# Patient Record
Sex: FEMALE | Race: WHITE | NOT HISPANIC OR LATINO | Employment: OTHER | RURAL
[De-identification: names, ages, dates, MRNs, and addresses within clinical notes are randomized per-mention and may not be internally consistent; named-entity substitution may affect disease eponyms.]

---

## 2017-01-06 ENCOUNTER — OFFICE VISIT (OUTPATIENT)
Dept: RETAIL CLINIC | Facility: CLINIC | Age: 52
End: 2017-01-06

## 2017-01-06 VITALS
OXYGEN SATURATION: 96 % | TEMPERATURE: 98.3 F | BODY MASS INDEX: 48.47 KG/M2 | WEIGHT: 265 LBS | HEART RATE: 86 BPM | RESPIRATION RATE: 20 BRPM

## 2017-01-06 DIAGNOSIS — R05.9 COUGH: Primary | ICD-10-CM

## 2017-01-06 PROCEDURE — 99213 OFFICE O/P EST LOW 20 MIN: CPT | Performed by: NURSE PRACTITIONER

## 2017-01-06 RX ORDER — PRAMIPEXOLE DIHYDROCHLORIDE 1.5 MG/1
1.5 TABLET ORAL 3 TIMES DAILY
Status: ON HOLD | COMMUNITY
End: 2017-07-17

## 2017-01-06 RX ORDER — OMEPRAZOLE 20 MG/1
20 CAPSULE, DELAYED RELEASE ORAL DAILY PRN
COMMUNITY
End: 2019-12-18 | Stop reason: ALTCHOICE

## 2017-01-06 RX ORDER — BUDESONIDE 0.5 MG/2ML
0.5 INHALANT ORAL
Qty: 30 ML | Refills: 0 | Status: ON HOLD | OUTPATIENT
Start: 2017-01-06 | End: 2017-07-17

## 2017-01-06 RX ORDER — LOSARTAN POTASSIUM 25 MG/1
25 TABLET ORAL DAILY
Status: ON HOLD | COMMUNITY
End: 2017-07-17

## 2017-01-06 RX ORDER — HYDROCHLOROTHIAZIDE 25 MG/1
25 TABLET ORAL DAILY
COMMUNITY
End: 2018-01-05 | Stop reason: SDUPTHER

## 2017-01-06 RX ORDER — PRAVASTATIN SODIUM 20 MG
20 TABLET ORAL DAILY
Status: ON HOLD | COMMUNITY
End: 2017-07-17

## 2017-01-06 RX ORDER — PREDNISONE 10 MG/1
TABLET ORAL
Qty: 21 TABLET | Refills: 0 | Status: ON HOLD | OUTPATIENT
Start: 2017-01-06 | End: 2017-07-17

## 2017-01-06 RX ORDER — PROPRANOLOL HYDROCHLORIDE 20 MG/1
20 TABLET ORAL 2 TIMES DAILY
COMMUNITY
End: 2017-07-20 | Stop reason: HOSPADM

## 2017-01-06 RX ORDER — FUROSEMIDE 20 MG/1
20 TABLET ORAL DAILY PRN
Status: ON HOLD | COMMUNITY
End: 2017-07-17

## 2017-01-06 RX ORDER — ASPIRIN 81 MG/1
81 TABLET ORAL DAILY
COMMUNITY
End: 2017-07-20 | Stop reason: HOSPADM

## 2017-01-06 RX ORDER — DEXTROMETHORPHAN HYDROBROMIDE AND PROMETHAZINE HYDROCHLORIDE 15; 6.25 MG/5ML; MG/5ML
5 SYRUP ORAL NIGHTLY PRN
Qty: 50 ML | Refills: 0 | Status: SHIPPED | OUTPATIENT
Start: 2017-01-06 | End: 2017-01-16

## 2017-01-06 NOTE — MR AVS SNAPSHOT
Ondina Hayes   1/6/2017 10:00 AM   Office Visit    Dept Phone:  [phone]   Encounter #:  [id number]    Provider:  KRISTOPHER DUBON   Department:  [facility]                Your Full Care Plan              Today's Medication Changes          These changes are accurate as of: 1/6/17 10:43 AM.  If you have any questions, ask your nurse or doctor.               New Medication(s)Ordered:     budesonide 0.5 MG/2ML nebulizer solution   Commonly known as:  PULMICORT   Take 2 mL by nebulization Daily.       predniSONE 10 MG tablet   Commonly known as:  DELTASONE   Take as directed per 6 day pred jolene       promethazine-dextromethorphan 6.25-15 MG/5ML syrup   Commonly known as:  PROMETHAZINE-DM   Take 5 mL by mouth At Night As Needed for cough for up to 10 days.            Where to Get Your Medications      These medications were sent to [facility] [place], [place] - [address] [phone]  - [phone] FX  [address] [facility] [place] [address]     Phone:  [phone]     budesonide 0.5 MG/2ML nebulizer solution    predniSONE 10 MG tablet    promethazine-dextromethorphan 6.25-15 MG/5ML syrup                  Your Updated Medication List          This list is accurate as of: 1/6/17 10:43 AM.  Always use your most recent med list.                aspirin 81 MG EC tablet       budesonide 0.5 MG/2ML nebulizer solution   Commonly known as:  PULMICORT   Take 2 mL by nebulization Daily.       COZAAR 25 MG tablet   Generic drug:  losartan       furosemide 20 MG tablet   Commonly known as:  LASIX       hydrochlorothiazide 25 MG tablet   Commonly known as:  HYDRODIURIL       MULTIVITAMIN ADULT PO       omeprazole 20 MG capsule   Commonly known as:  priLOSEC       pramipexole 1.5 MG tablet   Commonly known as:  MIRAPEX       pravastatin 20 MG tablet   Commonly known as:  PRAVACHOL       predniSONE 10 MG tablet   Commonly known as:   DELTASONE   Take as directed per 6 day pred jolene       promethazine-dextromethorphan 6.25-15 MG/5ML syrup   Commonly known as:  PROMETHAZINE-DM   Take 5 mL by mouth At Night As Needed for cough for up to 10 days.       propranolol 20 MG tablet   Commonly known as:  INDERAL       VITAMIN D (CHOLECALCIFEROL) PO               You Were Diagnosed With        Codes Comments    Cough    -  Primary ICD-10-CM: R05  ICD-9-CM: 786.2       Instructions    Cough, Adult  Coughing is a reflex that clears your throat and your airways. Coughing helps to heal and protect your lungs. It is normal to cough occasionally, but a cough that happens with other symptoms or lasts a long time may be a sign of a condition that needs treatment. A cough may last only 2-3 weeks (acute), or it may last longer than 8 weeks (chronic).  CAUSES  Coughing is commonly caused by:  · Breathing in substances that irritate your lungs.  · A viral or bacterial respiratory infection.  · Allergies.  · Asthma.  · Postnasal drip.  · Smoking.  · Acid backing up from the stomach into the esophagus (gastroesophageal reflux).  · Certain medicines.  · Chronic lung problems, including COPD (or rarely, lung cancer).  · Other medical conditions such as heart failure.  HOME CARE INSTRUCTIONS   Pay attention to any changes in your symptoms. Take these actions to help with your discomfort:  · Take medicines only as told by your health care provider.    If you were prescribed an antibiotic medicine, take it as told by your health care provider. Do not stop taking the antibiotic even if you start to feel better.    Talk with your health care provider before you take a cough suppressant medicine.  · Drink enough fluid to keep your urine clear or pale yellow.  · If the air is dry, use a cold steam vaporizer or humidifier in your bedroom or your home to help loosen secretions.  · Avoid anything that causes you to cough at work or at home.  · If your cough is worse at night, try  sleeping in a semi-upright position.  · Avoid cigarette smoke. If you smoke, quit smoking. If you need help quitting, ask your health care provider.  · Avoid caffeine.  · Avoid alcohol.  · Rest as needed.  SEEK MEDICAL CARE IF:   · You have new symptoms.  · You cough up pus.  · Your cough does not get better after 2-3 weeks, or your cough gets worse.  · You cannot control your cough with suppressant medicines and you are losing sleep.  · You develop pain that is getting worse or pain that is not controlled with pain medicines.  · You have a fever.  · You have unexplained weight loss.  · You have night sweats.  SEEK IMMEDIATE MEDICAL CARE IF:  · You cough up blood.  · You have difficulty breathing.  · Your heartbeat is very fast.     This information is not intended to replace advice given to you by your health care provider. Make sure you discuss any questions you have with your health care provider.     Document Released: 06/15/2012 Document Revised: 2016 Document Reviewed: 2016  [employer] Interactive Patient Education © [employer]       Patient Instructions History      Upcoming Appointments     Visit Type Date Time Department    OFFICE VISIT 2017 10:00 AM [facility]      Agent Partner Signup     [employer] allows you to send messages to your doctor, view your test results, renew your prescriptions, schedule appointments, and more. To sign up, go to [employer] and click on the Sign Up Now link in the New User? box. Enter your Agent Partner Activation Code exactly as it appears below along with the last four digits of your Social Security Number and your Date of Birth () to complete the sign-up process. If you do not sign up before the expiration date, you must request a new code.    Agent Partner Activation Code: [email or web address]  Expires: 2017 10:43 AM    If you have questions, you can email [email or web address] or call [phone] to talk to our Agent Partner  staff. Remember, MyChart is NOT to be used for urgent needs. For medical emergencies, dial 911.               Other Info from Your Visit           Allergies     Lorcet [Hydrocodone-acetaminophen]  Other (See Comments)    Rash that bleeds      Reason for Visit     URI           Vital Signs     Pulse Temperature Respirations Weight Oxygen Saturation       86 98.3 °F (36.8 °C) 20 265 lb (120 kg) 96%     Body Mass Index Smoking Status                48.47 kg/m2 Never Smoker          Problems and Diagnoses Noted     Cough    -  Primary

## 2017-01-06 NOTE — LETTER
January 6, 2017     Jazzmine Zapata PA-C  [address]  [address]  [place] [place] [address]    Patient: Ondina Hayes   YOB: 1965   Date of Visit: 1/6/2017       Dear Dr. Benny PA-C:    Thank you for referring Ondina Hayes to me for evaluation. Below are the relevant portions of my assessment and plan of care.    If you have questions, please do not hesitate to call me. I look forward to following Ondina along with you.         Sincerely,        PROVIDER [facility]        CC: No Recipients  Rayshawn Snell, APRN  1/6/2017 10:54 AM  Sign at close encounter  Subjective   Ondina Hayes is a 51 y.o. female.   Chief Complaint   Patient presents with   • Cough   • Wheezing      Cough   This is a recurrent problem. The current episode started more than 1 month ago. The problem has been waxing and waning. The problem occurs constantly. The cough is non-productive. Associated symptoms include wheezing. Pertinent negatives include no fever. She has tried a beta-agonist inhaler and prescription cough suppressant (zpak in october, clindamycin and doxycycline in november) for the symptoms. The treatment provided mild relief.   Wheezing    Associated symptoms include coughing. Pertinent negatives include no fever.        The following portions of the patient's history were reviewed and updated as appropriate: allergies, current medications, past family history, past medical history, past social history, past surgical history and problem list.    Review of Systems   Constitutional: Negative.  Negative for fever.   HENT: Negative.    Eyes: Negative.    Respiratory: Positive for cough and wheezing.    Gastrointestinal: Negative.    Genitourinary: Negative.    Skin: Negative.    Allergic/Immunologic: Negative.    Psychiatric/Behavioral: Negative.    All other systems reviewed and are negative.      Objective   Allergies   Allergen Reactions   • Lorcet [Hydrocodone-Acetaminophen] Other (See Comments)    Rash that bleeds         Physical Exam   Constitutional: She is oriented to person, place, and time. She appears well-developed and well-nourished.   HENT:   Right Ear: Tympanic membrane normal.   Left Ear: Tympanic membrane normal.   Nose: Nose normal.   Mouth/Throat: Oropharynx is clear and moist.   Eyes: Pupils are equal, round, and reactive to light.   Neck: Neck supple.   Cardiovascular: Normal rate and regular rhythm.    Pulmonary/Chest: Effort normal. She has decreased breath sounds in the right lower field and the left lower field. She has wheezes.   Persistent cough   Abdominal: Soft. Bowel sounds are normal.   Musculoskeletal: Normal range of motion.   Neurological: She is alert and oriented to person, place, and time.   Skin: Skin is warm and dry.   Psychiatric: She has a normal mood and affect.   Vitals reviewed.      Assessment/Plan   Ondina was seen today for cough and wheezing.    Diagnoses and all orders for this visit:    Cough  -     XR Chest PA & Lateral; Future    Other orders  -     predniSONE (DELTASONE) 10 MG tablet; Take as directed per 6 day pred jolene  -     promethazine-dextromethorphan (PROMETHAZINE-DM) 6.25-15 MG/5ML syrup; Take 5 mL by mouth At Night As Needed for cough for up to 10 days.  -     budesonide (PULMICORT) 0.5 MG/2ML nebulizer solution; Take 2 mL by nebulization Daily.  -     Respiratory Therapy Supplies (NEBULIZER/ADULT MASK) kit; 1 kit 3 (Three) Times a Day As Needed (for cough and wheeze). Indications: please forward to home supply store                 This document has been electronically signed by NHAN Downey January 6, 2017 10:51 AM

## 2017-01-06 NOTE — PROGRESS NOTES
Subjective   Ondina Hayes is a 51 y.o. female.   Chief Complaint   Patient presents with   • Cough   • Wheezing      Cough   This is a recurrent problem. The current episode started more than 1 month ago. The problem has been waxing and waning. The problem occurs constantly. The cough is non-productive. Associated symptoms include wheezing. Pertinent negatives include no fever. She has tried a beta-agonist inhaler and prescription cough suppressant (zpak in october, clindamycin and doxycycline in november) for the symptoms. The treatment provided mild relief.   Wheezing    Associated symptoms include coughing. Pertinent negatives include no fever.        The following portions of the patient's history were reviewed and updated as appropriate: allergies, current medications, past family history, past medical history, past social history, past surgical history and problem list.    Review of Systems   Constitutional: Negative.  Negative for fever.   HENT: Negative.    Eyes: Negative.    Respiratory: Positive for cough and wheezing.    Gastrointestinal: Negative.    Genitourinary: Negative.    Skin: Negative.    Allergic/Immunologic: Negative.    Psychiatric/Behavioral: Negative.    All other systems reviewed and are negative.      Objective   Allergies   Allergen Reactions   • Lorcet [Hydrocodone-Acetaminophen] Other (See Comments)     Rash that bleeds         Physical Exam   Constitutional: She is oriented to person, place, and time. She appears well-developed and well-nourished.   HENT:   Right Ear: Tympanic membrane normal.   Left Ear: Tympanic membrane normal.   Nose: Nose normal.   Mouth/Throat: Oropharynx is clear and moist.   Eyes: Pupils are equal, round, and reactive to light.   Neck: Neck supple.   Cardiovascular: Normal rate and regular rhythm.    Pulmonary/Chest: Effort normal. She has decreased breath sounds in the right lower field and the left lower field. She has wheezes.   Persistent cough    Abdominal: Soft. Bowel sounds are normal.   Musculoskeletal: Normal range of motion.   Neurological: She is alert and oriented to person, place, and time.   Skin: Skin is warm and dry.   Psychiatric: She has a normal mood and affect.   Vitals reviewed.      Assessment/Plan   Ondina was seen today for cough and wheezing.    Diagnoses and all orders for this visit:    Cough  -     XR Chest PA & Lateral; Future    Other orders  -     predniSONE (DELTASONE) 10 MG tablet; Take as directed per 6 day pred jolene  -     promethazine-dextromethorphan (PROMETHAZINE-DM) 6.25-15 MG/5ML syrup; Take 5 mL by mouth At Night As Needed for cough for up to 10 days.  -     budesonide (PULMICORT) 0.5 MG/2ML nebulizer solution; Take 2 mL by nebulization Daily.  -     Respiratory Therapy Supplies (NEBULIZER/ADULT MASK) kit; 1 kit 3 (Three) Times a Day As Needed (for cough and wheeze). Indications: please forward to home supply store                 This document has been electronically signed by NHAN Downey January 6, 2017 10:51 AM

## 2017-01-06 NOTE — PATIENT INSTRUCTIONS
Cough, Adult  Coughing is a reflex that clears your throat and your airways. Coughing helps to heal and protect your lungs. It is normal to cough occasionally, but a cough that happens with other symptoms or lasts a long time may be a sign of a condition that needs treatment. A cough may last only 2-3 weeks (acute), or it may last longer than 8 weeks (chronic).  CAUSES  Coughing is commonly caused by:  · Breathing in substances that irritate your lungs.  · A viral or bacterial respiratory infection.  · Allergies.  · Asthma.  · Postnasal drip.  · Smoking.  · Acid backing up from the stomach into the esophagus (gastroesophageal reflux).  · Certain medicines.  · Chronic lung problems, including COPD (or rarely, lung cancer).  · Other medical conditions such as heart failure.  HOME CARE INSTRUCTIONS   Pay attention to any changes in your symptoms. Take these actions to help with your discomfort:  · Take medicines only as told by your health care provider.    If you were prescribed an antibiotic medicine, take it as told by your health care provider. Do not stop taking the antibiotic even if you start to feel better.    Talk with your health care provider before you take a cough suppressant medicine.  · Drink enough fluid to keep your urine clear or pale yellow.  · If the air is dry, use a cold steam vaporizer or humidifier in your bedroom or your home to help loosen secretions.  · Avoid anything that causes you to cough at work or at home.  · If your cough is worse at night, try sleeping in a semi-upright position.  · Avoid cigarette smoke. If you smoke, quit smoking. If you need help quitting, ask your health care provider.  · Avoid caffeine.  · Avoid alcohol.  · Rest as needed.  SEEK MEDICAL CARE IF:   · You have new symptoms.  · You cough up pus.  · Your cough does not get better after 2-3 weeks, or your cough gets worse.  · You cannot control your cough with suppressant medicines and you are losing sleep.  · You  develop pain that is getting worse or pain that is not controlled with pain medicines.  · You have a fever.  · You have unexplained weight loss.  · You have night sweats.  SEEK IMMEDIATE MEDICAL CARE IF:  · You cough up blood.  · You have difficulty breathing.  · Your heartbeat is very fast.     This information is not intended to replace advice given to you by your health care provider. Make sure you discuss any questions you have with your health care provider.     Document Released: 06/15/2012 Document Revised: 09/07/2016 Document Reviewed: 02/24/2016  [employer] Interactive Patient Education ©2016 [employer]

## 2017-02-03 ENCOUNTER — APPOINTMENT (OUTPATIENT)
Dept: CT IMAGING | Facility: HOSPITAL | Age: 52
End: 2017-02-03

## 2017-02-03 ENCOUNTER — HOSPITAL ENCOUNTER (EMERGENCY)
Facility: HOSPITAL | Age: 52
Discharge: HOME | End: 2017-02-03
Attending: EMERGENCY MEDICINE | Admitting: EMERGENCY MEDICINE

## 2017-02-03 ENCOUNTER — APPOINTMENT (OUTPATIENT)
Dept: GENERAL RADIOLOGY | Facility: HOSPITAL | Age: 52
End: 2017-02-03

## 2017-02-03 VITALS
OXYGEN SATURATION: 99 % | TEMPERATURE: 98.2 F | BODY MASS INDEX: 47.84 KG/M2 | SYSTOLIC BLOOD PRESSURE: 129 MMHG | WEIGHT: 260 LBS | RESPIRATION RATE: 18 BRPM | HEIGHT: 62 IN | HEART RATE: 76 BPM | DIASTOLIC BLOOD PRESSURE: 74 MMHG

## 2017-02-03 DIAGNOSIS — I10 ESSENTIAL HYPERTENSION: ICD-10-CM

## 2017-02-03 DIAGNOSIS — G44.209 ACUTE NON INTRACTABLE TENSION-TYPE HEADACHE: Primary | ICD-10-CM

## 2017-02-03 LAB
ALBUMIN SERPL-MCNC: 4.5 G/DL (ref 3.5–5)
ALBUMIN/GLOB SERPL: 1.5 G/DL (ref 1.5–2.5)
ALP SERPL-CCNC: 71 U/L (ref 46–116)
ALT SERPL W P-5'-P-CCNC: 26 U/L (ref 10–36)
ANION GAP SERPL CALCULATED.3IONS-SCNC: 5.8 MMOL/L (ref 3.6–11.2)
AST SERPL-CCNC: 32 U/L (ref 10–30)
BACTERIA UR QL AUTO: ABNORMAL /HPF
BASOPHILS # BLD AUTO: 0.05 10*3/MM3 (ref 0–0.3)
BASOPHILS NFR BLD AUTO: 1.1 % (ref 0–2)
BILIRUB SERPL-MCNC: 0.3 MG/DL (ref 0.2–1.8)
BILIRUB UR QL STRIP: NEGATIVE
BUN BLD-MCNC: 14 MG/DL (ref 7–21)
BUN/CREAT SERPL: 18.9 (ref 7–25)
CALCIUM SPEC-SCNC: 10.1 MG/DL (ref 7.7–10)
CHLORIDE SERPL-SCNC: 105 MMOL/L (ref 99–112)
CLARITY UR: CLEAR
CO2 SERPL-SCNC: 31.2 MMOL/L (ref 24.3–31.9)
COLOR UR: YELLOW
CREAT BLD-MCNC: 0.74 MG/DL (ref 0.43–1.29)
DEPRECATED RDW RBC AUTO: 41.1 FL (ref 37–54)
EOSINOPHIL # BLD AUTO: 0.19 10*3/MM3 (ref 0–0.7)
EOSINOPHIL NFR BLD AUTO: 4.1 % (ref 0–5)
ERYTHROCYTE [DISTWIDTH] IN BLOOD BY AUTOMATED COUNT: 12.9 % (ref 11.5–14.5)
GFR SERPL CREATININE-BSD FRML MDRD: 83 ML/MIN/1.73
GLOBULIN UR ELPH-MCNC: 3.1 GM/DL
GLUCOSE BLD-MCNC: 91 MG/DL (ref 70–110)
GLUCOSE UR STRIP-MCNC: NEGATIVE MG/DL
HCT VFR BLD AUTO: 38.7 % (ref 37–47)
HGB BLD-MCNC: 13 G/DL (ref 12–16)
HGB UR QL STRIP.AUTO: NEGATIVE
HYALINE CASTS UR QL AUTO: ABNORMAL /LPF
IMM GRANULOCYTES # BLD: 0.02 10*3/MM3 (ref 0–0.03)
IMM GRANULOCYTES NFR BLD: 0.4 % (ref 0–0.5)
KETONES UR QL STRIP: NEGATIVE
LEUKOCYTE ESTERASE UR QL STRIP.AUTO: ABNORMAL
LYMPHOCYTES # BLD AUTO: 1.43 10*3/MM3 (ref 1–3)
LYMPHOCYTES NFR BLD AUTO: 30.8 % (ref 21–51)
MCH RBC QN AUTO: 29.2 PG (ref 27–33)
MCHC RBC AUTO-ENTMCNC: 33.6 G/DL (ref 33–37)
MCV RBC AUTO: 87 FL (ref 80–94)
MONOCYTES # BLD AUTO: 0.41 10*3/MM3 (ref 0.1–0.9)
MONOCYTES NFR BLD AUTO: 8.8 % (ref 0–10)
NEUTROPHILS # BLD AUTO: 2.55 10*3/MM3 (ref 1.4–6.5)
NEUTROPHILS NFR BLD AUTO: 54.8 % (ref 30–70)
NITRITE UR QL STRIP: NEGATIVE
OSMOLALITY SERPL CALC.SUM OF ELEC: 283.2 MOSM/KG (ref 273–305)
PH UR STRIP.AUTO: 5.5 [PH] (ref 5–8)
PLATELET # BLD AUTO: 317 10*3/MM3 (ref 130–400)
PMV BLD AUTO: 10 FL (ref 6–10)
POTASSIUM BLD-SCNC: 3.9 MMOL/L (ref 3.5–5.3)
PROT SERPL-MCNC: 7.6 G/DL (ref 6–8)
PROT UR QL STRIP: NEGATIVE
RBC # BLD AUTO: 4.45 10*6/MM3 (ref 4.2–5.4)
RBC # UR: ABNORMAL /HPF
REF LAB TEST METHOD: ABNORMAL
SODIUM BLD-SCNC: 142 MMOL/L (ref 135–153)
SP GR UR STRIP: 1.02 (ref 1–1.03)
SQUAMOUS #/AREA URNS HPF: ABNORMAL /HPF
TROPONIN I SERPL-MCNC: <0.006 NG/ML
UROBILINOGEN UR QL STRIP: ABNORMAL
WBC NRBC COR # BLD: 4.65 10*3/MM3 (ref 4.5–12.5)
WBC UR QL AUTO: ABNORMAL /HPF

## 2017-02-03 PROCEDURE — 85025 COMPLETE CBC W/AUTO DIFF WBC: CPT | Performed by: EMERGENCY MEDICINE

## 2017-02-03 PROCEDURE — 93005 ELECTROCARDIOGRAM TRACING: CPT | Performed by: EMERGENCY MEDICINE

## 2017-02-03 PROCEDURE — 93010 ELECTROCARDIOGRAM REPORT: CPT | Performed by: INTERNAL MEDICINE

## 2017-02-03 PROCEDURE — 70450 CT HEAD/BRAIN W/O DYE: CPT | Performed by: RADIOLOGY

## 2017-02-03 PROCEDURE — 81001 URINALYSIS AUTO W/SCOPE: CPT | Performed by: EMERGENCY MEDICINE

## 2017-02-03 PROCEDURE — 87086 URINE CULTURE/COLONY COUNT: CPT | Performed by: EMERGENCY MEDICINE

## 2017-02-03 PROCEDURE — 25010000002 KETOROLAC TROMETHAMINE PER 15 MG: Performed by: EMERGENCY MEDICINE

## 2017-02-03 PROCEDURE — 70450 CT HEAD/BRAIN W/O DYE: CPT

## 2017-02-03 PROCEDURE — 80053 COMPREHEN METABOLIC PANEL: CPT | Performed by: EMERGENCY MEDICINE

## 2017-02-03 PROCEDURE — 36415 COLL VENOUS BLD VENIPUNCTURE: CPT

## 2017-02-03 PROCEDURE — 94640 AIRWAY INHALATION TREATMENT: CPT

## 2017-02-03 PROCEDURE — 99284 EMERGENCY DEPT VISIT MOD MDM: CPT

## 2017-02-03 PROCEDURE — 84484 ASSAY OF TROPONIN QUANT: CPT | Performed by: EMERGENCY MEDICINE

## 2017-02-03 PROCEDURE — 96361 HYDRATE IV INFUSION ADD-ON: CPT

## 2017-02-03 PROCEDURE — 71010 HC CHEST PA OR AP: CPT

## 2017-02-03 PROCEDURE — 96374 THER/PROPH/DIAG INJ IV PUSH: CPT

## 2017-02-03 PROCEDURE — 71010 XR CHEST 1 VW: CPT | Performed by: RADIOLOGY

## 2017-02-03 RX ORDER — KETOROLAC TROMETHAMINE 30 MG/ML
30 INJECTION, SOLUTION INTRAMUSCULAR; INTRAVENOUS ONCE
Status: COMPLETED | OUTPATIENT
Start: 2017-02-03 | End: 2017-02-03

## 2017-02-03 RX ORDER — AMITRIPTYLINE HYDROCHLORIDE 50 MG/1
50 TABLET, FILM COATED ORAL NIGHTLY
Status: ON HOLD | COMMUNITY
End: 2017-07-17

## 2017-02-03 RX ORDER — MONTELUKAST SODIUM 10 MG/1
10 TABLET ORAL EVERY MORNING
COMMUNITY

## 2017-02-03 RX ORDER — SODIUM CHLORIDE 0.9 % (FLUSH) 0.9 %
10 SYRINGE (ML) INJECTION AS NEEDED
Status: DISCONTINUED | OUTPATIENT
Start: 2017-02-03 | End: 2017-02-03 | Stop reason: HOSPADM

## 2017-02-03 RX ORDER — BUTALBITAL, ACETAMINOPHEN AND CAFFEINE 50; 325; 40 MG/1; MG/1; MG/1
1-2 TABLET ORAL EVERY 6 HOURS PRN
Qty: 20 TABLET | Refills: 0 | Status: ON HOLD | OUTPATIENT
Start: 2017-02-03 | End: 2017-07-17

## 2017-02-03 RX ORDER — IPRATROPIUM BROMIDE AND ALBUTEROL SULFATE 2.5; .5 MG/3ML; MG/3ML
3 SOLUTION RESPIRATORY (INHALATION) ONCE
Status: COMPLETED | OUTPATIENT
Start: 2017-02-03 | End: 2017-02-03

## 2017-02-03 RX ADMIN — KETOROLAC TROMETHAMINE 30 MG: 30 INJECTION, SOLUTION INTRAMUSCULAR; INTRAVENOUS at 09:27

## 2017-02-03 RX ADMIN — SODIUM CHLORIDE 1000 ML: 9 INJECTION, SOLUTION INTRAVENOUS at 09:29

## 2017-02-03 RX ADMIN — IPRATROPIUM BROMIDE AND ALBUTEROL SULFATE 3 ML: .5; 3 SOLUTION RESPIRATORY (INHALATION) at 10:33

## 2017-02-03 NOTE — ED PROVIDER NOTES
Subjective   History of Present Illness  51-year-old white female complains of headache.  She noticed onset yesterday of headache which was diffuse and felt like pressure.  This seemed to start more in the neck and occipital region.  She notes that her blood pressure was elevated at this time with a systolic in the 150s.  She took an extra Cozaar last night.  This morning her blood pressure was more normal, but she continued to have headache.  She has associated photophobia and mild nausea.  She denies any visual changes, aura, speech changes, focal weakness, or other symptoms.  Review of Systems   All other systems reviewed and are negative.      Past Medical History   Diagnosis Date   • Acid reflux    • Allergic    • Asthma    • Hypertension        Allergies   Allergen Reactions   • Lorcet [Hydrocodone-Acetaminophen] Other (See Comments)     Rash that bleeds         Past Surgical History   Procedure Laterality Date   • Gastric sleeve laparoscopic       2 years ago   • Hysterectomy       Complete   •  section       x2   • Nose surgery     • Knee surgery       2 (1 was partial knee replacement)   • Foot surgery         Family History   Problem Relation Age of Onset   • No Known Problems Mother    • No Known Problems Father        Social History     Social History   • Marital status:      Spouse name: N/A   • Number of children: N/A   • Years of education: N/A     Social History Main Topics   • Smoking status: Never Smoker   • Smokeless tobacco: None   • Alcohol use No      Comment: !x yearly   • Drug use: No   • Sexual activity: Not Asked      Comment: Hysterectomy     Other Topics Concern   • None     Social History Narrative           Objective   Physical Exam   Constitutional: She is oriented to person, place, and time. She appears well-developed and well-nourished.   HENT:   Head: Normocephalic and atraumatic.   Cardiovascular: Normal rate, regular rhythm and normal heart sounds.  Exam reveals no  gallop and no friction rub.    No murmur heard.  Pulmonary/Chest: Effort normal and breath sounds normal. No respiratory distress. She has no wheezes. She has no rales.   Abdominal: Soft. Bowel sounds are normal. She exhibits no distension. There is no tenderness.   Musculoskeletal: Normal range of motion.   Neurological: She is alert and oriented to person, place, and time. No cranial nerve deficit.   Skin: Skin is warm and dry.   Psychiatric: She has a normal mood and affect.   Nursing note and vitals reviewed.      Procedures         ED Course  ED Course   Value Comment By Time   ECG 12 Lead Normal sinus rhythm.  Rate of 70.  Normal intervals.  No acute ischemic changes. Fredi Franklin MD 02/03 1027    Since initial history and exam, patient had episode of wheezing which resolved after respiratory treatment.  Workup was negative for here today.  Continues to have headache, but refuses any other pain medicine for her headache.  She used to have headaches and number of years ago but has not had them recently.  We'll discharge with prescription for Fioricet which she had used successfully in the past. Fredi Franklin MD 02/03 1049                  MDM  Number of Diagnoses or Management Options  Acute non intractable tension-type headache:   Essential hypertension:      Amount and/or Complexity of Data Reviewed  Clinical lab tests: ordered and reviewed  Tests in the radiology section of CPT®: reviewed and ordered  Independent visualization of images, tracings, or specimens: yes    Risk of Complications, Morbidity, and/or Mortality  Presenting problems: high  Diagnostic procedures: high  Management options: high        Final diagnoses:   Acute non intractable tension-type headache   Essential hypertension            Fredi Franklin MD  02/03/17 3505

## 2017-02-05 LAB — BACTERIA SPEC AEROBE CULT: NORMAL

## 2017-02-28 ENCOUNTER — TRANSCRIBE ORDERS (OUTPATIENT)
Dept: ADMINISTRATIVE | Facility: HOSPITAL | Age: 52
End: 2017-02-28

## 2017-02-28 DIAGNOSIS — H53.123: Primary | ICD-10-CM

## 2017-03-02 ENCOUNTER — APPOINTMENT (OUTPATIENT)
Dept: ULTRASOUND IMAGING | Facility: HOSPITAL | Age: 52
End: 2017-03-02

## 2017-03-14 ENCOUNTER — HOSPITAL ENCOUNTER (OUTPATIENT)
Dept: ULTRASOUND IMAGING | Facility: HOSPITAL | Age: 52
Discharge: HOME | End: 2017-03-14
Admitting: FAMILY MEDICINE

## 2017-03-14 DIAGNOSIS — H53.123: ICD-10-CM

## 2017-03-14 PROCEDURE — 93880 EXTRACRANIAL BILAT STUDY: CPT | Performed by: RADIOLOGY

## 2017-03-14 PROCEDURE — 93880 EXTRACRANIAL BILAT STUDY: CPT

## 2017-07-17 ENCOUNTER — HOSPITAL ENCOUNTER (INPATIENT)
Facility: HOSPITAL | Age: 52
LOS: 3 days | Discharge: HOME | End: 2017-07-20
Attending: EMERGENCY MEDICINE

## 2017-07-17 ENCOUNTER — APPOINTMENT (OUTPATIENT)
Dept: GENERAL RADIOLOGY | Facility: HOSPITAL | Age: 52
End: 2017-07-17

## 2017-07-17 ENCOUNTER — APPOINTMENT (OUTPATIENT)
Dept: ULTRASOUND IMAGING | Facility: HOSPITAL | Age: 52
End: 2017-07-17

## 2017-07-17 ENCOUNTER — APPOINTMENT (OUTPATIENT)
Dept: CARDIOLOGY | Facility: HOSPITAL | Age: 52
End: 2017-07-17

## 2017-07-17 ENCOUNTER — APPOINTMENT (OUTPATIENT)
Dept: CT IMAGING | Facility: HOSPITAL | Age: 52
End: 2017-07-17

## 2017-07-17 DIAGNOSIS — E05.91 THYROTOXICOSIS WITH THYROTOXIC CRISIS, UNSPECIFIED THYROTOXICOSIS TYPE: ICD-10-CM

## 2017-07-17 DIAGNOSIS — I48.91 RAPID ATRIAL FIBRILLATION (HCC): Primary | ICD-10-CM

## 2017-07-17 LAB
ALBUMIN SERPL-MCNC: 4.2 G/DL (ref 3.5–5)
ALBUMIN/GLOB SERPL: 1.4 G/DL (ref 1.5–2.5)
ALP SERPL-CCNC: 70 U/L (ref 35–104)
ALT SERPL W P-5'-P-CCNC: 30 U/L (ref 10–36)
ANION GAP SERPL CALCULATED.3IONS-SCNC: 4.9 MMOL/L (ref 3.6–11.2)
APTT PPP: 37.2 SECONDS (ref 23.8–36.1)
AST SERPL-CCNC: 27 U/L (ref 10–30)
BACTERIA UR QL AUTO: ABNORMAL /HPF
BASOPHILS # BLD AUTO: 0.03 10*3/MM3 (ref 0–0.3)
BASOPHILS # BLD AUTO: 0.03 10*3/MM3 (ref 0–0.3)
BASOPHILS NFR BLD AUTO: 0.7 % (ref 0–2)
BASOPHILS NFR BLD AUTO: 0.7 % (ref 0–2)
BILIRUB SERPL-MCNC: 0.5 MG/DL (ref 0.2–1.8)
BILIRUB UR QL STRIP: NEGATIVE
BUN BLD-MCNC: 15 MG/DL (ref 7–21)
BUN/CREAT SERPL: 27.8 (ref 7–25)
CALCIUM SPEC-SCNC: 10.1 MG/DL (ref 7.7–10)
CHLORIDE SERPL-SCNC: 108 MMOL/L (ref 99–112)
CK MB SERPL-CCNC: <0.18 NG/ML (ref 0–5)
CK MB SERPL-CCNC: <0.18 NG/ML (ref 0–5)
CK MB SERPL-RTO: NORMAL % (ref 0–3)
CK MB SERPL-RTO: NORMAL % (ref 0–3)
CK SERPL-CCNC: 29 U/L (ref 24–173)
CK SERPL-CCNC: 32 U/L (ref 24–173)
CLARITY UR: CLEAR
CO2 SERPL-SCNC: 28.1 MMOL/L (ref 24.3–31.9)
COLOR UR: YELLOW
CREAT BLD-MCNC: 0.54 MG/DL (ref 0.43–1.29)
CRP SERPL-MCNC: 0.66 MG/DL (ref 0–0.99)
D DIMER PPP FEU-MCNC: 0.35 MCGFEU/ML (ref 0–0.5)
DEPRECATED RDW RBC AUTO: 36.4 FL (ref 37–54)
DEPRECATED RDW RBC AUTO: 36.9 FL (ref 37–54)
EOSINOPHIL # BLD AUTO: 0.1 10*3/MM3 (ref 0–0.7)
EOSINOPHIL # BLD AUTO: 0.11 10*3/MM3 (ref 0–0.7)
EOSINOPHIL NFR BLD AUTO: 2.3 % (ref 0–5)
EOSINOPHIL NFR BLD AUTO: 2.5 % (ref 0–5)
ERYTHROCYTE [DISTWIDTH] IN BLOOD BY AUTOMATED COUNT: 12.1 % (ref 11.5–14.5)
ERYTHROCYTE [DISTWIDTH] IN BLOOD BY AUTOMATED COUNT: 12.1 % (ref 11.5–14.5)
GFR SERPL CREATININE-BSD FRML MDRD: 120 ML/MIN/1.73
GLOBULIN UR ELPH-MCNC: 3.1 GM/DL
GLUCOSE BLD-MCNC: 127 MG/DL (ref 70–110)
GLUCOSE UR STRIP-MCNC: NEGATIVE MG/DL
HBA1C MFR BLD: 5.7 % (ref 4.5–5.7)
HCT VFR BLD AUTO: 36 % (ref 37–47)
HCT VFR BLD AUTO: 37.5 % (ref 37–47)
HGB BLD-MCNC: 11.8 G/DL (ref 12–16)
HGB BLD-MCNC: 12.5 G/DL (ref 12–16)
HGB UR QL STRIP.AUTO: NEGATIVE
HYALINE CASTS UR QL AUTO: ABNORMAL /LPF
IMM GRANULOCYTES # BLD: 0.01 10*3/MM3 (ref 0–0.03)
IMM GRANULOCYTES # BLD: 0.01 10*3/MM3 (ref 0–0.03)
IMM GRANULOCYTES NFR BLD: 0.2 % (ref 0–0.5)
IMM GRANULOCYTES NFR BLD: 0.2 % (ref 0–0.5)
INR PPP: 0.99 (ref 0.9–1.1)
INR PPP: 1.03 (ref 0.9–1.1)
KETONES UR QL STRIP: NEGATIVE
LEUKOCYTE ESTERASE UR QL STRIP.AUTO: ABNORMAL
LYMPHOCYTES # BLD AUTO: 1.61 10*3/MM3 (ref 1–3)
LYMPHOCYTES # BLD AUTO: 1.86 10*3/MM3 (ref 1–3)
LYMPHOCYTES NFR BLD AUTO: 37.3 % (ref 21–51)
LYMPHOCYTES NFR BLD AUTO: 42.8 % (ref 21–51)
MAGNESIUM SERPL-MCNC: 1.7 MG/DL (ref 1.7–2.6)
MCH RBC QN AUTO: 28.4 PG (ref 27–33)
MCH RBC QN AUTO: 28.5 PG (ref 27–33)
MCHC RBC AUTO-ENTMCNC: 32.8 G/DL (ref 33–37)
MCHC RBC AUTO-ENTMCNC: 33.3 G/DL (ref 33–37)
MCV RBC AUTO: 85.4 FL (ref 80–94)
MCV RBC AUTO: 86.5 FL (ref 80–94)
MONOCYTES # BLD AUTO: 0.59 10*3/MM3 (ref 0.1–0.9)
MONOCYTES # BLD AUTO: 0.63 10*3/MM3 (ref 0.1–0.9)
MONOCYTES NFR BLD AUTO: 13.6 % (ref 0–10)
MONOCYTES NFR BLD AUTO: 14.6 % (ref 0–10)
MYOGLOBIN SERPL-MCNC: 28 NG/ML (ref 0–109)
MYOGLOBIN SERPL-MCNC: 31 NG/ML (ref 0–109)
NEUTROPHILS # BLD AUTO: 1.75 10*3/MM3 (ref 1.4–6.5)
NEUTROPHILS # BLD AUTO: 1.94 10*3/MM3 (ref 1.4–6.5)
NEUTROPHILS NFR BLD AUTO: 40.2 % (ref 30–70)
NEUTROPHILS NFR BLD AUTO: 44.9 % (ref 30–70)
NITRITE UR QL STRIP: NEGATIVE
OSMOLALITY SERPL CALC.SUM OF ELEC: 283.7 MOSM/KG (ref 273–305)
PH UR STRIP.AUTO: 7.5 [PH] (ref 5–8)
PLATELET # BLD AUTO: 299 10*3/MM3 (ref 130–400)
PLATELET # BLD AUTO: 326 10*3/MM3 (ref 130–400)
PMV BLD AUTO: 10 FL (ref 6–10)
PMV BLD AUTO: 9.7 FL (ref 6–10)
POTASSIUM BLD-SCNC: 3.6 MMOL/L (ref 3.5–5.3)
PROT SERPL-MCNC: 7.3 G/DL (ref 6–8)
PROT UR QL STRIP: NEGATIVE
PROTHROMBIN TIME: 13.2 SECONDS (ref 11–15.4)
PROTHROMBIN TIME: 13.6 SECONDS (ref 11–15.4)
RBC # BLD AUTO: 4.16 10*6/MM3 (ref 4.2–5.4)
RBC # BLD AUTO: 4.39 10*6/MM3 (ref 4.2–5.4)
RBC # UR: ABNORMAL /HPF
REF LAB TEST METHOD: ABNORMAL
SODIUM BLD-SCNC: 141 MMOL/L (ref 135–153)
SP GR UR STRIP: 1.01 (ref 1–1.03)
SQUAMOUS #/AREA URNS HPF: ABNORMAL /HPF
T4 FREE SERPL-MCNC: 3.39 NG/DL (ref 0.89–1.76)
TROPONIN I SERPL-MCNC: <0.006 NG/ML
TROPONIN I SERPL-MCNC: <0.006 NG/ML
TSH SERPL DL<=0.05 MIU/L-ACNC: <0.01 MIU/ML (ref 0.55–4.78)
UROBILINOGEN UR QL STRIP: ABNORMAL
WBC NRBC COR # BLD: 4.32 10*3/MM3 (ref 4.5–12.5)
WBC NRBC COR # BLD: 4.35 10*3/MM3 (ref 4.5–12.5)
WBC UR QL AUTO: ABNORMAL /HPF

## 2017-07-17 PROCEDURE — 94799 UNLISTED PULMONARY SVC/PX: CPT

## 2017-07-17 PROCEDURE — 25010000002 ENOXAPARIN PER 10 MG: Performed by: INTERNAL MEDICINE

## 2017-07-17 PROCEDURE — 71010 XR CHEST 1 VW: CPT | Performed by: RADIOLOGY

## 2017-07-17 PROCEDURE — 80053 COMPREHEN METABOLIC PANEL: CPT | Performed by: NURSE PRACTITIONER

## 2017-07-17 PROCEDURE — 84443 ASSAY THYROID STIM HORMONE: CPT | Performed by: NURSE PRACTITIONER

## 2017-07-17 PROCEDURE — 87086 URINE CULTURE/COLONY COUNT: CPT | Performed by: NURSE PRACTITIONER

## 2017-07-17 PROCEDURE — 76536 US EXAM OF HEAD AND NECK: CPT | Performed by: RADIOLOGY

## 2017-07-17 PROCEDURE — 81001 URINALYSIS AUTO W/SCOPE: CPT | Performed by: NURSE PRACTITIONER

## 2017-07-17 PROCEDURE — 93306 TTE W/DOPPLER COMPLETE: CPT

## 2017-07-17 PROCEDURE — 86140 C-REACTIVE PROTEIN: CPT | Performed by: INTERNAL MEDICINE

## 2017-07-17 PROCEDURE — 84439 ASSAY OF FREE THYROXINE: CPT | Performed by: NURSE PRACTITIONER

## 2017-07-17 PROCEDURE — 93005 ELECTROCARDIOGRAM TRACING: CPT | Performed by: NURSE PRACTITIONER

## 2017-07-17 PROCEDURE — 82550 ASSAY OF CK (CPK): CPT | Performed by: PHYSICIAN ASSISTANT

## 2017-07-17 PROCEDURE — 25010000002 ONDANSETRON PER 1 MG: Performed by: INTERNAL MEDICINE

## 2017-07-17 PROCEDURE — 84484 ASSAY OF TROPONIN QUANT: CPT | Performed by: PHYSICIAN ASSISTANT

## 2017-07-17 PROCEDURE — 99285 EMERGENCY DEPT VISIT HI MDM: CPT

## 2017-07-17 PROCEDURE — 85610 PROTHROMBIN TIME: CPT | Performed by: NURSE PRACTITIONER

## 2017-07-17 PROCEDURE — 76536 US EXAM OF HEAD AND NECK: CPT

## 2017-07-17 PROCEDURE — 83036 HEMOGLOBIN GLYCOSYLATED A1C: CPT | Performed by: PHYSICIAN ASSISTANT

## 2017-07-17 PROCEDURE — 85730 THROMBOPLASTIN TIME PARTIAL: CPT | Performed by: PHYSICIAN ASSISTANT

## 2017-07-17 PROCEDURE — 85025 COMPLETE CBC W/AUTO DIFF WBC: CPT | Performed by: NURSE PRACTITIONER

## 2017-07-17 PROCEDURE — 83735 ASSAY OF MAGNESIUM: CPT | Performed by: INTERNAL MEDICINE

## 2017-07-17 PROCEDURE — 83874 ASSAY OF MYOGLOBIN: CPT | Performed by: PHYSICIAN ASSISTANT

## 2017-07-17 PROCEDURE — 70450 CT HEAD/BRAIN W/O DYE: CPT | Performed by: RADIOLOGY

## 2017-07-17 PROCEDURE — 71010 HC CHEST PA OR AP: CPT

## 2017-07-17 PROCEDURE — 99223 1ST HOSP IP/OBS HIGH 75: CPT | Performed by: INTERNAL MEDICINE

## 2017-07-17 PROCEDURE — 85610 PROTHROMBIN TIME: CPT | Performed by: PHYSICIAN ASSISTANT

## 2017-07-17 PROCEDURE — 85025 COMPLETE CBC W/AUTO DIFF WBC: CPT | Performed by: PHYSICIAN ASSISTANT

## 2017-07-17 PROCEDURE — 93306 TTE W/DOPPLER COMPLETE: CPT | Performed by: INTERNAL MEDICINE

## 2017-07-17 PROCEDURE — 85379 FIBRIN DEGRADATION QUANT: CPT | Performed by: NURSE PRACTITIONER

## 2017-07-17 PROCEDURE — 93005 ELECTROCARDIOGRAM TRACING: CPT | Performed by: INTERNAL MEDICINE

## 2017-07-17 PROCEDURE — 82553 CREATINE MB FRACTION: CPT | Performed by: PHYSICIAN ASSISTANT

## 2017-07-17 PROCEDURE — 70450 CT HEAD/BRAIN W/O DYE: CPT

## 2017-07-17 RX ORDER — ASPIRIN 81 MG/1
81 TABLET ORAL DAILY
Status: DISCONTINUED | OUTPATIENT
Start: 2017-07-17 | End: 2017-07-20

## 2017-07-17 RX ORDER — ALBUTEROL SULFATE 2.5 MG/3ML
2.5 SOLUTION RESPIRATORY (INHALATION) AS NEEDED
Status: CANCELLED | OUTPATIENT
Start: 2017-07-17

## 2017-07-17 RX ORDER — ACETAMINOPHEN 325 MG/1
650 TABLET ORAL EVERY 6 HOURS PRN
Status: DISCONTINUED | OUTPATIENT
Start: 2017-07-17 | End: 2017-07-20 | Stop reason: HOSPADM

## 2017-07-17 RX ORDER — RANITIDINE 150 MG/1
150 CAPSULE ORAL
COMMUNITY
End: 2019-12-18 | Stop reason: ALTCHOICE

## 2017-07-17 RX ORDER — HEPARIN SODIUM 5000 [USP'U]/ML
44.2 INJECTION, SOLUTION INTRAVENOUS; SUBCUTANEOUS ONCE
Status: DISCONTINUED | OUTPATIENT
Start: 2017-07-17 | End: 2017-07-17

## 2017-07-17 RX ORDER — ACETAMINOPHEN AND CODEINE PHOSPHATE 300; 30 MG/1; MG/1
1 TABLET ORAL ONCE
Status: COMPLETED | OUTPATIENT
Start: 2017-07-17 | End: 2017-07-17

## 2017-07-17 RX ORDER — PRAMIPEXOLE DIHYDROCHLORIDE 0.12 MG/1
0.5 TABLET ORAL EVERY MORNING
Status: DISCONTINUED | OUTPATIENT
Start: 2017-07-18 | End: 2017-07-20 | Stop reason: HOSPADM

## 2017-07-17 RX ORDER — AMITRIPTYLINE HYDROCHLORIDE 50 MG/1
100 TABLET, FILM COATED ORAL EVERY EVENING
Status: DISCONTINUED | OUTPATIENT
Start: 2017-07-17 | End: 2017-07-20 | Stop reason: HOSPADM

## 2017-07-17 RX ORDER — AMITRIPTYLINE HYDROCHLORIDE 100 MG/1
100 TABLET, FILM COATED ORAL EVERY EVENING
COMMUNITY

## 2017-07-17 RX ORDER — PROPRANOLOL HYDROCHLORIDE 20 MG/1
20 TABLET ORAL EVERY 12 HOURS SCHEDULED
Status: DISCONTINUED | OUTPATIENT
Start: 2017-07-17 | End: 2017-07-18

## 2017-07-17 RX ORDER — METHIMAZOLE 10 MG/1
10 TABLET ORAL DAILY
Status: DISCONTINUED | OUTPATIENT
Start: 2017-07-18 | End: 2017-07-17

## 2017-07-17 RX ORDER — POTASSIUM CHLORIDE 20 MEQ/1
40 TABLET, EXTENDED RELEASE ORAL ONCE
Status: COMPLETED | OUTPATIENT
Start: 2017-07-17 | End: 2017-07-17

## 2017-07-17 RX ORDER — CYCLOBENZAPRINE HCL 10 MG
5 TABLET ORAL 3 TIMES DAILY PRN
Status: DISCONTINUED | OUTPATIENT
Start: 2017-07-17 | End: 2017-07-20 | Stop reason: HOSPADM

## 2017-07-17 RX ORDER — ALBUTEROL SULFATE 90 UG/1
2 AEROSOL, METERED RESPIRATORY (INHALATION) AS NEEDED
COMMUNITY

## 2017-07-17 RX ORDER — PRAMIPEXOLE DIHYDROCHLORIDE 0.5 MG/1
1 TABLET ORAL NIGHTLY
COMMUNITY
End: 2017-11-13 | Stop reason: DRUGHIGH

## 2017-07-17 RX ORDER — ATORVASTATIN CALCIUM 10 MG/1
10 TABLET, FILM COATED ORAL NIGHTLY
Status: DISCONTINUED | OUTPATIENT
Start: 2017-07-17 | End: 2017-07-20 | Stop reason: HOSPADM

## 2017-07-17 RX ORDER — PHENOBARBITAL, HYOSCYAMINE SULFATE, ATROPINE SULFATE AND SCOPOLAMINE HYDROBROMIDE .0194; .1037; 16.2; .0065 MG/1; MG/1; MG/1; MG/1
2 TABLET ORAL ONCE
Status: COMPLETED | OUTPATIENT
Start: 2017-07-17 | End: 2017-07-17

## 2017-07-17 RX ORDER — LOSARTAN POTASSIUM 50 MG/1
50 TABLET ORAL DAILY
COMMUNITY
End: 2017-07-20 | Stop reason: HOSPADM

## 2017-07-17 RX ORDER — PANTOPRAZOLE SODIUM 40 MG/1
40 TABLET, DELAYED RELEASE ORAL
Status: DISCONTINUED | OUTPATIENT
Start: 2017-07-18 | End: 2017-07-20 | Stop reason: HOSPADM

## 2017-07-17 RX ORDER — PRAVASTATIN SODIUM 10 MG
10 TABLET ORAL NIGHTLY
COMMUNITY

## 2017-07-17 RX ORDER — PROPRANOLOL HYDROCHLORIDE 10 MG/1
20 TABLET ORAL ONCE
Status: COMPLETED | OUTPATIENT
Start: 2017-07-17 | End: 2017-07-17

## 2017-07-17 RX ORDER — METHIMAZOLE 10 MG/1
10 TABLET ORAL ONCE
Status: COMPLETED | OUTPATIENT
Start: 2017-07-17 | End: 2017-07-17

## 2017-07-17 RX ORDER — NITROGLYCERIN 0.4 MG/1
0.4 TABLET SUBLINGUAL
Status: DISCONTINUED | OUTPATIENT
Start: 2017-07-17 | End: 2017-07-20 | Stop reason: HOSPADM

## 2017-07-17 RX ORDER — CYCLOBENZAPRINE HCL 5 MG
5 TABLET ORAL 3 TIMES DAILY PRN
COMMUNITY
End: 2018-09-24 | Stop reason: ALTCHOICE

## 2017-07-17 RX ORDER — MULTIVITAMIN
1 TABLET ORAL DAILY
Status: DISCONTINUED | OUTPATIENT
Start: 2017-07-17 | End: 2017-07-20 | Stop reason: HOSPADM

## 2017-07-17 RX ORDER — HEPARIN SODIUM 5000 [USP'U]/ML
22.1 INJECTION, SOLUTION INTRAVENOUS; SUBCUTANEOUS AS NEEDED
Status: DISCONTINUED | OUTPATIENT
Start: 2017-07-17 | End: 2017-07-17

## 2017-07-17 RX ORDER — SODIUM CHLORIDE 0.9 % (FLUSH) 0.9 %
10 SYRINGE (ML) INJECTION AS NEEDED
Status: DISCONTINUED | OUTPATIENT
Start: 2017-07-17 | End: 2017-07-20 | Stop reason: HOSPADM

## 2017-07-17 RX ORDER — PRAMIPEXOLE DIHYDROCHLORIDE 1 MG/1
1 TABLET ORAL NIGHTLY
Status: DISCONTINUED | OUTPATIENT
Start: 2017-07-17 | End: 2017-07-20 | Stop reason: HOSPADM

## 2017-07-17 RX ORDER — HEPARIN SODIUM 5000 [USP'U]/ML
44.2 INJECTION, SOLUTION INTRAVENOUS; SUBCUTANEOUS AS NEEDED
Status: DISCONTINUED | OUTPATIENT
Start: 2017-07-17 | End: 2017-07-17

## 2017-07-17 RX ORDER — ALUMINA, MAGNESIA, AND SIMETHICONE 2400; 2400; 240 MG/30ML; MG/30ML; MG/30ML
15 SUSPENSION ORAL ONCE
Status: COMPLETED | OUTPATIENT
Start: 2017-07-17 | End: 2017-07-17

## 2017-07-17 RX ORDER — LOSARTAN POTASSIUM 50 MG/1
50 TABLET ORAL DAILY
Status: CANCELLED | OUTPATIENT
Start: 2017-07-17

## 2017-07-17 RX ORDER — ONDANSETRON 2 MG/ML
4 INJECTION INTRAMUSCULAR; INTRAVENOUS EVERY 6 HOURS PRN
Status: DISCONTINUED | OUTPATIENT
Start: 2017-07-17 | End: 2017-07-18

## 2017-07-17 RX ORDER — METHIMAZOLE 10 MG/1
10 TABLET ORAL EVERY 8 HOURS SCHEDULED
Status: DISCONTINUED | OUTPATIENT
Start: 2017-07-17 | End: 2017-07-20 | Stop reason: HOSPADM

## 2017-07-17 RX ORDER — POTASSIUM CHLORIDE 750 MG/1
10 CAPSULE, EXTENDED RELEASE ORAL DAILY PRN
COMMUNITY
End: 2018-12-28 | Stop reason: SDUPTHER

## 2017-07-17 RX ORDER — SODIUM CHLORIDE 9 MG/ML
50 INJECTION, SOLUTION INTRAVENOUS CONTINUOUS
Status: DISCONTINUED | OUTPATIENT
Start: 2017-07-17 | End: 2017-07-19

## 2017-07-17 RX ORDER — PRAMIPEXOLE DIHYDROCHLORIDE 1 MG/1
1.5 TABLET ORAL NIGHTLY
COMMUNITY
End: 2022-05-17 | Stop reason: ALTCHOICE

## 2017-07-17 RX ORDER — FAMOTIDINE 20 MG/1
40 TABLET, FILM COATED ORAL NIGHTLY
Status: CANCELLED | OUTPATIENT
Start: 2017-07-17

## 2017-07-17 RX ORDER — MULTIVITAMIN
1 TABLET ORAL DAILY
COMMUNITY
End: 2020-06-10 | Stop reason: ALTCHOICE

## 2017-07-17 RX ORDER — SODIUM CHLORIDE 0.9 % (FLUSH) 0.9 %
1-10 SYRINGE (ML) INJECTION AS NEEDED
Status: DISCONTINUED | OUTPATIENT
Start: 2017-07-17 | End: 2017-07-20 | Stop reason: HOSPADM

## 2017-07-17 RX ORDER — POTASSIUM CHLORIDE 750 MG/1
10 CAPSULE, EXTENDED RELEASE ORAL DAILY PRN
Status: DISCONTINUED | OUTPATIENT
Start: 2017-07-17 | End: 2017-07-20 | Stop reason: HOSPADM

## 2017-07-17 RX ORDER — MONTELUKAST SODIUM 10 MG/1
10 TABLET ORAL EVERY MORNING
Status: DISCONTINUED | OUTPATIENT
Start: 2017-07-18 | End: 2017-07-20 | Stop reason: HOSPADM

## 2017-07-17 RX ADMIN — PRAMIPEXOLE DIHYDROCHLORIDE 1 MG: 1 TABLET ORAL at 20:20

## 2017-07-17 RX ADMIN — AMITRIPTYLINE HYDROCHLORIDE 100 MG: 50 TABLET, FILM COATED ORAL at 20:20

## 2017-07-17 RX ADMIN — SODIUM CHLORIDE 500 ML: 9 INJECTION, SOLUTION INTRAVENOUS at 07:37

## 2017-07-17 RX ADMIN — METHIMAZOLE 10 MG: 10 TABLET ORAL at 10:08

## 2017-07-17 RX ADMIN — ATORVASTATIN CALCIUM 10 MG: 10 TABLET, FILM COATED ORAL at 20:20

## 2017-07-17 RX ADMIN — LIDOCAINE HYDROCHLORIDE 10 ML: 20 SOLUTION ORAL; TOPICAL at 20:19

## 2017-07-17 RX ADMIN — DILTIAZEM HYDROCHLORIDE 5 MG/HR: 100 INJECTION, POWDER, LYOPHILIZED, FOR SOLUTION INTRAVENOUS at 07:35

## 2017-07-17 RX ADMIN — ACETAMINOPHEN 650 MG: 325 TABLET ORAL at 23:56

## 2017-07-17 RX ADMIN — ONDANSETRON 4 MG: 2 INJECTION, SOLUTION INTRAMUSCULAR; INTRAVENOUS at 19:04

## 2017-07-17 RX ADMIN — POTASSIUM CHLORIDE 40 MEQ: 1500 TABLET, EXTENDED RELEASE ORAL at 20:20

## 2017-07-17 RX ADMIN — PHENOBARBITAL, HYOSCYAMINE SULFATE, ATROPINE SULFATE, SCOPOLAMINE HYDROBROMIDE 32.4 MG: 16.2; .1037; .0194; .0065 TABLET ORAL at 20:20

## 2017-07-17 RX ADMIN — METHIMAZOLE 10 MG: 10 TABLET ORAL at 20:23

## 2017-07-17 RX ADMIN — ACETAMINOPHEN AND CODEINE PHOSPHATE 1 TABLET: 300; 30 TABLET ORAL at 10:26

## 2017-07-17 RX ADMIN — PROPRANOLOL HYDROCHLORIDE 20 MG: 20 TABLET ORAL at 20:19

## 2017-07-17 RX ADMIN — ENOXAPARIN SODIUM 110 MG: 60 INJECTION SUBCUTANEOUS at 11:10

## 2017-07-17 RX ADMIN — ASPIRIN 81 MG: 81 TABLET ORAL at 20:20

## 2017-07-17 RX ADMIN — Medication 1 TABLET: at 20:20

## 2017-07-17 RX ADMIN — SODIUM CHLORIDE 50 ML/HR: 9 INJECTION, SOLUTION INTRAVENOUS at 19:05

## 2017-07-17 RX ADMIN — ALUMINUM HYDROXIDE, MAGNESIUM HYDROXIDE, AND DIMETHICONE 15 ML: 400; 400; 40 SUSPENSION ORAL at 20:18

## 2017-07-17 RX ADMIN — PROPRANOLOL HYDROCHLORIDE 20 MG: 10 TABLET ORAL at 10:26

## 2017-07-17 NOTE — ED PROVIDER NOTES
Subjective   Patient is a 51 y.o. female presenting with palpitations.   History provided by:  Patient   used: No    Palpitations   Palpitations quality:  Irregular  Onset quality:  Gradual  Duration:  1 day  Timing:  Intermittent  Progression:  Waxing and waning  Chronicity:  Recurrent  Context: not anxiety, not appetite suppressants, not caffeine, not dehydration, not illicit drugs, not nicotine and not stimulant use    Relieved by:  Nothing  Worsened by:  Nothing  Ineffective treatments:  Beta blockers and bed rest  Associated symptoms: leg pain, malaise/fatigue, nausea and shortness of breath    Associated symptoms: no back pain, no chest pain, no chest pressure, no dizziness, no hemoptysis, no lower extremity edema and no vomiting    Risk factors: hx of atrial fibrillation and hx of thyroid disease    Risk factors: no hx of DVT and no hx of PE        Review of Systems   Constitutional: Positive for activity change, fatigue and malaise/fatigue.   HENT: Negative.    Eyes: Negative.    Respiratory: Positive for shortness of breath. Negative for hemoptysis.    Cardiovascular: Positive for palpitations. Negative for chest pain.   Gastrointestinal: Positive for nausea. Negative for vomiting.   Endocrine: Negative.    Genitourinary: Negative.    Musculoskeletal: Negative.  Negative for back pain.   Skin: Negative.    Allergic/Immunologic: Negative.    Neurological: Negative.  Negative for dizziness.   Hematological: Negative.    Psychiatric/Behavioral: Negative.    All other systems reviewed and are negative.      Past Medical History:   Diagnosis Date   • Acid reflux    • Allergic    • Asthma    • Hypertension        Allergies   Allergen Reactions   • Lorcet [Hydrocodone-Acetaminophen] Other (See Comments)     Rash that bleeds         Past Surgical History:   Procedure Laterality Date   •  SECTION      x2   • FOOT SURGERY     • GASTRIC SLEEVE LAPAROSCOPIC      2 years ago   • HYSTERECTOMY       Complete   • KNEE SURGERY      2 (1 was partial knee replacement)   • NOSE SURGERY         Family History   Problem Relation Age of Onset   • No Known Problems Mother    • No Known Problems Father        Social History     Social History   • Marital status:      Spouse name: N/A   • Number of children: N/A   • Years of education: N/A     Social History Main Topics   • Smoking status: Never Smoker   • Smokeless tobacco: None   • Alcohol use No      Comment: !x yearly   • Drug use: No   • Sexual activity: Not Asked      Comment: Hysterectomy     Other Topics Concern   • None     Social History Narrative           Objective   Physical Exam   Constitutional: She is oriented to person, place, and time. She appears well-developed and well-nourished.   HENT:   Head: Normocephalic and atraumatic.   Nose: Nose normal.   Mouth/Throat: Oropharynx is clear and moist.   Eyes: EOM are normal. Pupils are equal, round, and reactive to light.   Neck: Normal range of motion.   Cardiovascular: Intact distal pulses.    Irregularly irregular tachycardic rhythm   Pulmonary/Chest: Effort normal and breath sounds normal.   Abdominal: Soft. Bowel sounds are normal.   Musculoskeletal: Normal range of motion.   Neurological: She is alert and oriented to person, place, and time.   Skin: Skin is warm and dry.   Psychiatric: She has a normal mood and affect. Her behavior is normal. Judgment and thought content normal.   Nursing note and vitals reviewed.      Procedures         ED Course  ED Course   Value Comment By Time   ECG 12 Lead Atrial fibrillation with rapid ventricular response. Rate 149. Nonspecific ST-T wave change. No evidence for acute ischemia. Vera Perry, NHAN 07/17 0723    Care transferred BEVERLY Perry, APRN 07/17 0756                  [facility]    Final diagnoses:   None            Vera Perry, NHAN  07/17/17 0757

## 2017-07-17 NOTE — H&P
[facility] HOSPITALIST HISTORY AND PHYSICAL    Patient Identification:  Name:  Ondina Hayes  Age:  51 y.o.  Sex:  female  :  1965  MRN:  [id number]   Visit Number:  [id number]  Primary Care Physician:  Jazzmine Zapata PA-C     Chief complaint:   Chief Complaint   Patient presents with   • Rapid Heart Rate     History of presenting illness:   Patient is a 51 y.o. female with past medical history significant for asthma, essential hypertension, hyperlipidemia, GERD, and morbid obesity that presented to the [facility] emergency department for evaluation of palpitations. Patient states that for the past week she has been experiencing intermittent palpitations. She reports that her baseline heart rate has also remained high during this time. She states that the highest she noted her heart rate to be was 160 bpm. Patient states that her palpitations has increased over the past two days. She also reports increased blood pressure over the past week. Patient states that she also has had non-radiating chest pain and pressure located on the left anterior chest. She describes the pain as sharp, waxing and waning. She denies history of myocardial infarction. Patient also reports low grade fever, diaphoresis, and heat intolerance since time of symptom onset. She reports 12 pound unintentional weight loss in the past week. She denies any shortness of breath. She denies any abdominal pain, nausea, vomiting, or diarrhea. She denies any urinary symptoms.     Patient states that she does have a remote history of atrial fibrillation. She states that during pre-op work up for gastric sleeve two years ago she was noted to have afib. She was seen by cardiologist Dr. Jesus in [place], [place]. She states that she is unsure of the details, but knows that it was short lived and she was not put on any anticoagulants. She is prescribed Propanolol by her cardiologist for tachycardia and palpitations.      Patient also reports close follow up with endocrinologist in [place], [place]. She states that she has a multinodular goiter. She states she has underwent multiple thyroid nodule biopsies, with no atypical findings. She states that typically she runs borderline hypothyroid, but never to the point that the endocrinologist wants to start her on medication. She states that she was recently evaluated by endocrinologist in April 2017 with labs, and states that her thyroid labs at that time were WNL. She reports family history of hypo/hyperthyroidism, her sister has had thyroidectomy.     My evaluation, the patient is complaining of severe nausea and pain in her chest.  She complains of belching.  She also complains of intermittent fever.  She denies cough.  She denies dysuria.  She does complain of some urinary frequency.    Upon arrival to the ED, vitals were temperature 98, , RR 20, /83, and oxygen saturation of 100% on room air. CMP with glucose 127, BUN/Cr ratio of 27.8, and calcium 10.1. CBC with WBC 4.32. TSH <0.010 with Free T4 elevated at 3.39. D Dimer negative. Urinalysis with small leukocyte esterase and trace bacteria noted. CXR with no evidence of consolidations or effusions. EKG significant for atrial fibrillation with RVR. While in the ED, patient was started on Cardizem drip after a bolus was given. Patient was also given a 10 mg dose of methimazole and 20 mg propanolol.     Patient has been admitted to the telemetry floor for further evaluation and treatment.     Present during visit: BRITTNEY Slater  ---------------------------------------------------------------------------------------------------------------------   Review of Systems   Constitutional: Positive for diaphoresis, fever and unexpected weight change. Negative for chills and fatigue.        12 pound weight loss in one week unintentionally    HENT: Negative for congestion, nosebleeds, postnasal drip, rhinorrhea, sinus pressure,  sneezing and sore throat.    Eyes: Negative for discharge and visual disturbance.   Respiratory: Positive for chest tightness. Negative for shortness of breath and wheezing.    Cardiovascular: Positive for chest pain and palpitations. Negative for leg swelling.   Gastrointestinal: Negative for abdominal pain, constipation, diarrhea, nausea and vomiting.   Endocrine: Positive for heat intolerance. Negative for polydipsia, polyphagia and polyuria.   Genitourinary: Negative for dysuria, flank pain, frequency, hematuria and urgency.   Musculoskeletal: Negative for arthralgias, back pain, gait problem and myalgias.   Skin: Negative for rash and wound.   Allergic/Immunologic: Negative for environmental allergies, food allergies and immunocompromised state.   Neurological: Negative for dizziness, seizures, syncope, weakness, light-headedness and headaches.   Hematological: Negative for adenopathy. Does not bruise/bleed easily.   Psychiatric/Behavioral: Negative for confusion and decreased concentration. The patient is not nervous/anxious.       ---------------------------------------------------------------------------------------------------------------------   Past Medical History:   Diagnosis Date   • Acid reflux    • Allergic    • Asthma    • Atrial fibrillation    • Hyperlipidemia    • Hypertension    • Hyperthyroidism    • Morbid obesity    • Vitamin D deficiency      Past Surgical History:   Procedure Laterality Date   •  SECTION      x2   • FOOT SURGERY     • GASTRIC SLEEVE LAPAROSCOPIC      2 years ago   • HYSTERECTOMY      Complete   • KNEE SURGERY      2 (1 was partial knee replacement)   • NOSE SURGERY       Family History   Problem Relation Age of Onset   • No Known Problems Mother    • No Known Problems Father      Social History     Social History   • Marital status:      Spouse name: N/A   • Number of children: N/A   • Years of education: N/A     Social History Main Topics   • Smoking status:  Never Smoker   • Smokeless tobacco: None   • Alcohol use No      Comment: !x yearly   • Drug use: No   • Sexual activity: Not Asked      Comment: Hysterectomy     Other Topics Concern   • None     Social History Narrative     ---------------------------------------------------------------------------------------------------------------------   Allergies:  Lorcet [hydrocodone-acetaminophen]  ---------------------------------------------------------------------------------------------------------------------   Prior to Admission Medications     Prescriptions Last Dose Informant Patient Reported? Taking?    amitriptyline (ELAVIL) 50 MG tablet   Yes No    Take 50 mg by mouth Every Night.    aspirin 81 MG EC tablet   Yes No    Take 81 mg by mouth Daily.    budesonide (PULMICORT) 0.5 MG/2ML nebulizer solution   No No    Take 2 mL by nebulization Daily.    butalbital-acetaminophen-caffeine (FIORICET) -40 MG per tablet   No No    Take 1-2 tablets by mouth Every 6 (Six) Hours As Needed for headaches.    furosemide (LASIX) 20 MG tablet   Yes No    Take 20 mg by mouth 2 (Two) Times a Day.    hydrochlorothiazide (HYDRODIURIL) 25 MG tablet   Yes No    Take 25 mg by mouth Daily.    losartan (COZAAR) 25 MG tablet   Yes No    Take 25 mg by mouth Daily.    montelukast (SINGULAIR) 10 MG tablet   Yes No    Take 10 mg by mouth Every Night.    Multiple Vitamins-Minerals (MULTIVITAMIN ADULT PO)   Yes No    Take  by mouth.    omeprazole (priLOSEC) 20 MG capsule   Yes No    Take 20 mg by mouth Daily.    pramipexole (MIRAPEX) 1.5 MG tablet   Yes No    Take 1.5 mg by mouth 3 (Three) Times a Day.    pravastatin (PRAVACHOL) 20 MG tablet   Yes No    Take 20 mg by mouth Daily.    predniSONE (DELTASONE) 10 MG tablet   No No    Take as directed per 6 day pred jolene    propranolol (INDERAL) 20 MG tablet   Yes No    Take 20 mg by mouth 2 (Two) Times a Day.    Respiratory Therapy Supplies (NEBULIZER/ADULT MASK) kit   No No    1 kit 3 (Three)  Times a Day As Needed (for cough and wheeze). Indications: please forward to home supply store    VITAMIN D, CHOLECALCIFEROL, PO   Yes No    Take  by mouth.        Hospital Scheduled Meds:    enoxaparin 1 mg/kg Subcutaneous Q12H   [START ON 7/18/2017] methIMAzole 10 mg Oral Daily       diltiaZEM 5-15 mg/hr Last Rate: 5 mg/hr (07/17/17 0735)   sodium chloride 125 mL/hr Last Rate: 125 mL/hr (07/17/17 0845)     ---------------------------------------------------------------------------------------------------------------------   Vital Signs:  Temp:  [98 °F (36.7 °C)-98.9 °F (37.2 °C)] 98.9 °F (37.2 °C)  Heart Rate:  [] 86  Resp:  [16-20] 18  BP: ()/(41-87) 135/87  Last 3 weights    07/17/17  0713 07/17/17  1137   Weight: 250 lb (113 kg) 254 lb 4.8 oz (115 kg)     Body mass index is 46.51 kg/(m^2).  SpO2 Readings from Last 3 Encounters:   07/17/17 100%   02/03/17 99%   01/06/17 96%     ---------------------------------------------------------------------------------------------------------------------   Physical Exam:  Constitutional:  Well-developed and well-nourished. Morbidly obese. Mild acute distress.  HENT:  Head: Normocephalic and atraumatic.  Mouth:  Moist mucous membranes.    Eyes:  Conjunctivae and EOM are normal.  Pupils are equal, round, and reactive to light.  No scleral icterus.  Neck:  Neck supple.  No JVD present.  (+) goiter.   Cardiovascular:  Normal rate, irregularly irregular rhythm and normal heart sounds with no murmur.  Pulmonary/Chest:  No respiratory distress, no wheezes, no crackles, with normal breath sounds and good air movement.  Abdominal:  Soft. Obese. Bowel sounds are normal.  No distension and no tenderness.   Musculoskeletal:  No edema, no tenderness, and no deformity.  No red or swollen joints anywhere.    Neurological:  Alert and oriented to person, place, and time.  No cranial nerve deficit.  No tongue deviation.  No facial droop.  No slurred speech.   Skin:  Skin is warm  and dry.  No rash noted.  No pallor.   Psychiatric:  Normal mood and affect.  Behavior is normal.  Judgment and thought content normal.   Peripheral vascular:  No edema and strong pulses on all 4 extremities.  Genitourinary: No garcia catheter in place, making urine.   ---------------------------------------------------------------------------------------------------------------------  EKG:        Telemetry:    Atrial fibrillation 80s-90s    I have personally reviewed the EKG/Telemetry strip  ---------------------------------------------------------------------------------------------------------------------     Results from last 7 days  Lab Units 07/17/17  0724   WBC 10*3/mm3 4.32*   HEMOGLOBIN g/dL 12.5   HEMATOCRIT % 37.5   MCV fL 85.4   MCHC g/dL 33.3   PLATELETS 10*3/mm3 326   INR  0.99           Results from last 7 days  Lab Units 07/17/17  0724   SODIUM mmol/L 141   POTASSIUM mmol/L 3.6   CHLORIDE mmol/L 108   CO2 mmol/L 28.1   BUN mg/dL 15   CREATININE mg/dL 0.54   EGFR IF NONAFRICN AM mL/min/1.73 120   CALCIUM mg/dL 10.1*   GLUCOSE mg/dL 127*   ALBUMIN g/dL 4.20   BILIRUBIN mg/dL 0.5   ALK PHOS U/L 70   AST (SGOT) U/L 27   ALT (SGPT) U/L 30   Estimated Creatinine Clearance: 151.4 mL/min (by C-G formula based on Cr of 0.54).  No results found for: AMMONIA          Lab Results   Component Value Date    HGBA1C 5.70 07/17/2017     Lab Results   Component Value Date    TSH <0.010 (L) 07/17/2017    FREET4 3.39 (H) 07/17/2017     No results found for: PREGTESTUR, PREGSERUM, HCG, HCGQUANT  Pain Management Panel     There is no flowsheet data to display.          I have personally reviewed the above laboratory results.   ---------------------------------------------------------------------------------------------------------------------  Imaging Results (last 7 days)     Procedure Component Value Units Date/Time    XR Chest 1 View [[id number]] Collected:  07/17/17 0816     Updated:  07/17/17 0822    Narrative:        XR CHEST 1 VW-  CLINICAL INDICATION: soa  COMPARISON: 02/03/2017   TECHNIQUE: Single frontal view of the chest.  FINDINGS:  There is no focal alveolar infiltrate or effusion.  The cardiac silhouette is normal. The pulmonary vasculature is  unremarkable.  There is no evidence of an acute osseous abnormality.   There are no suspicious-appearing parenchymal soft tissue nodules.    Impression:       No evidence of active or acute cardiopulmonary disease on today's chest  radiograph.         This report was finalized on 7/17/2017 8:17 AM by Dr. Colt Powell MD.        CT Head Without Contrast [[id number]]   Updated:  07/17/17 1047     Thyroid ultrasound 3/12/2015     I have personally reviewed the above radiology results.   ---------------------------------------------------------------------------------------------------------------------  Assessment and Plan:  -New onset atrial fibrillation with RVR; CHADS-VASc score is at least 2 which puts her at high risk for stroke  -Newly diagnosed hyperthyroidism with history of multinodular goiter   -Grade I diastolic dysfunction   -Pre-renal azotemia  -Mild leukopenia   -Mild hyperglycemia without known history of diabetes mellitus   -Essential hypertension, controlled   -Hyperlipidemia   -GERD  -Asthma   -History of vitamin D deficiency    -Rheumatoid arthritis   -Morbid obesity   -Sleep apnea   -Anxiety    Patient has been admitted to the telemetry floor for further evaluation and treatment. Continue Cardizem drip. Continue with Lovenox 1 mg/kg BID for anticoagulation. Cardiology has been consulted and input is much appreciated. Will order ECHO. Will trend cardiac enzymes X 3 to rule out ischemia/infarction. Will order a magnesium level; potassium is borderline; will supplement and try to keep closer to 4.0. Continue gentle IV fluid hydration. Will begin Methimazole for hyperthyroidism. Thyroid ultrasound ordered and pending. Patient will need follow up with endocrinologist  in outpatient setting per recommendation of Dr. Aldana in one week. Continue other supportive care.      Hemoglobin A1C ordered to further evaluate hyperglycemia. Will continue home medications once reconciled per pharmacy with holding parameters and as tolerated. Currently holding BP meds as patient is borderline hypotensive.     Will order Zofran and a GI cocktail x1.    Will obtain medical records from patient's cardiologist. Will repeat labs in the morning and continue to monitor the patient closely on telemetry.     DVT Prophylaxis: Lovenox 1 mg/kg Q12H    The patient is considered to be a high risk patient due to: Atrial fibrillation with RVR, hyperthyroidism, multinodular goiter, morbid obesity    I have discussed the patient's assessment and plan with the patient and BRITTNEY Tenorio.     LEVI Huynh  07/17/17  12:09 PM  ---------------------------------------------------------------------------------------------------------------------   *I have discussed with Sabra Pascal and agree with her assessment. I have evaluated the patient independently and have edited/amended this note to reflect my findings and treatment plan/recommendations.

## 2017-07-17 NOTE — ED PROVIDER NOTES
Subjective   History of Present Illness    Review of Systems    Past Medical History:   Diagnosis Date   • Acid reflux    • Allergic    • Asthma    • Atrial fibrillation    • Hyperlipidemia    • Hypertension    • Hyperthyroidism    • Morbid obesity    • Vitamin D deficiency        Allergies   Allergen Reactions   • Lorcet [Hydrocodone-Acetaminophen] Other (See Comments)     Rash that bleeds         Past Surgical History:   Procedure Laterality Date   •  SECTION      x2   • FOOT SURGERY     • GASTRIC SLEEVE LAPAROSCOPIC      2 years ago   • HYSTERECTOMY      Complete   • KNEE SURGERY      2 (1 was partial knee replacement)   • NOSE SURGERY         Family History   Problem Relation Age of Onset   • No Known Problems Mother    • No Known Problems Father        Social History     Social History   • Marital status:      Spouse name: N/A   • Number of children: N/A   • Years of education: N/A     Social History Main Topics   • Smoking status: Never Smoker   • Smokeless tobacco: None   • Alcohol use No      Comment: !x yearly   • Drug use: No   • Sexual activity: Not Asked      Comment: Hysterectomy     Other Topics Concern   • None     Social History Narrative           Objective   Physical Exam    Procedures         ED Course  ED Course   Value Comment By Time   ECG 12 Lead Atrial fibrillation with rapid ventricular response. Rate 149. Nonspecific ST-T wave change. No evidence for acute ischemia. Vera Perry, APRN  [id number]    Care transferred BEVERLY Narayananbaljit Yolanda Perry, APRN  [id number]    Took over for Vera.  Patient reports that she had a history of A. fib a couple years ago.  She is followed by Dr. Bass in [place].  She also sees an endocrinologist and [facility].  She states that she's never been hyperthyroid before though today's TSH is less than 0.01.  She denies any other complaints.  She states that she has had intermittent episodes over the past week for she's felt her  heart racing. LEVI Choi 07/17 0830    D/w Dr Aldana= Tapazole 10mg qam f/u in office in 1 week. D/w Dr Alberto who feels patient should potentially be admitted. Left message for Dr Burgess to call back LEVI Choi 07/17 0958    D/w Dr Valadez and Dr Aldana. Orders placed LEVI Choi 07/17 1016                  MDM  Number of Diagnoses or Management Options  Rapid atrial fibrillation: established and worsening  Thyrotoxicosis with thyrotoxic crisis, unspecified thyrotoxicosis type: new and requires workup     Amount and/or Complexity of Data Reviewed  Clinical lab tests: ordered and reviewed  Tests in the radiology section of CPT®: ordered and reviewed  Tests in the medicine section of CPT®: ordered and reviewed  Decide to obtain previous medical records or to obtain history from someone other than the patient: yes  Discuss the patient with other providers: yes    Risk of Complications, Morbidity, and/or Mortality  Presenting problems: moderate        Final diagnoses:   Rapid atrial fibrillation   Thyrotoxicosis with thyrotoxic crisis, unspecified thyrotoxicosis type            LEVI Choi  07/17/17 1200

## 2017-07-18 LAB
ALBUMIN SERPL-MCNC: 3.6 G/DL (ref 3.5–5)
ALBUMIN/GLOB SERPL: 1.3 G/DL (ref 1.5–2.5)
ALP SERPL-CCNC: 58 U/L (ref 35–104)
ALT SERPL W P-5'-P-CCNC: 26 U/L (ref 10–36)
ANION GAP SERPL CALCULATED.3IONS-SCNC: 4.6 MMOL/L (ref 3.6–11.2)
AST SERPL-CCNC: 24 U/L (ref 10–30)
BASOPHILS # BLD AUTO: 0.02 10*3/MM3 (ref 0–0.3)
BASOPHILS NFR BLD AUTO: 0.4 % (ref 0–2)
BH CV ECHO MEAS - % IVS THICK: 28 %
BH CV ECHO MEAS - % LVPW THICK: 110 %
BH CV ECHO MEAS - ACS: 2 CM
BH CV ECHO MEAS - AO ROOT AREA (BSA CORRECTED): 1.4
BH CV ECHO MEAS - AO ROOT AREA: 6.6 CM^2
BH CV ECHO MEAS - AO ROOT DIAM: 2.9 CM
BH CV ECHO MEAS - BSA(HAYCOCK): 2.3 M^2
BH CV ECHO MEAS - BSA: 2.1 M^2
BH CV ECHO MEAS - BZI_BMI: 46.5 KILOGRAMS/M^2
BH CV ECHO MEAS - BZI_METRIC_HEIGHT: 157.5 CM
BH CV ECHO MEAS - BZI_METRIC_WEIGHT: 115.2 KG
BH CV ECHO MEAS - CONTRAST EF 4CH: 58.8 ML/M^2
BH CV ECHO MEAS - EDV(CUBED): 103.1 ML
BH CV ECHO MEAS - EDV(MOD-SP4): 51 ML
BH CV ECHO MEAS - EDV(TEICH): 101.8 ML
BH CV ECHO MEAS - EF(CUBED): 73.9 %
BH CV ECHO MEAS - EF(MOD-SP4): 58.8 %
BH CV ECHO MEAS - EF(TEICH): 65.7 %
BH CV ECHO MEAS - ESV(CUBED): 26.9 ML
BH CV ECHO MEAS - ESV(MOD-SP4): 21 ML
BH CV ECHO MEAS - ESV(TEICH): 34.9 ML
BH CV ECHO MEAS - FS: 36.1 %
BH CV ECHO MEAS - IVS/LVPW: 1.3
BH CV ECHO MEAS - IVSD: 1.1 CM
BH CV ECHO MEAS - IVSS: 1.4 CM
BH CV ECHO MEAS - LA DIMENSION: 3.2 CM
BH CV ECHO MEAS - LA/AO: 1.1
BH CV ECHO MEAS - LV DIASTOLIC VOL/BSA (35-75): 24.1 ML/M^2
BH CV ECHO MEAS - LV MASS(C)D: 158.7 GRAMS
BH CV ECHO MEAS - LV MASS(C)DI: 75 GRAMS/M^2
BH CV ECHO MEAS - LV MASS(C)S: 177.3 GRAMS
BH CV ECHO MEAS - LV MASS(C)SI: 83.8 GRAMS/M^2
BH CV ECHO MEAS - LV SYSTOLIC VOL/BSA (12-30): 9.9 ML/M^2
BH CV ECHO MEAS - LVIDD: 4.7 CM
BH CV ECHO MEAS - LVIDS: 3 CM
BH CV ECHO MEAS - LVLD AP4: 6.2 CM
BH CV ECHO MEAS - LVLS AP4: 5.7 CM
BH CV ECHO MEAS - LVOT AREA (M): 2.5 CM^2
BH CV ECHO MEAS - LVOT AREA: 2.5 CM^2
BH CV ECHO MEAS - LVOT DIAM: 1.8 CM
BH CV ECHO MEAS - LVPWD: 0.87 CM
BH CV ECHO MEAS - LVPWS: 1.8 CM
BH CV ECHO MEAS - PA ACC SLOPE: 2285 CM/SEC^2
BH CV ECHO MEAS - PA ACC TIME: 0.05 SEC
BH CV ECHO MEAS - PA PR(ACCEL): 56.8 MMHG
BH CV ECHO MEAS - RAP SYSTOLE: 10 MMHG
BH CV ECHO MEAS - RVDD: 3.1 CM
BH CV ECHO MEAS - RVSP: 31.6 MMHG
BH CV ECHO MEAS - SI(CUBED): 36 ML/M^2
BH CV ECHO MEAS - SI(MOD-SP4): 14.2 ML/M^2
BH CV ECHO MEAS - SI(TEICH): 31.6 ML/M^2
BH CV ECHO MEAS - SV(CUBED): 76.2 ML
BH CV ECHO MEAS - SV(MOD-SP4): 30 ML
BH CV ECHO MEAS - SV(TEICH): 66.9 ML
BH CV ECHO MEAS - TR MAX VEL: 232.4 CM/SEC
BILIRUB SERPL-MCNC: 0.4 MG/DL (ref 0.2–1.8)
BUN BLD-MCNC: 13 MG/DL (ref 7–21)
BUN/CREAT SERPL: 22.4 (ref 7–25)
CALCIUM SPEC-SCNC: 9 MG/DL (ref 7.7–10)
CHLORIDE SERPL-SCNC: 110 MMOL/L (ref 99–112)
CK MB SERPL-CCNC: <0.18 NG/ML (ref 0–5)
CK MB SERPL-RTO: NORMAL % (ref 0–3)
CK SERPL-CCNC: 27 U/L (ref 24–173)
CO2 SERPL-SCNC: 25.4 MMOL/L (ref 24.3–31.9)
CREAT BLD-MCNC: 0.58 MG/DL (ref 0.43–1.29)
CRP SERPL-MCNC: <0.5 MG/DL (ref 0–0.99)
DEPRECATED RDW RBC AUTO: 37.4 FL (ref 37–54)
EOSINOPHIL # BLD AUTO: 0.1 10*3/MM3 (ref 0–0.7)
EOSINOPHIL NFR BLD AUTO: 1.9 % (ref 0–5)
ERYTHROCYTE [DISTWIDTH] IN BLOOD BY AUTOMATED COUNT: 12 % (ref 11.5–14.5)
GFR SERPL CREATININE-BSD FRML MDRD: 110 ML/MIN/1.73
GLOBULIN UR ELPH-MCNC: 2.7 GM/DL
GLUCOSE BLD-MCNC: 123 MG/DL (ref 70–110)
HCT VFR BLD AUTO: 34.2 % (ref 37–47)
HGB BLD-MCNC: 11.2 G/DL (ref 12–16)
IMM GRANULOCYTES # BLD: 0 10*3/MM3 (ref 0–0.03)
IMM GRANULOCYTES NFR BLD: 0 % (ref 0–0.5)
LYMPHOCYTES # BLD AUTO: 1.75 10*3/MM3 (ref 1–3)
LYMPHOCYTES NFR BLD AUTO: 34 % (ref 21–51)
MCH RBC QN AUTO: 28.7 PG (ref 27–33)
MCHC RBC AUTO-ENTMCNC: 32.7 G/DL (ref 33–37)
MCV RBC AUTO: 87.7 FL (ref 80–94)
MONOCYTES # BLD AUTO: 0.62 10*3/MM3 (ref 0.1–0.9)
MONOCYTES NFR BLD AUTO: 12 % (ref 0–10)
MYOGLOBIN SERPL-MCNC: 35 NG/ML (ref 0–109)
NEUTROPHILS # BLD AUTO: 2.66 10*3/MM3 (ref 1.4–6.5)
NEUTROPHILS NFR BLD AUTO: 51.7 % (ref 30–70)
OSMOLALITY SERPL CALC.SUM OF ELEC: 280.9 MOSM/KG (ref 273–305)
PHOSPHATE SERPL-MCNC: 3.9 MG/DL (ref 2.7–4.5)
PLATELET # BLD AUTO: 296 10*3/MM3 (ref 130–400)
PMV BLD AUTO: 10.4 FL (ref 6–10)
POTASSIUM BLD-SCNC: 4.2 MMOL/L (ref 3.5–5.3)
PROT SERPL-MCNC: 6.3 G/DL (ref 6–8)
RBC # BLD AUTO: 3.9 10*6/MM3 (ref 4.2–5.4)
SODIUM BLD-SCNC: 140 MMOL/L (ref 135–153)
TROPONIN I SERPL-MCNC: <0.006 NG/ML
WBC NRBC COR # BLD: 5.15 10*3/MM3 (ref 4.5–12.5)

## 2017-07-18 PROCEDURE — 82553 CREATINE MB FRACTION: CPT | Performed by: PHYSICIAN ASSISTANT

## 2017-07-18 PROCEDURE — 93005 ELECTROCARDIOGRAM TRACING: CPT | Performed by: INTERNAL MEDICINE

## 2017-07-18 PROCEDURE — 94799 UNLISTED PULMONARY SVC/PX: CPT

## 2017-07-18 PROCEDURE — 99233 SBSQ HOSP IP/OBS HIGH 50: CPT | Performed by: INTERNAL MEDICINE

## 2017-07-18 PROCEDURE — 85025 COMPLETE CBC W/AUTO DIFF WBC: CPT | Performed by: INTERNAL MEDICINE

## 2017-07-18 PROCEDURE — 84100 ASSAY OF PHOSPHORUS: CPT | Performed by: PHYSICIAN ASSISTANT

## 2017-07-18 PROCEDURE — 86140 C-REACTIVE PROTEIN: CPT | Performed by: INTERNAL MEDICINE

## 2017-07-18 PROCEDURE — 84484 ASSAY OF TROPONIN QUANT: CPT | Performed by: PHYSICIAN ASSISTANT

## 2017-07-18 PROCEDURE — 83874 ASSAY OF MYOGLOBIN: CPT | Performed by: PHYSICIAN ASSISTANT

## 2017-07-18 PROCEDURE — 25010000002 PROMETHAZINE PER 50 MG: Performed by: PHYSICIAN ASSISTANT

## 2017-07-18 PROCEDURE — 99254 IP/OBS CNSLTJ NEW/EST MOD 60: CPT | Performed by: INTERNAL MEDICINE

## 2017-07-18 PROCEDURE — 82550 ASSAY OF CK (CPK): CPT | Performed by: PHYSICIAN ASSISTANT

## 2017-07-18 PROCEDURE — 80053 COMPREHEN METABOLIC PANEL: CPT | Performed by: INTERNAL MEDICINE

## 2017-07-18 PROCEDURE — 25010000002 ENOXAPARIN PER 10 MG: Performed by: INTERNAL MEDICINE

## 2017-07-18 RX ORDER — ACETAMINOPHEN AND CODEINE PHOSPHATE 300; 30 MG/1; MG/1
1 TABLET ORAL EVERY 6 HOURS PRN
Status: DISCONTINUED | OUTPATIENT
Start: 2017-07-18 | End: 2017-07-20 | Stop reason: HOSPADM

## 2017-07-18 RX ORDER — METOPROLOL TARTRATE 50 MG/1
50 TABLET, FILM COATED ORAL EVERY 12 HOURS SCHEDULED
Status: DISCONTINUED | OUTPATIENT
Start: 2017-07-18 | End: 2017-07-20 | Stop reason: HOSPADM

## 2017-07-18 RX ADMIN — PRAMIPEXOLE DIHYDROCHLORIDE 1 MG: 1 TABLET ORAL at 20:41

## 2017-07-18 RX ADMIN — ACETAMINOPHEN 650 MG: 325 TABLET ORAL at 05:35

## 2017-07-18 RX ADMIN — SODIUM CHLORIDE 50 ML/HR: 9 INJECTION, SOLUTION INTRAVENOUS at 15:36

## 2017-07-18 RX ADMIN — MONTELUKAST SODIUM 10 MG: 10 TABLET ORAL at 04:59

## 2017-07-18 RX ADMIN — ACETAMINOPHEN AND CODEINE PHOSPHATE 1 TABLET: 30; 300 TABLET ORAL at 09:39

## 2017-07-18 RX ADMIN — PRAMIPEXOLE DIHYDROCHLORIDE 0.5 MG: 0.12 TABLET ORAL at 04:58

## 2017-07-18 RX ADMIN — ATORVASTATIN CALCIUM 10 MG: 10 TABLET, FILM COATED ORAL at 20:41

## 2017-07-18 RX ADMIN — PROMETHAZINE HYDROCHLORIDE 12.5 MG: 25 INJECTION INTRAMUSCULAR; INTRAVENOUS at 12:26

## 2017-07-18 RX ADMIN — ENOXAPARIN SODIUM 110 MG: 60 INJECTION SUBCUTANEOUS at 00:02

## 2017-07-18 RX ADMIN — METHIMAZOLE 10 MG: 10 TABLET ORAL at 14:51

## 2017-07-18 RX ADMIN — METOPROLOL TARTRATE 50 MG: 50 TABLET, FILM COATED ORAL at 13:10

## 2017-07-18 RX ADMIN — ACETAMINOPHEN AND CODEINE PHOSPHATE 1 TABLET: 30; 300 TABLET ORAL at 15:37

## 2017-07-18 RX ADMIN — PANTOPRAZOLE SODIUM 40 MG: 40 TABLET, DELAYED RELEASE ORAL at 04:59

## 2017-07-18 RX ADMIN — METHIMAZOLE 10 MG: 10 TABLET ORAL at 20:41

## 2017-07-18 RX ADMIN — PROPRANOLOL HYDROCHLORIDE 20 MG: 20 TABLET ORAL at 08:35

## 2017-07-18 RX ADMIN — RIVAROXABAN 20 MG: 20 TABLET, FILM COATED ORAL at 18:06

## 2017-07-18 RX ADMIN — DILTIAZEM HYDROCHLORIDE 5 MG/HR: 100 INJECTION, POWDER, LYOPHILIZED, FOR SOLUTION INTRAVENOUS at 05:39

## 2017-07-18 RX ADMIN — METOPROLOL TARTRATE 50 MG: 50 TABLET, FILM COATED ORAL at 20:41

## 2017-07-18 RX ADMIN — Medication 1 TABLET: at 08:35

## 2017-07-18 RX ADMIN — METHIMAZOLE 10 MG: 10 TABLET ORAL at 04:58

## 2017-07-18 RX ADMIN — ASPIRIN 81 MG: 81 TABLET ORAL at 08:36

## 2017-07-18 RX ADMIN — ENOXAPARIN SODIUM 110 MG: 60 INJECTION SUBCUTANEOUS at 12:35

## 2017-07-18 RX ADMIN — AMITRIPTYLINE HYDROCHLORIDE 100 MG: 50 TABLET, FILM COATED ORAL at 16:06

## 2017-07-18 NOTE — PLAN OF CARE
Problem: Patient Care Overview (Adult)  Goal: Plan of Care Review  Outcome: Ongoing (interventions implemented as appropriate)    Problem: Arrhythmia/Dysrhythmia (Symptomatic) (Adult)  Goal: Signs and Symptoms of Listed Potential Problems Will be Absent or Manageable (Arrhythmia/Dysrhythmia)  Outcome: Ongoing (interventions implemented as appropriate)

## 2017-07-18 NOTE — PHARMACY PATIENT ASSISTANCE
Pharmacy was consulted by Dr. Aldana to evaluate patient co-pay for newer anticoagulants for treatment of A fib. The patients co-pay for Eliquis would be $117.50 and Xarelto $90.68 for a 1 month supply;however, the patient is eligible for both a free 30 day trial card as well as a copay card that can bring the co-pay down to $10 (Eliquis) or $0 co-pay  (Xarelto). Please let pharmacy know which anticoagulant the patient will be discharged on so that we may begin any necessary steps before the discharge process.    Thank you,  Rhonda Del Castillo. [facility]  07/18/17  2:22 PM

## 2017-07-18 NOTE — CONSULTS
Date of Admit: 2017  Date of Consult: 17  No ref. provider found      Active Problems:    Rapid atrial fibrillation        Assessment:    1. New onset atrial fibrillation with rapid ventricular response.  Her chadsvasc score is 2 female gender and hypertension.  2. Long-standing hypertension (labile) with possible hypertensive heart disease as evidenced by left foot hypertrophy and atrial fibrillation.  3. Chest pains with some typical and some atypical features for CCS class III angina.  4. Strongly positive family history of premature coronary artery disease with her mother reportedly having had heart attack at age 42.  5. Multinodular goiter with hyperthyroidism being treated with methimazole  6. Dyslipidemia, on pravastatin at home  7. GERD  8. Rheumatoid arthritis  9. Morbid obesity , status post gastric sleeve surgery about 2 years ago.  10. History of asthma      Recommendations:    1. For her atrial fibrillation, since patient is a converted back to sinus rhythm and since this is most probably  related to her hyperthyroidism, we'll treat her with a cardioselective beta blocker (as patient has history of asthma) and start her on a chronic oral anticoagulation with one of the newer oral anticoagulants, which are his affordable for her.  (Will have the pharmacy to check on the cost of these agents for her)  2. With regards to her chest pains, since she has a strongly positive family history of premature coronary artery disease (her mother reportedly  from heart attack at 42), we will evaluate further with Lexiscan sestamibi study.  3. I've instructed her to cut back on consumption of caffeinated beverages (patient drinks 2 cups of coffee and 4 cans of caffeinated soda daily)    Reason for consultation: New onset atrial fibrillation and chest pains.    Subjective     History of Present Illness: Ms. Hayes is a pleasant 51-year-old  female with history of long-standing hypertension which  apparently is labile, presented with complaints of palpitations that she's been having on and off for the last couple of years.  She initially started to have palpitations more than 2 years ago and was diagnosed to have atrial fibrillation at that time.  These episodes were short-lived and usually reverted back to sinus rhythm spontaneously.  She subsequently underwent a gastric sleeve surgery for obesity and lost weight and then felt better with no further significant palpitations or episodes of atrial fibrillation.  She has subsequently gained some weight more recently and started to have recurrent palpitations over the last few months.  Her blood pressures also been fluctuating with the uncontrolled blood pressures at times as high as 230 systolic and 110  Diastolic.  When she has the palpitations with rapid heart beat, she starts to have chest pain and discomfort which she describes as sharp to dull pains and tightness associated with shortness of breath.  These are off of moderate intensity (6/10).  There are nonradiating.  When the heart rate slows down she tends to feel better.  Since admission she's been started on IV diltiazem and she has converted back to sinus rhythm.    Mr. Hayes has history of goiter which is being evaluated by the endocrinologist.  Her recent evaluation apparently revealed normal thyroid function tests.  However at presentation to the ED, her TSH was very low suggestive of possible hyperthyroidism.  On further questioning Mrs. Hayes states that she has been having episodes of sweating and tremulousness but the next minute she would feel cold and she will go through these phases periodically.  Since admission she has been started on methimazole.  She has a family history of thyroid goiter with her sister having had this similar problem and underwent thyroidectomy.  She apparently also had atrial fibrillation.  Her initial EKG revealed atrial fibrillation with rapid ventricular rate  up to 160 bpm but she was converted back to sinus rhythm and is currently running in the 70s to 80s.    Mrs. Hayes admits to drinking 2 cups of coffee and 4 cans of caffeinated beverages daily.  She is a nonsmoker.  She has no previous history of known coronary artery disease.      Past Medical History:   Diagnosis Date   • Acid reflux    • Allergic    • Anxiety    • Asthma    • Atrial fibrillation    • Diastolic dysfunction     Grade I   • Gout    • Hyperlipidemia    • Hypertension    • Hyperthyroidism    • Morbid obesity    • RA (rheumatoid arthritis)    • Sleep apnea    • Vitamin D deficiency      Past Surgical History:   Procedure Laterality Date   •  SECTION      x2   • FOOT SURGERY     • GASTRIC SLEEVE LAPAROSCOPIC      2 years ago   • HYSTERECTOMY      Complete   • KNEE SURGERY      2 (1 was partial knee replacement)   • NOSE SURGERY       Family History   Problem Relation Age of Onset   • No Known Problems Mother    • No Known Problems Father      Social History   Substance Use Topics   • Smoking status: Never Smoker   • Smokeless tobacco: None   • Alcohol use No      Comment: !x yearly     Prescriptions Prior to Admission   Medication Sig Dispense Refill Last Dose   • albuterol (PROVENTIL HFA;VENTOLIN HFA) 108 (90 BASE) MCG/ACT inhaler Inhale 2 puffs As Needed for Wheezing.   2017 at AM   • amitriptyline (ELAVIL) 100 MG tablet Take 100 mg by mouth Every Evening.   2017 at PM   • aspirin 81 MG EC tablet Take 81 mg by mouth Daily. Patient states that she will sometimes take 2 if she needs it. Took two on 17 at PM   • cyclobenzaprine (FLEXERIL) 5 MG tablet Take 5 mg by mouth 3 (Three) Times a Day As Needed for Muscle Spasms.   7/15/2017 at Unknown   • losartan (COZAAR) 50 MG tablet Take 50 mg by mouth Daily.   2017 at Unknown time   • montelukast (SINGULAIR) 10 MG tablet Take 10 mg by mouth Every Morning.   2017 at AM   • multivitamin (DAILY ARACELI) tablet tablet  Take 1 tablet by mouth Daily.   7/15/2017 at Unknown   • potassium chloride (MICRO-K) 10 MEQ CR capsule Take 10 mEq by mouth Daily As Needed.   7/16/2017 at PM   • pramipexole (MIRAPEX) 0.5 MG tablet Take 0.5 mg by mouth Every Morning. Doctors office states to take 1 mg twice daily, patient states that she takes 0.5 mg in the morning and 1 mg at night.   7/16/2017 at Unknown time   • pramipexole (MIRAPEX) 1 MG tablet Take 1 mg by mouth Every Night. Doctors office states to take 1 mg twice daily, patient states that she takes 0.5 mg in the morning and 1 mg at night.   7/16/2017 at Unknown time   • pravastatin (PRAVACHOL) 10 MG tablet Take 10 mg by mouth Every Night.   7/15/2017 at PM   • propranolol (INDERAL) 20 MG tablet Take 20 mg by mouth 2 (Two) Times a Day. Patient states that doctor told her to take more if she needed to. Took more than prescribed on 07/16/17.   7/16/2017 at PM   • ranitidine (ZANTAC) 150 MG capsule Take 300 mg by mouth Every Night.   7/16/2017 at PM   • hydrochlorothiazide (HYDRODIURIL) 25 MG tablet Take 25 mg by mouth Daily. Patient states that she will not take if she has had to take the Lasix.   7/15/2017 at Unknown   • omeprazole (priLOSEC) 20 MG capsule Take 20 mg by mouth Daily.   Unknown at Unknown time     Allergies:  Lorcet [hydrocodone-acetaminophen]    Review of Systems   Constitutional: Positive for fatigue. Negative for appetite change, chills and fever.   HENT: Negative for congestion, ear discharge, ear pain and sore throat.    Eyes: Negative for pain and redness.   Respiratory: Positive for shortness of breath. Negative for cough and wheezing.    Cardiovascular: Positive for chest pain and palpitations. Negative for leg swelling.   Gastrointestinal: Negative for abdominal pain, diarrhea, nausea and vomiting.   Endocrine: Positive for cold intolerance and heat intolerance. Negative for polydipsia and polyuria.   Genitourinary: Negative for dysuria and hematuria.   Skin: Negative  for rash.   Neurological: Negative for seizures, syncope and headaches.   Psychiatric/Behavioral: Negative for confusion. The patient is not nervous/anxious.          Objective      Vital Signs  Temp:  [97.7 °F (36.5 °C)-98.9 °F (37.2 °C)] 97.7 °F (36.5 °C)  Heart Rate:  [] 79  Resp:  [18-20] 20  BP: (106-135)/(49-87) 107/62  Vital Signs (last 72 hrs)       07/15 0700  -  07/16 0659 07/16 0700  -  07/17 0659 07/17 0700  -  07/18 0659 07/18 0700  -  07/18 1117   Most Recent    Temp (°F)     98 -  98.9      97.7     97.7 (36.5)    Heart Rate     73 -  (!)138      79     79    Resp     16 -  20      20     20    BP     95/54 -  135/87      107/62     107/62    SpO2 (%)     96 -  100    98 -  99     98        Body mass index is 47.9 kg/(m^2).    Intake/Output Summary (Last 24 hours) at 07/18/17 1117  Last data filed at 07/18/17 0331   Gross per 24 hour   Intake             1600 ml   Output             1800 ml   Net             -200 ml     Physical Exam   Constitutional: She appears well-developed and well-nourished.   Pleasant  female who is moderately obese, alert, oriented ×3 and is in no apparent distress at this time.   HENT:   Head: Normocephalic and atraumatic.   Eyes: EOM are normal. No scleral icterus.   Neck: No JVD present. No tracheal deviation present. No thyromegaly present.   Cardiovascular: Normal rate, regular rhythm and normal heart sounds.  Exam reveals no gallop and no friction rub.    No murmur heard.  Pulses:       Carotid pulses are 2+ on the right side, and 2+ on the left side.       Radial pulses are 2+ on the right side, and 2+ on the left side.        Femoral pulses are 2+ on the right side, and 2+ on the left side.       Popliteal pulses are 2+ on the right side, and 2+ on the left side.        Dorsalis pedis pulses are 2+ on the right side, and 2+ on the left side.        Posterior tibial pulses are 2+ on the right side, and 2+ on the left side.   Pulmonary/Chest: No  respiratory distress. She has no wheezes. She has no rales.   Abdominal: She exhibits no distension and no mass. There is no tenderness. There is no guarding.   Musculoskeletal: She exhibits no edema.   Neurological: She is alert. No cranial nerve deficit.   Skin: Skin is warm and dry.   Psychiatric: She has a normal mood and affect.         Results Review:    I reviewed the patient's new clinical results.    Results from last 7 days  Lab Units 07/18/17  0041 07/17/17  1721 07/17/17  1202   CK TOTAL U/L 27 32 29   TROPONIN I ng/mL <0.006 <0.006 <0.006   CKMB ng/mL <0.18 <0.18 <0.18   MYOGLOBIN ng/mL 35.0 31.0 28.0       Results from last 7 days  Lab Units 07/18/17  0041 07/17/17  1202 07/17/17  0724   WBC 10*3/mm3 5.15 4.35* 4.32*   HEMOGLOBIN g/dL 11.2* 11.8* 12.5   PLATELETS 10*3/mm3 296 299 326       Results from last 7 days  Lab Units 07/18/17  0041 07/17/17  0724   SODIUM mmol/L 140 141   POTASSIUM mmol/L 4.2 3.6   CHLORIDE mmol/L 110 108   CO2 mmol/L 25.4 28.1   BUN mg/dL 13 15   CREATININE mg/dL 0.58 0.54   CALCIUM mg/dL 9.0 10.1*   GLUCOSE mg/dL 123* 127*   ALT (SGPT) U/L 26 30   AST (SGOT) U/L 24 27     Lab Results   Component Value Date    INR 1.03 07/17/2017    INR 0.99 07/17/2017     Lab Results   Component Value Date    MG 1.7 07/17/2017     Lab Results   Component Value Date    TSH <0.010 (L) 07/17/2017    CHLPL 164 03/10/2015    TRIG 82 03/10/2015    HDL 49 (L) 03/10/2015    LDL 99 03/10/2015      No results found for: BNP    ECG       Test Reason : chest pain  Blood Pressure : **/** mmHG  Vent. Rate : 099 BPM     Atrial Rate : 166 BPM     P-R Int : 000 ms          QRS Dur : 076 ms      QT Int : 360 ms       P-R-T Axes : 000 -01 009 degrees     QTc Int : 462 ms    Atrial fibrillation  Low voltage QRS  Cannot rule out Anterior infarct , age undetermined  Abnormal ECG  When compared with ECG of 17-JUL-2017 07:06,  Vent. rate has decreased BY  50 BPM    Referred By:  SELENA           Confirmed By:     ECG  12 Lead [[id number]]   Collected:  07/18/17 0017     Updated:  07/18/17 1026    Narrative:       Test Reason : possible rhythm change  Blood Pressure : **/** mmHG  Vent. Rate : 090 BPM     Atrial Rate : 090 BPM     P-R Int : 152 ms          QRS Dur : 080 ms      QT Int : 386 ms       P-R-T Axes : 059 015 036 degrees     QTc Int : 472 ms    Normal sinus rhythm  Low voltage QRS  Cannot rule out Anterior infarct (cited on or before 17-JUL-2017)  Abnormal ECG    Confirmed by Tea Starks ([id number]) on 7/18/2017 10:26:02 AM    Referred By:  SELEAN           Confirmed By:Tea Starks          Adult Transthoracic Echo Complete [[id number]] Collected:  07/17/17 1356     Updated:  07/18/17 1116     BSA 2.1 m^2      BH CV ECHO TESFAYE - RVDD 3.1 cm      IVSd 1.1 cm      IVSs 1.4 cm      LVIDd 4.7 cm      LVIDs 3.0 cm      LVPWd 0.87 cm      IVS/LVPW 1.3     FS 36.1 %      EDV(Teich) 101.8 ml      ESV(Teich) 34.9 ml      EF(Teich) 65.7 %      EDV(cubed) 103.1 ml      ESV(cubed) 26.9 ml      EF(cubed) 73.9 %      SI(cubed) 36.0 ml/m^2      Ao root diam 2.9 cm      ACS 2.0 cm      LA dimension 3.2 cm     Narrative:       · Normal left ventricular cavity size noted. All left ventricular wall   segments contract normally. Left ventricular wall thickness is consistent   with mild concentric hypertrophy.  · Estimated EF appears to be in the range of 61 - 65%.  · The aortic valve is structurally normal. No aortic valve regurgitation   is present. No aortic valve stenosis is present.  · The mitral valve is normal in structure. No mitral valve regurgitation   is present. No significant mitral valve stenosis is present.  · The tricuspid valve is normal. No tricuspid valve stenosis is present.   No tricuspid valve regurgitation is present. Estimated right ventricular   systolic pressure from tricuspid regurgitation is normal (<35 mmHg).  · The pulmonic valve is structurally normal. There is no significant   pulmonic valve stenosis present.  There is no pulmonic valve regurgitation   present.  · There is no evidence of pericardial effusion.             Imaging Results (last 72 hours)     Procedure Component Value Units Date/Time    XR Chest 1 View [[id number]] Collected:  07/17/17 0816     Updated:  07/17/17 0822    Narrative:       XR CHEST 1 VW-     CLINICAL INDICATION: soa          COMPARISON: 02/03/2017      TECHNIQUE: Single frontal view of the chest.     FINDINGS:     There is no focal alveolar infiltrate or effusion.  The cardiac silhouette is normal. The pulmonary vasculature is  unremarkable.  There is no evidence of an acute osseous abnormality.   There are no suspicious-appearing parenchymal soft tissue nodules.            Impression:       No evidence of active or acute cardiopulmonary disease on today's chest  radiograph.         This report was finalized on 7/17/2017 8:17 AM by Dr. Colt Powell MD.       CT Head Without Contrast [[id number]] Collected:  07/17/17 1052     Updated:  07/17/17 1055    Narrative:       CT HEAD WO CONTRAST-     CLINICAL INDICATION: headache          COMPARISON: 02/03/2017      TECHNIQUE: Axial images of the brain were obtained with out intravenous  contrast.  Reformatted images were created in the sagittal and coronal  planes.     DOSE: 890.74 mGy.cm     Radiation dose reduction techniques were utilized per ALARA protocol.  Automated exposure control was initiated through either or [employer] or  [employer] software packages by  protocol.           FINDINGS:   Today's study shows no mass, hemorrhage, or midline shift.   The ventricles, cisterns, and sulci are unremarkable. There is no  hydrocephalus.   There is no evidence of acute ischemia.  I do not see epidural or subdural hematoma.  The gray-white differentiation is appropriate.   The bone window setting images show no destructive calvarial lesion or  acute calvarial fracture.   The posterior fossa is unremarkable.             Impression:       No  acute intracranial pathology. Nothing is seen on this exam to  specifically account for the patient's symptoms.     This report was finalized on 7/17/2017 10:53 AM by Dr. Colt Powell MD.       US Thyroid [[id number]] Collected:  07/17/17 1509     Updated:  07/17/17 1516    Narrative:       EXAMINATION: ULTRASOUND THYROID-      CLINICAL INDICATION: Goiter, abnormal thyroid laboratory studies;  I48.91-Unspecified atrial fibrillation; E05.91-Thyrotoxicosis,  unspecified with thyrotoxic crisis or storm.       COMPARISON: 03/12/2015.     Sonographic imaging of the thyroid demonstrates heterogeneity of the  gland. The right lobe has a 2 cm heterogeneous mass and the left lobe a  1.4 cm heterogeneous mass. The appearance is very similar to the  previous exam.       Impression:       Heterogeneous appearance of the thyroid, probable  multinodular goiter.      This report was finalized on 7/17/2017 3:14 PM by Dr. Colt Powell MD.               Thank you very much for asking us to be involved in this patient's care.  We will follow along with you.      Sukh Aldana MD,[facility]  07/18/17  11:17 AM    Dragon disclaimer:  Much of this encounter note is an electronic transcription/translation of spoken language to printed text. The electronic translation of spoken language may permit erroneous, or at times, nonsensical words or phrases to be inadvertently transcribed; Although I have reviewed the note for such errors, some may still exist.

## 2017-07-18 NOTE — PAYOR COMM NOTE
"Lenore  [phone] fax [phone]      Ondina Lamar (51 y.o. Female)     Date of Birth Social Security Number Address Home Phone MRN    1965  [address]  [facility], [place], [facility] [address] [phone] [id number]    Christianity Marital Status          [employer] [facility]        Admission Date Admission Type Admitting Provider Attending Provider Department, Room/Bed    17 Emergency  Cristela Mcarthur DO [address], 3311/1S    Discharge Date Discharge Disposition Discharge Destination                      Attending Provider: Cristela Mcarthur DO     Allergies:  Lorcet [Hydrocodone-acetaminophen]    Isolation:  None   Infection:  None   Code Status:  FULL    Ht:  62\" (157.5 cm)   Wt:  261 lb 14.4 oz (119 kg)    Admission Cmt:  None   Principal Problem:  None                Active Insurance as of 2017     Primary Coverage     Payor Plan Insurance Group Employer/Plan Group    [id number]     Payor Plan Address Payor Plan Phone Number Effective From Effective To    [address]  [address]     [place] [address]       Subscriber Name Subscriber Birth Date Member ID       DEMETRI LAMAR 1962 [id number]                 Emergency Contacts      (Rel.) Home Phone Work Phone Mobile Phone    Demetri Lamar (Spouse) [phone] -- [phone]               History & Physical      Cristela Mcarthur DO at 2017 10:51 AM               [facility] HOSPITALIST HISTORY AND PHYSICAL    Patient Identification:  Name:  Ondina Lamar  Age:  51 y.o.  Sex:  female  :  1965  MRN:  [id number]   Visit Number:  [id number]  Primary Care Physician:  Jazzmine Zapata PA-C     Chief complaint:   Chief Complaint   Patient presents with   • Rapid Heart Rate     History of presenting illness:   Patient is a 51 y.o. female with past medical history significant for asthma, essential hypertension, hyperlipidemia, GERD, and morbid obesity " that presented to the [facility] emergency department for evaluation of palpitations. Patient states that for the past week she has been experiencing intermittent palpitations. She reports that her baseline heart rate has also remained high during this time. She states that the highest she noted her heart rate to be was 160 bpm. Patient states that her palpitations has increased over the past two days. She also reports increased blood pressure over the past week. Patient states that she also has had non-radiating chest pain and pressure located on the left anterior chest. She describes the pain as sharp, waxing and waning. She denies history of myocardial infarction. Patient also reports low grade fever, diaphoresis, and heat intolerance since time of symptom onset. She reports 12 pound unintentional weight loss in the past week. She denies any shortness of breath. She denies any abdominal pain, nausea, vomiting, or diarrhea. She denies any urinary symptoms.     Patient states that she does have a remote history of atrial fibrillation. She states that during pre-op work up for gastric sleeve two years ago she was noted to have afib. She was seen by cardiologist Dr. Jesus in [place], [place]. She states that she is unsure of the details, but knows that it was short lived and she was not put on any anticoagulants. She is prescribed Propanolol by her cardiologist for tachycardia and palpitations.     Patient also reports close follow up with endocrinologist in [place], [place]. She states that she has a multinodular goiter. She states she has underwent multiple thyroid nodule biopsies, with no atypical findings. She states that typically she runs borderline hypothyroid, but never to the point that the endocrinologist wants to start her on medication. She states that she was recently evaluated by endocrinologist in April 2017 with labs, and states that her thyroid labs at that time were WNL. She reports family  history of hypo/hyperthyroidism, her sister has had thyroidectomy.     My evaluation, the patient is complaining of severe nausea and pain in her chest.  She complains of belching.  She also complains of intermittent fever.  She denies cough.  She denies dysuria.  She does complain of some urinary frequency.    Upon arrival to the ED, vitals were temperature 98, , RR 20, /83, and oxygen saturation of 100% on room air. CMP with glucose 127, BUN/Cr ratio of 27.8, and calcium 10.1. CBC with WBC 4.32. TSH <0.010 with Free T4 elevated at 3.39. D Dimer negative. Urinalysis with small leukocyte esterase and trace bacteria noted. CXR with no evidence of consolidations or effusions. EKG significant for atrial fibrillation with RVR. While in the ED, patient was started on Cardizem drip after a bolus was given. Patient was also given a 10 mg dose of methimazole and 20 mg propanolol.     Patient has been admitted to the telemetry floor for further evaluation and treatment.     Present during visit: BRTITNEY Slater  ---------------------------------------------------------------------------------------------------------------------   Review of Systems   Constitutional: Positive for diaphoresis, fever and unexpected weight change. Negative for chills and fatigue.        12 pound weight loss in one week unintentionally    HENT: Negative for congestion, nosebleeds, postnasal drip, rhinorrhea, sinus pressure, sneezing and sore throat.    Eyes: Negative for discharge and visual disturbance.   Respiratory: Positive for chest tightness. Negative for shortness of breath and wheezing.    Cardiovascular: Positive for chest pain and palpitations. Negative for leg swelling.   Gastrointestinal: Negative for abdominal pain, constipation, diarrhea, nausea and vomiting.   Endocrine: Positive for heat intolerance. Negative for polydipsia, polyphagia and polyuria.   Genitourinary: Negative for dysuria, flank pain, frequency, hematuria and  urgency.   Musculoskeletal: Negative for arthralgias, back pain, gait problem and myalgias.   Skin: Negative for rash and wound.   Allergic/Immunologic: Negative for environmental allergies, food allergies and immunocompromised state.   Neurological: Negative for dizziness, seizures, syncope, weakness, light-headedness and headaches.   Hematological: Negative for adenopathy. Does not bruise/bleed easily.   Psychiatric/Behavioral: Negative for confusion and decreased concentration. The patient is not nervous/anxious.       ---------------------------------------------------------------------------------------------------------------------   Past Medical History:   Diagnosis Date   • Acid reflux    • Allergic    • Asthma    • Atrial fibrillation    • Hyperlipidemia    • Hypertension    • Hyperthyroidism    • Morbid obesity    • Vitamin D deficiency      Past Surgical History:   Procedure Laterality Date   •  SECTION      x2   • FOOT SURGERY     • GASTRIC SLEEVE LAPAROSCOPIC      2 years ago   • HYSTERECTOMY      Complete   • KNEE SURGERY      2 (1 was partial knee replacement)   • NOSE SURGERY       Family History   Problem Relation Age of Onset   • No Known Problems Mother    • No Known Problems Father      Social History     Social History   • Marital status:      Spouse name: N/A   • Number of children: N/A   • Years of education: N/A     Social History Main Topics   • Smoking status: Never Smoker   • Smokeless tobacco: None   • Alcohol use No      Comment: !x yearly   • Drug use: No   • Sexual activity: Not Asked      Comment: Hysterectomy     Other Topics Concern   • None     Social History Narrative     ---------------------------------------------------------------------------------------------------------------------   Allergies:  Lorcet [hydrocodone-acetaminophen]  ---------------------------------------------------------------------------------------------------------------------   Prior to  Admission Medications     Prescriptions Last Dose Informant Patient Reported? Taking?    amitriptyline (ELAVIL) 50 MG tablet   Yes No    Take 50 mg by mouth Every Night.    aspirin 81 MG EC tablet   Yes No    Take 81 mg by mouth Daily.    budesonide (PULMICORT) 0.5 MG/2ML nebulizer solution   No No    Take 2 mL by nebulization Daily.    butalbital-acetaminophen-caffeine (FIORICET) -40 MG per tablet   No No    Take 1-2 tablets by mouth Every 6 (Six) Hours As Needed for headaches.    furosemide (LASIX) 20 MG tablet   Yes No    Take 20 mg by mouth 2 (Two) Times a Day.    hydrochlorothiazide (HYDRODIURIL) 25 MG tablet   Yes No    Take 25 mg by mouth Daily.    losartan (COZAAR) 25 MG tablet   Yes No    Take 25 mg by mouth Daily.    montelukast (SINGULAIR) 10 MG tablet   Yes No    Take 10 mg by mouth Every Night.    Multiple Vitamins-Minerals (MULTIVITAMIN ADULT PO)   Yes No    Take  by mouth.    omeprazole (priLOSEC) 20 MG capsule   Yes No    Take 20 mg by mouth Daily.    pramipexole (MIRAPEX) 1.5 MG tablet   Yes No    Take 1.5 mg by mouth 3 (Three) Times a Day.    pravastatin (PRAVACHOL) 20 MG tablet   Yes No    Take 20 mg by mouth Daily.    predniSONE (DELTASONE) 10 MG tablet   No No    Take as directed per 6 day pred jolene    propranolol (INDERAL) 20 MG tablet   Yes No    Take 20 mg by mouth 2 (Two) Times a Day.    Respiratory Therapy Supplies (NEBULIZER/ADULT MASK) kit   No No    1 kit 3 (Three) Times a Day As Needed (for cough and wheeze). Indications: please forward to home supply store    VITAMIN D, CHOLECALCIFEROL, PO   Yes No    Take  by mouth.        Hospital Scheduled Meds:    enoxaparin 1 mg/kg Subcutaneous Q12H   [START ON 7/18/2017] methIMAzole 10 mg Oral Daily       diltiaZEM 5-15 mg/hr Last Rate: 5 mg/hr (07/17/17 8264)   sodium chloride 125 mL/hr Last Rate: 125 mL/hr (07/17/17 1134)      ---------------------------------------------------------------------------------------------------------------------   Vital Signs:  Temp:  [98 °F (36.7 °C)-98.9 °F (37.2 °C)] 98.9 °F (37.2 °C)  Heart Rate:  [] 86  Resp:  [16-20] 18  BP: ()/(41-87) 135/87  Last 3 weights    07/17/17  0713 07/17/17  1137   Weight: 250 lb (113 kg) 254 lb 4.8 oz (115 kg)     Body mass index is 46.51 kg/(m^2).  SpO2 Readings from Last 3 Encounters:   07/17/17 100%   02/03/17 99%   01/06/17 96%     ---------------------------------------------------------------------------------------------------------------------   Physical Exam:  Constitutional:  Well-developed and well-nourished. Morbidly obese. Mild acute distress.  HENT:  Head: Normocephalic and atraumatic.  Mouth:  Moist mucous membranes.    Eyes:  Conjunctivae and EOM are normal.  Pupils are equal, round, and reactive to light.  No scleral icterus.  Neck:  Neck supple.  No JVD present.  (+) goiter.   Cardiovascular:  Normal rate, irregularly irregular rhythm and normal heart sounds with no murmur.  Pulmonary/Chest:  No respiratory distress, no wheezes, no crackles, with normal breath sounds and good air movement.  Abdominal:  Soft. Obese. Bowel sounds are normal.  No distension and no tenderness.   Musculoskeletal:  No edema, no tenderness, and no deformity.  No red or swollen joints anywhere.    Neurological:  Alert and oriented to person, place, and time.  No cranial nerve deficit.  No tongue deviation.  No facial droop.  No slurred speech.   Skin:  Skin is warm and dry.  No rash noted.  No pallor.   Psychiatric:  Normal mood and affect.  Behavior is normal.  Judgment and thought content normal.   Peripheral vascular:  No edema and strong pulses on all 4 extremities.  Genitourinary: No garcia catheter in place, making urine.   ---------------------------------------------------------------------------------------------------------------------  EKG:         Telemetry:    Atrial fibrillation 80s-90s    I have personally reviewed the EKG/Telemetry strip  ---------------------------------------------------------------------------------------------------------------------     Results from last 7 days  Lab Units 07/17/17  0724   WBC 10*3/mm3 4.32*   HEMOGLOBIN g/dL 12.5   HEMATOCRIT % 37.5   MCV fL 85.4   MCHC g/dL 33.3   PLATELETS 10*3/mm3 326   INR  0.99           Results from last 7 days  Lab Units 07/17/17  0724   SODIUM mmol/L 141   POTASSIUM mmol/L 3.6   CHLORIDE mmol/L 108   CO2 mmol/L 28.1   BUN mg/dL 15   CREATININE mg/dL 0.54   EGFR IF NONAFRICN AM mL/min/1.73 120   CALCIUM mg/dL 10.1*   GLUCOSE mg/dL 127*   ALBUMIN g/dL 4.20   BILIRUBIN mg/dL 0.5   ALK PHOS U/L 70   AST (SGOT) U/L 27   ALT (SGPT) U/L 30   Estimated Creatinine Clearance: 151.4 mL/min (by C-G formula based on Cr of 0.54).  No results found for: AMMONIA          Lab Results   Component Value Date    HGBA1C 5.70 07/17/2017     Lab Results   Component Value Date    TSH <0.010 (L) 07/17/2017    FREET4 3.39 (H) 07/17/2017     No results found for: PREGTESTUR, PREGSERUM, HCG, HCGQUANT  Pain Management Panel     There is no flowsheet data to display.          I have personally reviewed the above laboratory results.   ---------------------------------------------------------------------------------------------------------------------  Imaging Results (last 7 days)     Procedure Component Value Units Date/Time    XR Chest 1 View [[id number]] Collected:  07/17/17 0816     Updated:  07/17/17 0822    Narrative:       XR CHEST 1 VW-  CLINICAL INDICATION: soa  COMPARISON: 02/03/2017   TECHNIQUE: Single frontal view of the chest.  FINDINGS:  There is no focal alveolar infiltrate or effusion.  The cardiac silhouette is normal. The pulmonary vasculature is  unremarkable.  There is no evidence of an acute osseous abnormality.   There are no suspicious-appearing parenchymal soft tissue nodules.    Impression:        No evidence of active or acute cardiopulmonary disease on today's chest  radiograph.         This report was finalized on 7/17/2017 8:17 AM by Dr. Colt Powell MD.        CT Head Without Contrast [[id number]]   Updated:  07/17/17 1047     Thyroid ultrasound 3/12/2015     I have personally reviewed the above radiology results.   ---------------------------------------------------------------------------------------------------------------------  Assessment and Plan:  -New onset atrial fibrillation with RVR; CHADS-VASc score is at least 2 which puts her at high risk for stroke  -Newly diagnosed hyperthyroidism with history of multinodular goiter   -Grade I diastolic dysfunction   -Pre-renal azotemia  -Mild leukopenia   -Mild hyperglycemia without known history of diabetes mellitus   -Essential hypertension, controlled   -Hyperlipidemia   -GERD  -Asthma   -History of vitamin D deficiency    -Rheumatoid arthritis   -Morbid obesity   -Sleep apnea   -Anxiety    Patient has been admitted to the telemetry floor for further evaluation and treatment. Continue Cardizem drip. Continue with Lovenox 1 mg/kg BID for anticoagulation. Cardiology has been consulted and input is much appreciated. Will order ECHO. Will trend cardiac enzymes X 3 to rule out ischemia/infarction. Will order a magnesium level; potassium is borderline; will supplement and try to keep closer to 4.0. Continue gentle IV fluid hydration. Will begin Methimazole for hyperthyroidism. Thyroid ultrasound ordered and pending. Patient will need follow up with endocrinologist in outpatient setting per recommendation of Dr. Aldana in one week. Continue other supportive care.      Hemoglobin A1C ordered to further evaluate hyperglycemia. Will continue home medications once reconciled per pharmacy with holding parameters and as tolerated. Currently holding BP meds as patient is borderline hypotensive.     Will order Zofran and a GI cocktail x1.    Will obtain medical  records from patient's cardiologist. Will repeat labs in the morning and continue to monitor the patient closely on telemetry.     DVT Prophylaxis: Lovenox 1 mg/kg Q12H    The patient is considered to be a high risk patient due to: Atrial fibrillation with RVR, hyperthyroidism, multinodular goiter, morbid obesity    I have discussed the patient's assessment and plan with the patient and BRITTNEY Tenorio.     LEVI Huynh  07/17/17  12:09 PM  ---------------------------------------------------------------------------------------------------------------------   *I have discussed with Sabra Pascal and agree with her assessment. I have evaluated the patient independently and have edited/amended this note to reflect my findings and treatment plan/recommendations.       Electronically signed by Cristela Mcarthur DO at 7/17/2017  7:28 PM           Emergency Department Notes      Leonel Villa at 7/17/2017  7:06 AM          ekg done giving to Dr.Ziolkowsi Leoenl Villa  07/17/17 0726       Electronically signed by Leonel Villa at 7/17/2017  7:26 AM      NHAN Ty at 7/17/2017  7:20 AM     Attestation signed by Raymond Can MD at 7/17/2017  8:53 AM              For this patient encounter, I reviewed the NP or PA documentation, treatment plan, and medical decision making. Raymond Can MD 7/17/2017 8:53 AM                               Subjective   Patient is a 51 y.o. female presenting with palpitations.   History provided by:  Patient   used: No    Palpitations   Palpitations quality:  Irregular  Onset quality:  Gradual  Duration:  1 day  Timing:  Intermittent  Progression:  Waxing and waning  Chronicity:  Recurrent  Context: not anxiety, not appetite suppressants, not caffeine, not dehydration, not illicit drugs, not nicotine and not stimulant use    Relieved by:  Nothing  Worsened by:  Nothing  Ineffective treatments:  Beta blockers and bed  rest  Associated symptoms: leg pain, malaise/fatigue, nausea and shortness of breath    Associated symptoms: no back pain, no chest pain, no chest pressure, no dizziness, no hemoptysis, no lower extremity edema and no vomiting    Risk factors: hx of atrial fibrillation and hx of thyroid disease    Risk factors: no hx of DVT and no hx of PE        Review of Systems   Constitutional: Positive for activity change, fatigue and malaise/fatigue.   HENT: Negative.    Eyes: Negative.    Respiratory: Positive for shortness of breath. Negative for hemoptysis.    Cardiovascular: Positive for palpitations. Negative for chest pain.   Gastrointestinal: Positive for nausea. Negative for vomiting.   Endocrine: Negative.    Genitourinary: Negative.    Musculoskeletal: Negative.  Negative for back pain.   Skin: Negative.    Allergic/Immunologic: Negative.    Neurological: Negative.  Negative for dizziness.   Hematological: Negative.    Psychiatric/Behavioral: Negative.    All other systems reviewed and are negative.      Past Medical History:   Diagnosis Date   • Acid reflux    • Allergic    • Asthma    • Hypertension        Allergies   Allergen Reactions   • Lorcet [Hydrocodone-Acetaminophen] Other (See Comments)     Rash that bleeds         Past Surgical History:   Procedure Laterality Date   •  SECTION      x2   • FOOT SURGERY     • GASTRIC SLEEVE LAPAROSCOPIC      2 years ago   • HYSTERECTOMY      Complete   • KNEE SURGERY      2 (1 was partial knee replacement)   • NOSE SURGERY         Family History   Problem Relation Age of Onset   • No Known Problems Mother    • No Known Problems Father        Social History     Social History   • Marital status:      Spouse name: N/A   • Number of children: N/A   • Years of education: N/A     Social History Main Topics   • Smoking status: Never Smoker   • Smokeless tobacco: None   • Alcohol use No      Comment: !x yearly   • Drug use: No   • Sexual activity: Not Asked       Comment: Hysterectomy     Other Topics Concern   • None     Social History Narrative           Objective   Physical Exam   Constitutional: She is oriented to person, place, and time. She appears well-developed and well-nourished.   HENT:   Head: Normocephalic and atraumatic.   Nose: Nose normal.   Mouth/Throat: Oropharynx is clear and moist.   Eyes: EOM are normal. Pupils are equal, round, and reactive to light.   Neck: Normal range of motion.   Cardiovascular: Intact distal pulses.    Irregularly irregular tachycardic rhythm   Pulmonary/Chest: Effort normal and breath sounds normal.   Abdominal: Soft. Bowel sounds are normal.   Musculoskeletal: Normal range of motion.   Neurological: She is alert and oriented to person, place, and time.   Skin: Skin is warm and dry.   Psychiatric: She has a normal mood and affect. Her behavior is normal. Judgment and thought content normal.   Nursing note and vitals reviewed.      Procedures        ED Course  ED Course   Value Comment By Time   ECG 12 Lead Atrial fibrillation with rapid ventricular response. Rate 149. Nonspecific ST-T wave change. No evidence for acute ischemia. Vera Perry, APRN 07/17 0723    Care transferred BEVERLY Perry, APRN 07/17 0756                  [facility]    Final diagnoses:   None            Vera Perry, APRN  07/17/17 0757       Electronically signed by Raymond Can MD at 7/17/2017  8:53 AM      LEVI Choi at 7/17/2017  8:54 AM     Attestation signed by Raymond Can MD at 7/17/2017 12:21 PM              For this patient encounter, I reviewed the NP or PA documentation, treatment plan, and medical decision making. Raymond Can MD 7/17/2017 12:21 PM                               Subjective   History of Present Illness    Review of Systems    Past Medical History:   Diagnosis Date   • Acid reflux    • Allergic    • Asthma    • Atrial fibrillation    • Hyperlipidemia     • Hypertension    • Hyperthyroidism    • Morbid obesity    • Vitamin D deficiency        Allergies   Allergen Reactions   • Lorcet [Hydrocodone-Acetaminophen] Other (See Comments)     Rash that bleeds         Past Surgical History:   Procedure Laterality Date   •  SECTION      x2   • FOOT SURGERY     • GASTRIC SLEEVE LAPAROSCOPIC      2 years ago   • HYSTERECTOMY      Complete   • KNEE SURGERY      2 (1 was partial knee replacement)   • NOSE SURGERY         Family History   Problem Relation Age of Onset   • No Known Problems Mother    • No Known Problems Father        Social History     Social History   • Marital status:      Spouse name: N/A   • Number of children: N/A   • Years of education: N/A     Social History Main Topics   • Smoking status: Never Smoker   • Smokeless tobacco: None   • Alcohol use No      Comment: !x yearly   • Drug use: No   • Sexual activity: Not Asked      Comment: Hysterectomy     Other Topics Concern   • None     Social History Narrative           Objective   Physical Exam    Procedures        ED Course  ED Course   Value Comment By Time   ECG 12 Lead Atrial fibrillation with rapid ventricular response. Rate 149. Nonspecific ST-T wave change. No evidence for acute ischemia. Vera Perry, APRN  0795    Care transferred BEVERLY Perry, APRN  0756    Took over for Oraciobaljit.  Patient reports that she had a history of A. fib a couple years ago.  She is followed by Dr. Bass in [place].  She also sees an endocrinologist and [place].  She states that she's never been hyperthyroid before though today's TSH is less than 0.01.  She denies any other complaints.  She states that she has had intermittent episodes over the past week for she's felt her heart racing. LEVI Choi  0818    D/w Dr Aldana= Tapazole 10mg qam f/u in office in 1 week. D/w Dr Alberto who feels patient should potentially be admitted. Left message for Dr Burgess  to call back LEVI Choi 07/17 0939    D/w Dr Valadez and Dr Aldana. Orders placed LEVI Choi 07/17 1016                  MDM  Number of Diagnoses or Management Options  Rapid atrial fibrillation: established and worsening  Thyrotoxicosis with thyrotoxic crisis, unspecified thyrotoxicosis type: new and requires workup     Amount and/or Complexity of Data Reviewed  Clinical lab tests: ordered and reviewed  Tests in the radiology section of CPT®:  ordered and reviewed  Tests in the medicine section of CPT®:  ordered and reviewed  Decide to obtain previous medical records or to obtain history from someone other than the patient: yes  Discuss the patient with other providers: yes    Risk of Complications, Morbidity, and/or Mortality  Presenting problems: moderate        Final diagnoses:   Rapid atrial fibrillation   Thyrotoxicosis with thyrotoxic crisis, unspecified thyrotoxicosis type            LEVI Choi  07/17/17 1207       Electronically signed by Raymond Can MD at 7/17/2017 12:21 PM      Lilly Bae RN at 7/17/2017 10:23 AM          Verbal order received from LEVI Polk, to hold administration of Heparin until CT head results are back.     Lilly Bae RN  07/17/17 1023       Electronically signed by Lilly Bae RN at 7/17/2017 10:23 AM        Hospital Medications (all)       Dose Frequency Start End    acetaminophen (TYLENOL) tablet 650 mg 650 mg Every 6 Hours PRN 7/17/2017     Sig - Route: Take 2 tablets by mouth Every 6 (Six) Hours As Needed for Mild Pain (1-3) or Headache. - Oral    Cosign for Ordering: Accepted by Mary Valadez MD on 7/17/2017  5:08 PM    acetaminophen-codeine (TYLENOL #3) 300-30 MG per tablet 1 tablet 1 tablet Once 7/17/2017 7/17/2017    Sig - Route: Take 1 tablet by mouth 1 (One) Time. - Oral    acetaminophen-codeine (TYLENOL #3) 300-30 MG per tablet 1 tablet 1 tablet Every 6 Hours PRN 7/18/2017 7/28/2017     Sig - Route: Take 1 tablet by mouth Every 6 (Six) Hours As Needed (Headache). - Oral    aluminum-magnesium hydroxide-simethicone (MAALOX MAX) 400-400-40 MG/5ML suspension 15 mL 15 mL Once 7/17/2017 7/17/2017    Sig - Route: Take 15 mL by mouth 1 (One) Time. - Oral    amitriptyline (ELAVIL) tablet 100 mg 100 mg Every Evening 7/17/2017     Sig - Route: Take 2 tablets by mouth Every Evening. - Oral    aspirin EC tablet 81 mg 81 mg Daily 7/17/2017     Sig - Route: Take 1 tablet by mouth Daily. - Oral    atorvastatin (LIPITOR) tablet 10 mg 10 mg Nightly 7/17/2017     Sig - Route: Take 1 tablet by mouth Every Night. - Oral    cyclobenzaprine (FLEXERIL) tablet 5 mg 5 mg 3 Times Daily PRN 7/17/2017     Sig - Route: Take 0.5 tablets by mouth 3 (Three) Times a Day As Needed for Muscle Spasms. - Oral    diltiaZEM (CARDIZEM) 100 mg/100 mL (1 mg/mL) NS infusion (ADV) 5 mg/hr Continuous 7/17/2017     Sig - Route: Infuse 5 mg/hr into a venous catheter Continuous. - Intravenous    Cosign for Ordering: Accepted by Edward Bailey MD on 7/18/2017  6:04 AM    diltiazem (CARDIZEM) bolus from bag 1 mg/mL 10 mg 10 mg Once 7/17/2017 7/17/2017    Sig - Route: Infuse 10 mg into a venous catheter 1 (One) Time. - Intravenous    Cosign for Ordering: Accepted by Edward Bailey MD on 7/18/2017  6:04 AM    diltiazem (CARDIZEM) bolus from bag 1 mg/mL 10 mg 10 mg Once 7/17/2017 7/17/2017    Sig - Route: Infuse 10 mg into a venous catheter 1 (One) Time. - Intravenous    enoxaparin (LOVENOX) syringe 110 mg 1 mg/kg × 113 kg Every 12 Hours 7/17/2017     Sig - Route: Inject 1.1 mL under the skin Every 12 (Twelve) Hours. - Subcutaneous    lidocaine viscous (XYLOCAINE) 2 % mouth solution 10 mL 10 mL Once 7/17/2017 7/17/2017    Sig - Route: Apply 10 mL to the mouth or throat 1 (One) Time. - Mouth/Throat    methIMAzole (TAPAZOLE) tablet 10 mg 10 mg Once 7/17/2017 7/17/2017    Sig - Route: Take 1 tablet by mouth 1 (One) Time. - Oral    Cosign  for Ordering: Accepted by Raymond Can MD on 7/17/2017 10:01 AM    methIMAzole (TAPAZOLE) tablet 10 mg 10 mg Every 8 Hours Scheduled 7/17/2017     Sig - Route: Take 1 tablet by mouth Every 8 (Eight) Hours. - Oral    Cosign for Ordering: Accepted by Mary Valadez MD on 7/18/2017 11:08 AM    metoprolol tartrate (LOPRESSOR) tablet 50 mg 50 mg Every 12 Hours Scheduled 7/18/2017     Sig - Route: Take 1 tablet by mouth Every 12 (Twelve) Hours. - Oral    montelukast (SINGULAIR) tablet 10 mg 10 mg Every Morning 7/18/2017     Sig - Route: Take 1 tablet by mouth Every Morning. - Oral    multivitamin (DAILY ARACELI) tablet 1 tablet 1 tablet Daily 7/17/2017     Sig - Route: Take 1 tablet by mouth Daily. - Oral    nitroglycerin (NITROSTAT) SL tablet 0.4 mg 0.4 mg Every 5 Minutes PRN 7/17/2017     Sig - Route: Place 1 tablet under the tongue Every 5 (Five) Minutes As Needed for Chest Pain (if systolic BP greater than 100 mm/Hg.). - Sublingual    pantoprazole (PROTONIX) EC tablet 40 mg 40 mg Every Early Morning 7/18/2017     Sig - Route: Take 1 tablet by mouth Every Morning. - Oral    PB-Hyoscy-Atropine-Scopolamine (DONNATAL) per tablet 32.4 mg 2 tablet Once 7/17/2017 7/17/2017    Sig - Route: Take 2 tablets by mouth 1 (One) Time. - Oral    potassium chloride (K-DUR,KLOR-CON) CR tablet 40 mEq 40 mEq Once 7/17/2017 7/17/2017    Sig - Route: Take 2 tablets by mouth 1 (One) Time. - Oral    potassium chloride (MICRO-K) CR capsule 10 mEq 10 mEq Daily PRN 7/17/2017     Sig - Route: Take 1 capsule by mouth Daily As Needed (potassium). - Oral    pramipexole (MIRAPEX) tablet 0.5 mg 0.5 mg Every Morning 7/18/2017     Sig - Route: Take 4 tablets by mouth Every Morning. - Oral    pramipexole (MIRAPEX) tablet 1 mg 1 mg Nightly 7/17/2017     Sig - Route: Take 1 tablet by mouth Every Night. - Oral    promethazine (PHENERGAN) 12.5 mg in sodium chloride 0.9 % 50 mL 12.5 mg Every 6 Hours PRN 7/18/2017     Sig - Route: Infuse 12.5 mg  "into a venous catheter Every 6 (Six) Hours As Needed for Nausea or Vomiting. - Intravenous    Cosign for Ordering: Required by Cristela Mcarthur DO    propranolol (INDERAL) tablet 20 mg 20 mg Once 7/17/2017 7/17/2017    Sig - Route: Take 2 tablets by mouth 1 (One) Time. - Oral    Cosign for Ordering: Accepted by Raymond Can MD on 7/17/2017 10:24 AM    propranolol (INDERAL) tablet 20 mg 20 mg Every 12 Hours Scheduled 7/17/2017     Sig - Route: Take 1 tablet by mouth Every 12 (Twelve) Hours. - Oral    sodium chloride 0.9 % bolus 500 mL 500 mL Once 7/17/2017 7/17/2017    Sig - Route: Infuse 500 mL into a venous catheter 1 (One) Time. - Intravenous    Cosign for Ordering: Accepted by Edward Bailey MD on 7/18/2017  6:04 AM    sodium chloride 0.9 % flush 1-10 mL 1-10 mL As Needed 7/17/2017     Sig - Route: Infuse 1-10 mL into a venous catheter As Needed for Line Care. - Intravenous    sodium chloride 0.9 % flush 10 mL 10 mL As Needed 7/17/2017     Sig - Route: Infuse 10 mL into a venous catheter As Needed for Line Care. - Intravenous    Cosign for Ordering: Accepted by Edward Bailey MD on 7/18/2017  6:04 AM    Linked Group 1:  \"And\" Linked Group Details        sodium chloride 0.9 % infusion 50 mL/hr Continuous 7/17/2017     Sig - Route: Infuse 50 mL/hr into a venous catheter Continuous. - Intravenous    Cosign for Ordering: Accepted by Edward Bailey MD on 7/18/2017  6:04 AM    cefTRIAXone (ROCEPHIN) 1 g/100 mL 0.9% NS (MBP) (Discontinued) 1 g Every 24 Hours 7/17/2017 7/17/2017    Sig - Route: Infuse 100 mL into a venous catheter Daily. - Intravenous    heparin (porcine) 5000 UNIT/ML injection 2,500 Units (Discontinued) 22.1 Units/kg × 113 kg As Needed 7/17/2017 7/17/2017    Sig - Route: Infuse 0.5 mL into a venous catheter As Needed (Per Heparin Nomogram For PTT 45-55). - Intravenous    Cosign for Ordering: Accepted by Raymond Can MD on 7/17/2017 10:24 AM    heparin " (porcine) 5000 UNIT/ML injection 5,000 Units (Discontinued) 44.2 Units/kg × 113 kg Once 7/17/2017 7/17/2017    Sig - Route: Infuse 1 mL into a venous catheter 1 (One) Time. - Intravenous    Cosign for Ordering: Accepted by Raymond Can MD on 7/17/2017 10:24 AM    heparin (porcine) 5000 UNIT/ML injection 5,000 Units (Discontinued) 44.2 Units/kg × 113 kg As Needed 7/17/2017 7/17/2017    Sig - Route: Infuse 1 mL into a venous catheter As Needed (Per Heparin Nomogram For PTT Less Than 45). - Intravenous    Cosign for Ordering: Accepted by Raymond Can MD on 7/17/2017 10:24 AM    heparin 15189 units/250 mL (100 units/mL) in 0.45 % NaCl infusion (Discontinued) 8.85 Units/kg/hr × 113 kg Titrated 7/17/2017 7/17/2017    Sig - Route: Infuse 1,000 Units/hr into a venous catheter Dose Adjusted By Provider As Needed. - Intravenous    Cosign for Ordering: Accepted by Raymond Can MD on 7/17/2017 10:24 AM    methIMAzole (TAPAZOLE) tablet 10 mg (Discontinued) 10 mg Daily 7/18/2017 7/17/2017    Sig - Route: Take 1 tablet by mouth Daily. - Oral    ondansetron (ZOFRAN) injection 4 mg (Discontinued) 4 mg Every 6 Hours PRN 7/17/2017 7/18/2017    Sig - Route: Infuse 2 mL into a venous catheter Every 6 (Six) Hours As Needed for Nausea or Vomiting. - Intravenous            Lab Results (last 24 hours)     Procedure Component Value Units Date/Time    Myoglobin, Serum [[id number]]  (Normal) Collected:  07/17/17 1721    Specimen:  Blood Updated:  07/17/17 1803     Myoglobin 31.0 ng/mL     CK [[id number]]  (Normal) Collected:  07/17/17 1721    Specimen:  Blood Updated:  07/17/17 1804     Creatine Kinase 32 U/L     CK-MB [[id number]]  (Normal) Collected:  07/17/17 1721    Specimen:  Blood Updated:  07/17/17 1804     CKMB <0.18 ng/mL     Troponin [[id number]]  (Normal) Collected:  07/17/17 1721    Specimen:  Blood Updated:  07/17/17 1804     Troponin I <0.006 ng/mL     Narrative:       Ultra Troponin I Reference  Range:         <=0.039 ng/mL: Negative    0.04-0.779 ng/mL: Indeterminate Range. Suspicious of MI.  Clinical correlation required.       >=0.78  ng/mL: Consistent with myocardial injury.  Clinical correlation required.    CK-MB Index [[id number]] Collected:  07/17/17 1721    Specimen:  Blood Updated:  07/17/17 1804     CK-MB Index -- %       Unable to calculate.       Magnesium [[id number]]  (Normal) Collected:  07/17/17 1920    Specimen:  Blood Updated:  07/17/17 2001     Magnesium 1.7 mg/dL       1+ Hemolysis       CBC Auto Differential [[id number]]  (Abnormal) Collected:  07/18/17 0041    Specimen:  Blood Updated:  07/18/17 0122     WBC 5.15 10*3/mm3      RBC 3.90 (L) 10*6/mm3      Hemoglobin 11.2 (L) g/dL      Hematocrit 34.2 (L) %      MCV 87.7 fL      MCH 28.7 pg      MCHC 32.7 (L) g/dL      RDW 12.0 %      RDW-SD 37.4 fl      MPV 10.4 (H) fL      Platelets 296 10*3/mm3      Neutrophil % 51.7 %      Lymphocyte % 34.0 %      Monocyte % 12.0 (H) %      Eosinophil % 1.9 %      Basophil % 0.4 %      Immature Grans % 0.0 %      Neutrophils, Absolute 2.66 10*3/mm3      Lymphocytes, Absolute 1.75 10*3/mm3      Monocytes, Absolute 0.62 10*3/mm3      Eosinophils, Absolute 0.10 10*3/mm3      Basophils, Absolute 0.02 10*3/mm3      Immature Grans, Absolute 0.00 10*3/mm3     Comprehensive Metabolic Panel [[id number]]  (Abnormal) Collected:  07/18/17 0041    Specimen:  Blood Updated:  07/18/17 0148     Glucose 123 (H) mg/dL      BUN 13 mg/dL      Creatinine 0.58 mg/dL      Sodium 140 mmol/L      Potassium 4.2 mmol/L      Chloride 110 mmol/L      CO2 25.4 mmol/L      Calcium 9.0 mg/dL      Total Protein 6.3 g/dL      Albumin 3.60 g/dL      ALT (SGPT) 26 U/L      AST (SGOT) 24 U/L      Alkaline Phosphatase 58 U/L       Note New Reference Ranges        Total Bilirubin 0.4 mg/dL      eGFR Non African Amer 110 mL/min/1.73      Globulin 2.7 gm/dL      A/G Ratio 1.3 (L) g/dL      BUN/Creatinine Ratio 22.4     Anion Gap 4.6 mmol/L      Osmolality, Calculated [[id number]]  (Normal) Collected:  07/18/17 0041    Specimen:  Blood Updated:  07/18/17 0148     Osmolality Calc 280.9 mOsm/kg     C-reactive Protein [[id number]]  (Normal) Collected:  07/18/17 0041    Specimen:  Blood Updated:  07/18/17 0150     C-Reactive Protein <0.50 mg/dL     CK [[id number]]  (Normal) Collected:  07/18/17 0041    Specimen:  Blood Updated:  07/18/17 0156     Creatine Kinase 27 U/L     CK-MB [[id number]]  (Normal) Collected:  07/18/17 0041    Specimen:  Blood Updated:  07/18/17 0156     CKMB <0.18 ng/mL     Myoglobin, Serum [[id number]]  (Normal) Collected:  07/18/17 0041    Specimen:  Blood Updated:  07/18/17 0156     Myoglobin 35.0 ng/mL     Troponin [[id number]]  (Normal) Collected:  07/18/17 0041    Specimen:  Blood Updated:  07/18/17 0156     Troponin I <0.006 ng/mL     Narrative:       Ultra Troponin I Reference Range:         <=0.039 ng/mL: Negative    0.04-0.779 ng/mL: Indeterminate Range. Suspicious of MI.  Clinical correlation required.       >=0.78  ng/mL: Consistent with myocardial injury.  Clinical correlation required.    CK-MB Index [[id number]] Collected:  07/18/17 0041    Specimen:  Blood Updated:  07/18/17 0156     CK-MB Index -- %       Unable to calculate.       Urine Culture [[id number]] Collected:  07/17/17 0832    Specimen:  Urine from Urine, Clean Catch Updated:  07/18/17 0848     Urine Culture Normal Urogenital Winnie    Phosphorus [[id number]]  (Normal) Collected:  07/18/17 0041    Specimen:  Blood Updated:  07/18/17 0916     Phosphorus 3.9 mg/dL         Imaging Results (last 24 hours)     Procedure Component Value Units Date/Time     Thyroid [[id number]] Collected:  07/17/17 1509     Updated:  07/17/17 1516    Narrative:       EXAMINATION: ULTRASOUND THYROID-      CLINICAL INDICATION: Goiter, abnormal thyroid laboratory studies;  I48.91-Unspecified atrial fibrillation; E05.91-Thyrotoxicosis,  unspecified with thyrotoxic crisis or storm.       COMPARISON:  2015.     Sonographic imaging of the thyroid demonstrates heterogeneity of the  gland. The right lobe has a 2 cm heterogeneous mass and the left lobe a  1.4 cm heterogeneous mass. The appearance is very similar to the  previous exam.       Impression:       Heterogeneous appearance of the thyroid, probable  multinodular goiter.      This report was finalized on 2017 3:14 PM by Dr. Colt Powell MD.              Physician Progress Notes (last 24 hours) (Notes from 2017  1:54 PM through 2017  1:54 PM)      LEVI Huynh at 2017  8:53 AM  Version 1 of 1                                   [facility]     Patient Identification:  Name:  Ondina Hayes  Age:  51 y.o.  Sex:  female  :  1965  MRN:  [id number]  Visit Number:  [id number]  Primary Care Provider:  Jazzmine Zapata PA-C    Length of stay:  1    HPI:  Patient is a 52 yo female admitted on  with heart palpitations     Subjective:    Today, the patient was resting in bed upon my evaluation. She states that she feels much better than time of admission. She states the GI cocktail given yesterday significantly improved her chest pressure and abdominal discomfort. She reports continual nausea and headache. She states she has not felt any palpitations. She reports walking in hallway and states that her HR went to approximately 129. She also reports mild dyspnea on exertion. She denies any other complaints at this time. She denies any further chest pain. She denies any vomiting. She denies any fevers or chills. Denies any urinary symptoms.     Present during exam: Patient's    ----------------------------------------------------------------------------------------------------------------------  Current Hospital Meds:    amitriptyline 100 mg Oral Q PM   aspirin 81 mg Oral Daily   atorvastatin 10 mg Oral Nightly   enoxaparin 1 mg/kg Subcutaneous Q12H   methIMAzole 10 mg Oral Q8H    montelukast 10 mg Oral QAM   multivitamin 1 tablet Oral Daily   pantoprazole 40 mg Oral Q AM   pramipexole 0.5 mg Oral QAM   pramipexole 1 mg Oral Nightly   propranolol 20 mg Oral Q12H       diltiaZEM 5 mg/hr Last Rate: 5 mg/hr (07/18/17 0539)   sodium chloride 50 mL/hr Last Rate: 50 mL/hr (07/17/17 1905)     ----------------------------------------------------------------------------------------------------------------------  Vital Signs:  Temp:  [98 °F (36.7 °C)-98.9 °F (37.2 °C)] 98 °F (36.7 °C)  Heart Rate:  [] 100  Resp:  [16-20] 18  BP: ()/(49-87) 124/72  Last 3 weights    07/17/17  0713 07/17/17  1137 07/18/17  0331   Weight: 250 lb (113 kg) 254 lb 4.8 oz (115 kg) 261 lb 14.4 oz (119 kg)     Body mass index is 47.9 kg/(m^2).    Intake/Output Summary (Last 24 hours) at 07/18/17 0863  Last data filed at 07/18/17 0330   Gross per 24 hour   Intake             1600 ml   Output             1800 ml   Net             -200 ml     Diet Regular; Thin; Cardiac  ----------------------------------------------------------------------------------------------------------------------  Physical exam:  Constitutional: Well-developed and well-nourished. Morbidly obese. No acute distress.   HENT:  Head: Normocephalic and atraumatic.  Mouth: Moist mucous membranes.    Eyes: Conjunctivae and EOM are normal. Pupils are equal, round, and reactive to light. No scleral icterus.  Neck: Neck supple. No JVD present.  (+) goiter.   Cardiovascular: Normal rate, regular rhythym and normal heart sounds with no murmur.  Pulmonary/Chest: No respiratory distress, no wheezes, no crackles, with normal breath sounds and good air movement.  Abdominal: Soft. Obese. Bowel sounds are normal. No distension and no tenderness.   Musculoskeletal: No edema, no tenderness, and no deformity. No red or swollen joints anywhere.   Neurological: Alert and oriented to person, place, and time. No cranial nerve deficit. No tongue deviation. No facial  droop. No slurred speech.   Skin: Skin is warm and dry. No rash noted. No pallor.   Psychiatric: Normal mood and affect. Behavior is normal. Judgment and thought content normal.   Peripheral vascular: No edema and strong pulses on all 4 extremities.  Genitourinary: No garcia catheter in place, making urine. ----------------------------------------------------------------------------------------------------------------------  Tele:  Sinus 80s upon my review  ----------------------------------------------------------------------------------------------------------------------  Lab Results   Component Value Date    CKTOTAL 27 07/18/2017    CKMB <0.18 07/18/2017    CKMBINDEX  07/18/2017      Comment:      Unable to calculate.    TROPONINI <0.006 07/18/2017       Results from last 7 days  Lab Units 07/18/17  0041 07/17/17  1202 07/17/17  0724   CRP mg/dL <0.50  --  0.66   WBC 10*3/mm3 5.15 4.35* 4.32*   HEMOGLOBIN g/dL 11.2* 11.8* 12.5   HEMATOCRIT % 34.2* 36.0* 37.5   MCV fL 87.7 86.5 85.4   MCHC g/dL 32.7* 32.8* 33.3   PLATELETS 10*3/mm3 296 299 326   INR   --  1.03 0.99           Results from last 7 days  Lab Units 07/18/17  0041 07/17/17  1920 07/17/17  0724   SODIUM mmol/L 140  --  141   POTASSIUM mmol/L 4.2  --  3.6   MAGNESIUM mg/dL  --  1.7  --    CHLORIDE mmol/L 110  --  108   CO2 mmol/L 25.4  --  28.1   BUN mg/dL 13  --  15   CREATININE mg/dL 0.58  --  0.54   EGFR IF NONAFRICN AM mL/min/1.73 110  --  120   CALCIUM mg/dL 9.0  --  10.1*   GLUCOSE mg/dL 123*  --  127*   ALBUMIN g/dL 3.60  --  4.20   BILIRUBIN mg/dL 0.4  --  0.5   ALK PHOS U/L 58  --  70   AST (SGOT) U/L 24  --  27   ALT (SGPT) U/L 26  --  30   Estimated Creatinine Clearance: 143.9 mL/min (by C-G formula based on Cr of 0.58).  No results found for: AMMONIA         Urine Culture   Date Value Ref Range Status   07/17/2017 Normal Urogenital Winnie  Preliminary      I have personally reviewed the above laboratory results.    ----------------------------------------------------------------------------------------------------------------------  Imaging Results (last 24 hours)     Procedure Component Value Units Date/Time    CT Head Without Contrast [[id number]] Collected:  07/17/17 1052     Updated:  07/17/17 1055    Narrative:       CT HEAD WO CONTRAST-  CLINICAL INDICATION: headache  COMPARISON: 02/03/2017   FINDINGS:   Today's study shows no mass, hemorrhage, or midline shift.   The ventricles, cisterns, and sulci are unremarkable. There is no  hydrocephalus.   There is no evidence of acute ischemia.  I do not see epidural or subdural hematoma.  The gray-white differentiation is appropriate.   The bone window setting images show no destructive calvarial lesion or  acute calvarial fracture.   The posterior fossa is unremarkable.    Impression:       No acute intracranial pathology. Nothing is seen on this exam to  specifically account for the patient's symptoms.     This report was finalized on 7/17/2017 10:53 AM by Dr. Colt Powell MD.    US Thyroid [[id number]] Collected:  07/17/17 1509     Updated:  07/17/17 1516    Narrative:       EXAMINATION: ULTRASOUND THYROID-      COMPARISON: 03/12/2015.  Sonographic imaging of the thyroid demonstrates heterogeneity of the  gland. The right lobe has a 2 cm heterogeneous mass and the left lobe a  1.4 cm heterogeneous mass. The appearance is very similar to the  previous exam.    Impression:       Heterogeneous appearance of the thyroid, probable  multinodular goiter.      This report was finalized on 7/17/2017 3:14 PM by Dr. Colt Powell MD.      I have personally reviewed the above radiology reports.   ----------------------------------------------------------------------------------------------------------------------  Assessment and Plan:  -New onset atrial fibrillation with RVR; CHADS-VASc score is at least 2 which puts her at high risk for stroke  -Newly diagnosed hyperthyroidism with  history of multinodular goiter   -Grade I diastolic dysfunction   -Pre-renal azotemia  -Mild leukopenia   -Normocytic anemia   -Mild hyperglycemia without known history of diabetes mellitus   -Essential hypertension, controlled   -Hyperlipidemia   -GERD  -Asthma   -History of vitamin D deficiency   -Rheumatoid arthritis   -Morbid obesity   -Sleep apnea   -Anxiety  -Prolonged QTc interval 472 m/s     Continue Cardizem drip at 5 mg/hr. Repeat EKG this morning with normal sinus rhythm. Cardiology consulted and input is much appreciated. ECHO pending. Continue Lovenox for anticoagulation for stroke prevention. Cardiac enzymes have remained WNL. Continue gentle IV fluid hydration. Continue to monitor electrolytes and replace per protocol if necessary. Continue Methimazole 10 mg TID. Continue supportive care. QTc prolonged, avoid QTc prolonging agents. I have switched PRN Zofran to phenergan. I have added Tylenol #3 for headache PRN. Continue to hold home BP medications. Will repeat labs in the morning and continue to monitor the patient closely on telemetry.       DVT Prophylaxis: Lovenox 1 mg/kg BID    The patient is high risk due to the following diagnoses/reasons:  Atrial fibrillation with RVR, hyperthyroidism, multinodular goiter, morbid obesity    I have discussed the patient's assessment and plan with the patient and the patient's      LEVI Huynh  07/18/17  8:53 AM  ----------------------------------------------------------------------------------------------------------------------       Electronically signed by LEVI Huynh at 7/18/2017  9:42 AM           Consult Notes (last 24 hours) (Notes from 7/17/2017  1:54 PM through 7/18/2017  1:54 PM)      Sukh Aldana MD at 7/18/2017 11:17 AM  Version 2 of 2         Date of Admit: 7/17/2017  Date of Consult: 07/18/17  No ref. provider found      Active Problems:    Rapid atrial fibrillation        Assessment:    1. New onset atrial  fibrillation with rapid ventricular response.  Her chadsvasc score is 2 female gender and hypertension.  2. Long-standing hypertension (labile) with possible hypertensive heart disease as evidenced by left foot hypertrophy and atrial fibrillation.  3. Chest pains with some typical and some atypical features for CCS class III angina.  4. Strongly positive family history of premature coronary artery disease with her mother reportedly having had heart attack at age 42.  5. Multinodular goiter with hyperthyroidism being treated with methimazole  6. Dyslipidemia, on pravastatin at home  7. GERD  8. Rheumatoid arthritis  9. Morbid obesity , status post gastric sleeve surgery about 2 years ago.  10. History of asthma      Recommendations:    1. For her atrial fibrillation, since patient is a converted back to sinus rhythm and since this is most probably  related to her hyperthyroidism, we'll treat her with a cardioselective beta blocker (as patient has history of asthma) and start her on a chronic oral anticoagulation with one of the newer oral anticoagulants, which are his affordable for her.  (Will have the pharmacy to check on the cost of these agents for her)  2. With regards to her chest pains, since she has a strongly positive family history of premature coronary artery disease (her mother reportedly  from heart attack at 42), we will evaluate further with Lexiscan sestamibi study.  3. I've instructed her to cut back on consumption of caffeinated beverages (patient drinks 2 cups of coffee and 4 cans of caffeinated soda daily)    Reason for consultation: New onset atrial fibrillation and chest pains.    Subjective     History of Present Illness: Ms. Hayes is a pleasant 51-year-old  female with history of long-standing hypertension which apparently is labile, presented with complaints of palpitations that she's been having on and off for the last couple of years.  She initially started to have  palpitations more than 2 years ago and was diagnosed to have atrial fibrillation at that time.  These episodes were short-lived and usually reverted back to sinus rhythm spontaneously.  She subsequently underwent a gastric sleeve surgery for obesity and lost weight and then felt better with no further significant palpitations or episodes of atrial fibrillation.  She has subsequently gained some weight more recently and started to have recurrent palpitations over the last few months.  Her blood pressures also been fluctuating with the uncontrolled blood pressures at times as high as 230 systolic and 110  Diastolic.  When she has the palpitations with rapid heart beat, she starts to have chest pain and discomfort which she describes as sharp to dull pains and tightness associated with shortness of breath.  These are off of moderate intensity (6/10).  There are nonradiating.  When the heart rate slows down she tends to feel better.  Since admission she's been started on IV diltiazem and she has converted back to sinus rhythm.    Mr. Hayes has history of goiter which is being evaluated by the endocrinologist.  Her recent evaluation apparently revealed normal thyroid function tests.  However at presentation to the ED, her TSH was very low suggestive of possible hyperthyroidism.  On further questioning Mrs. Hayes states that she has been having episodes of sweating and tremulousness but the next minute she would feel cold and she will go through these phases periodically.  Since admission she has been started on methimazole.  She has a family history of thyroid goiter with her sister having had this similar problem and underwent thyroidectomy.  She apparently also had atrial fibrillation.  Her initial EKG revealed atrial fibrillation with rapid ventricular rate up to 160 bpm but she was converted back to sinus rhythm and is currently running in the 70s to 80s.    Mrs. Hayes admits to drinking 2 cups of coffee and 4  cans of caffeinated beverages daily.  She is a nonsmoker.  She has no previous history of known coronary artery disease.      Past Medical History:   Diagnosis Date   • Acid reflux    • Allergic    • Anxiety    • Asthma    • Atrial fibrillation    • Diastolic dysfunction     Grade I   • Gout    • Hyperlipidemia    • Hypertension    • Hyperthyroidism    • Morbid obesity    • RA (rheumatoid arthritis)    • Sleep apnea    • Vitamin D deficiency      Past Surgical History:   Procedure Laterality Date   •  SECTION      x2   • FOOT SURGERY     • GASTRIC SLEEVE LAPAROSCOPIC      2 years ago   • HYSTERECTOMY      Complete   • KNEE SURGERY      2 (1 was partial knee replacement)   • NOSE SURGERY       Family History   Problem Relation Age of Onset   • No Known Problems Mother    • No Known Problems Father      Social History   Substance Use Topics   • Smoking status: Never Smoker   • Smokeless tobacco: None   • Alcohol use No      Comment: !x yearly     Prescriptions Prior to Admission   Medication Sig Dispense Refill Last Dose   • albuterol (PROVENTIL HFA;VENTOLIN HFA) 108 (90 BASE) MCG/ACT inhaler Inhale 2 puffs As Needed for Wheezing.   2017 at AM   • amitriptyline (ELAVIL) 100 MG tablet Take 100 mg by mouth Every Evening.   2017 at PM   • aspirin 81 MG EC tablet Take 81 mg by mouth Daily. Patient states that she will sometimes take 2 if she needs it. Took two on 17 at PM   • cyclobenzaprine (FLEXERIL) 5 MG tablet Take 5 mg by mouth 3 (Three) Times a Day As Needed for Muscle Spasms.   7/15/2017 at Unknown   • losartan (COZAAR) 50 MG tablet Take 50 mg by mouth Daily.   2017 at Unknown time   • montelukast (SINGULAIR) 10 MG tablet Take 10 mg by mouth Every Morning.   2017 at AM   • multivitamin (DAILY ARACELI) tablet tablet Take 1 tablet by mouth Daily.   7/15/2017 at Unknown   • potassium chloride (MICRO-K) 10 MEQ CR capsule Take 10 mEq by mouth Daily As Needed.   2017 at PM    • pramipexole (MIRAPEX) 0.5 MG tablet Take 0.5 mg by mouth Every Morning. Doctors office states to take 1 mg twice daily, patient states that she takes 0.5 mg in the morning and 1 mg at night.   7/16/2017 at Unknown time   • pramipexole (MIRAPEX) 1 MG tablet Take 1 mg by mouth Every Night. Doctors office states to take 1 mg twice daily, patient states that she takes 0.5 mg in the morning and 1 mg at night.   7/16/2017 at Unknown time   • pravastatin (PRAVACHOL) 10 MG tablet Take 10 mg by mouth Every Night.   7/15/2017 at PM   • propranolol (INDERAL) 20 MG tablet Take 20 mg by mouth 2 (Two) Times a Day. Patient states that doctor told her to take more if she needed to. Took more than prescribed on 07/16/17.   7/16/2017 at PM   • ranitidine (ZANTAC) 150 MG capsule Take 300 mg by mouth Every Night.   7/16/2017 at PM   • hydrochlorothiazide (HYDRODIURIL) 25 MG tablet Take 25 mg by mouth Daily. Patient states that she will not take if she has had to take the Lasix.   7/15/2017 at Unknown   • omeprazole (priLOSEC) 20 MG capsule Take 20 mg by mouth Daily.   Unknown at Unknown time     Allergies:  Lorcet [hydrocodone-acetaminophen]    Review of Systems   Constitutional: Positive for fatigue. Negative for appetite change, chills and fever.   HENT: Negative for congestion, ear discharge, ear pain and sore throat.    Eyes: Negative for pain and redness.   Respiratory: Positive for shortness of breath. Negative for cough and wheezing.    Cardiovascular: Positive for chest pain and palpitations. Negative for leg swelling.   Gastrointestinal: Negative for abdominal pain, diarrhea, nausea and vomiting.   Endocrine: Positive for cold intolerance and heat intolerance. Negative for polydipsia and polyuria.   Genitourinary: Negative for dysuria and hematuria.   Skin: Negative for rash.   Neurological: Negative for seizures, syncope and headaches.   Psychiatric/Behavioral: Negative for confusion. The patient is not nervous/anxious.           Objective      Vital Signs  Temp:  [97.7 °F (36.5 °C)-98.9 °F (37.2 °C)] 97.7 °F (36.5 °C)  Heart Rate:  [] 79  Resp:  [18-20] 20  BP: (106-135)/(49-87) 107/62  Vital Signs (last 72 hrs)       07/15 0700  -  07/16 0659 07/16 0700  -  07/17 0659 07/17 0700  -  07/18 0659 07/18 0700  -  07/18 1117   Most Recent    Temp (°F)     98 -  98.9      97.7     97.7 (36.5)    Heart Rate     73 -  (!)138      79     79    Resp     16 -  20      20     20    BP     95/54 -  135/87      107/62     107/62    SpO2 (%)     96 -  100    98 -  99     98        Body mass index is 47.9 kg/(m^2).    Intake/Output Summary (Last 24 hours) at 07/18/17 1117  Last data filed at 07/18/17 0331   Gross per 24 hour   Intake             1600 ml   Output             1800 ml   Net             -200 ml     Physical Exam   Constitutional: She appears well-developed and well-nourished.   Pleasant  female who is moderately obese, alert, oriented ×3 and is in no apparent distress at this time.   HENT:   Head: Normocephalic and atraumatic.   Eyes: EOM are normal. No scleral icterus.   Neck: No JVD present. No tracheal deviation present. No thyromegaly present.   Cardiovascular: Normal rate, regular rhythm and normal heart sounds.  Exam reveals no gallop and no friction rub.    No murmur heard.  Pulses:       Carotid pulses are 2+ on the right side, and 2+ on the left side.       Radial pulses are 2+ on the right side, and 2+ on the left side.        Femoral pulses are 2+ on the right side, and 2+ on the left side.       Popliteal pulses are 2+ on the right side, and 2+ on the left side.        Dorsalis pedis pulses are 2+ on the right side, and 2+ on the left side.        Posterior tibial pulses are 2+ on the right side, and 2+ on the left side.   Pulmonary/Chest: No respiratory distress. She has no wheezes. She has no rales.   Abdominal: She exhibits no distension and no mass. There is no tenderness. There is no guarding.    Musculoskeletal: She exhibits no edema.   Neurological: She is alert. No cranial nerve deficit.   Skin: Skin is warm and dry.   Psychiatric: She has a normal mood and affect.         Results Review:    I reviewed the patient's new clinical results.    Results from last 7 days  Lab Units 07/18/17  0041 07/17/17  1721 07/17/17  1202   CK TOTAL U/L 27 32 29   TROPONIN I ng/mL <0.006 <0.006 <0.006   CKMB ng/mL <0.18 <0.18 <0.18   MYOGLOBIN ng/mL 35.0 31.0 28.0       Results from last 7 days  Lab Units 07/18/17  0041 07/17/17  1202 07/17/17  0724   WBC 10*3/mm3 5.15 4.35* 4.32*   HEMOGLOBIN g/dL 11.2* 11.8* 12.5   PLATELETS 10*3/mm3 296 299 326       Results from last 7 days  Lab Units 07/18/17  0041 07/17/17  0724   SODIUM mmol/L 140 141   POTASSIUM mmol/L 4.2 3.6   CHLORIDE mmol/L 110 108   CO2 mmol/L 25.4 28.1   BUN mg/dL 13 15   CREATININE mg/dL 0.58 0.54   CALCIUM mg/dL 9.0 10.1*   GLUCOSE mg/dL 123* 127*   ALT (SGPT) U/L 26 30   AST (SGOT) U/L 24 27     Lab Results   Component Value Date    INR 1.03 07/17/2017    INR 0.99 07/17/2017     Lab Results   Component Value Date    MG 1.7 07/17/2017     Lab Results   Component Value Date    TSH <0.010 (L) 07/17/2017    CHLPL 164 03/10/2015    TRIG 82 03/10/2015    HDL 49 (L) 03/10/2015    LDL 99 03/10/2015      No results found for: BNP    ECG       Test Reason : chest pain  Blood Pressure : **/** mmHG  Vent. Rate : 099 BPM     Atrial Rate : 166 BPM     P-R Int : 000 ms          QRS Dur : 076 ms      QT Int : 360 ms       P-R-T Axes : 000 -01 009 degrees     QTc Int : 462 ms    Atrial fibrillation  Low voltage QRS  Cannot rule out Anterior infarct , age undetermined  Abnormal ECG  When compared with ECG of 17-JUL-2017 07:06,  Vent. rate has decreased BY  50 BPM    Referred By:  SELENA           Confirmed By:     ECG 12 Lead [[id number]]   Collected:  07/18/17 0017     Updated:  07/18/17 1026    Narrative:       Test Reason : possible rhythm change  Blood Pressure : **/**  mmHG  Vent. Rate : 090 BPM     Atrial Rate : 090 BPM     P-R Int : 152 ms          QRS Dur : 080 ms      QT Int : 386 ms       P-R-T Axes : 059 015 036 degrees     QTc Int : 472 ms    Normal sinus rhythm  Low voltage QRS  Cannot rule out Anterior infarct (cited on or before 17-JUL-2017)  Abnormal ECG    Confirmed by Tea Starks ([id number]) on 7/18/2017 10:26:02 AM    Referred By:  SELENA           Confirmed By:Tea Starks          Adult Transthoracic Echo Complete [[id number]] Collected:  07/17/17 1356     Updated:  07/18/17 1116     BSA 2.1 m^2      BH CV ECHO TESFAYE - RVDD 3.1 cm      IVSd 1.1 cm      IVSs 1.4 cm      LVIDd 4.7 cm      LVIDs 3.0 cm      LVPWd 0.87 cm      IVS/LVPW 1.3     FS 36.1 %      EDV(Teich) 101.8 ml      ESV(Teich) 34.9 ml      EF(Teich) 65.7 %      EDV(cubed) 103.1 ml      ESV(cubed) 26.9 ml      EF(cubed) 73.9 %      SI(cubed) 36.0 ml/m^2      Ao root diam 2.9 cm      ACS 2.0 cm      LA dimension 3.2 cm     Narrative:       · Normal left ventricular cavity size noted. All left ventricular wall   segments contract normally. Left ventricular wall thickness is consistent   with mild concentric hypertrophy.  · Estimated EF appears to be in the range of 61 - 65%.  · The aortic valve is structurally normal. No aortic valve regurgitation   is present. No aortic valve stenosis is present.  · The mitral valve is normal in structure. No mitral valve regurgitation   is present. No significant mitral valve stenosis is present.  · The tricuspid valve is normal. No tricuspid valve stenosis is present.   No tricuspid valve regurgitation is present. Estimated right ventricular   systolic pressure from tricuspid regurgitation is normal (<35 mmHg).  · The pulmonic valve is structurally normal. There is no significant   pulmonic valve stenosis present. There is no pulmonic valve regurgitation   present.  · There is no evidence of pericardial effusion.             Imaging Results (last 72 hours)      Procedure Component Value Units Date/Time    XR Chest 1 View [[id number]] Collected:  07/17/17 0816     Updated:  07/17/17 0822    Narrative:       XR CHEST 1 VW-     CLINICAL INDICATION: soa          COMPARISON: 02/03/2017      TECHNIQUE: Single frontal view of the chest.     FINDINGS:     There is no focal alveolar infiltrate or effusion.  The cardiac silhouette is normal. The pulmonary vasculature is  unremarkable.  There is no evidence of an acute osseous abnormality.   There are no suspicious-appearing parenchymal soft tissue nodules.            Impression:       No evidence of active or acute cardiopulmonary disease on today's chest  radiograph.         This report was finalized on 7/17/2017 8:17 AM by Dr. Colt Powell MD.       CT Head Without Contrast [[id number]] Collected:  07/17/17 1052     Updated:  07/17/17 1055    Narrative:       CT HEAD WO CONTRAST-     CLINICAL INDICATION: headache          COMPARISON: 02/03/2017      TECHNIQUE: Axial images of the brain were obtained with out intravenous  contrast.  Reformatted images were created in the sagittal and coronal  planes.     DOSE: 890.74 mGy.cm     Radiation dose reduction techniques were utilized per ALARA protocol.  Automated exposure control was initiated through either or [employer] or  [employer] software packages by  protocol.           FINDINGS:   Today's study shows no mass, hemorrhage, or midline shift.   The ventricles, cisterns, and sulci are unremarkable. There is no  hydrocephalus.   There is no evidence of acute ischemia.  I do not see epidural or subdural hematoma.  The gray-white differentiation is appropriate.   The bone window setting images show no destructive calvarial lesion or  acute calvarial fracture.   The posterior fossa is unremarkable.             Impression:       No acute intracranial pathology. Nothing is seen on this exam to  specifically account for the patient's symptoms.     This report was finalized on  7/17/2017 10:53 AM by Dr. Colt Powell MD.       US Thyroid [[id number]] Collected:  07/17/17 1509     Updated:  07/17/17 1516    Narrative:       EXAMINATION: ULTRASOUND THYROID-      CLINICAL INDICATION: Goiter, abnormal thyroid laboratory studies;  I48.91-Unspecified atrial fibrillation; E05.91-Thyrotoxicosis,  unspecified with thyrotoxic crisis or storm.       COMPARISON: 03/12/2015.     Sonographic imaging of the thyroid demonstrates heterogeneity of the  gland. The right lobe has a 2 cm heterogeneous mass and the left lobe a  1.4 cm heterogeneous mass. The appearance is very similar to the  previous exam.       Impression:       Heterogeneous appearance of the thyroid, probable  multinodular goiter.      This report was finalized on 7/17/2017 3:14 PM by Dr. Colt Powell MD.               Thank you very much for asking us to be involved in this patient's care.  We will follow along with you.      Sukh Aldana MD,[facility]  07/18/17  11:17 AM    Dragon disclaimer:  Much of this encounter note is an electronic transcription/translation of spoken language to printed text. The electronic translation of spoken language may permit erroneous, or at times, nonsensical words or phrases to be inadvertently transcribed; Although I have reviewed the note for such errors, some may still exist.           Electronically signed by Sukh Aldana MD at 7/18/2017 12:05 PM      Sukh Aldana MD at 7/18/2017 11:17 AM  Version 1 of 2         Date of Admit: 7/17/2017  Date of Consult: 07/18/17  No ref. provider found      Active Problems:    Rapid atrial fibrillation        Assessment:    11. New onset atrial fibrillation with rapid ventricular response.  Her chadsvasc score is 2 female gender and hypertension.  12. Long-standing hypertension (labile) with possible hypertensive heart disease as evidenced by left foot hypertrophy and atrial fibrillation.  13. Chest pains with some typical and some atypical features for CCS class  III angina.  14. Strongly positive family history of premature coronary artery disease with her mother reportedly having had heart attack at age 42.  15. Multinodular goiter with hyperthyroidism  16. Dyslipidemia  17. GERD  18. Rheumatoid arthritis  19. Morbid obesity , status post gastric sleeve surgery about 2 years ago.  20. History of asthma      Recommendations:    4. For her atrial fibrillation, since patient is a converted back to sinus rhythm and since this is most probably  related to her hyperthyroidism, we'll treat her with a cardioselective beta blocker (as patient has history of asthma) and start her on a chronic oral anticoagulation with one of the newer oral anticoagulants, which are his affordable for her.  (Will have the pharmacy to check on the cost of these agents for her)  5. With regards to her chest pains, since she has a strongly positive family history of premature coronary artery disease (her mother reportedly  from heart attack at 42), we will evaluate further with Lexiscan sestamibi study.  6. I've instructed her to cut back on consumption of caffeinated beverages (patient drinks 2 cups of coffee and 4 caffeinated soda daily)    Reason for consultation: New onset atrial fibrillation and chest pains.    Subjective     Ondinabjorn Hayes is a 51 y.o. female with problems as listed above presents with    History of Present Illness: Ms. Hayes is a pleasant 51-year-old  female with history of long-standing hypertension which apparently is labile, presented with complaints of palpitations that she's been having on and off for the last couple of years.  She initially started her palpitations more than 2 years ago and was diagnosed to have 8 fibrillation at that time.  She subsequently underwent a gastric sleeve surgery for obesity and lost weight and then felt better with the Lorcet or palpitations.  She is subsequently gained some weight more recently and started to have recurrent  palpitations over the last few months.  Her blood pressures also been fluctuating with the uncontrolled blood pressures that time as high as 230 systolic and 110  Diastolic.  When she has the palpitations with rapid heart beat, she starts to have chest pain and discomfort which she describes as tightness associated with shortness of breath.  His of moderate intensity.  It is nonradiating.  The heart rate slows down and then she tends to feel better.  Since admission she's been started on IV diltiazem and she was converted back to sinus rhythm.    Mr. Hayes has history of goiter which is being evaluated by the endocrinologist.  Her recent evaluation apparently revealed normal thyroid function tests.  However presentation to ED, her TSH was very low suggestive of possible hyperthyroidism.  On further questioning Mrs. Wong states that she has episodes of sweating and tremulousness but the next minute she would feel cold and she will go through these phases periodically.  Since admission she has been started on methimazole.  She has a family history of thyroid goiter with her sister having had this similar problem and underwent thyroidectomy.  She apparently also had atrial fibrillation.  Her initial EKG revealed atrial fibrillation with rapid ventricular rate up to 160 bpm but she was converted back to sinus rhythm and is currently running in the 70s to 80s.    Mrs. Hayes administrated drinking 2 cups of coffee and 4 cans of caffeinated beverages daily.  She is a nonsmoker.      Past Medical History:   Diagnosis Date   • Acid reflux    • Allergic    • Anxiety    • Asthma    • Atrial fibrillation    • Diastolic dysfunction     Grade I   • Gout    • Hyperlipidemia    • Hypertension    • Hyperthyroidism    • Morbid obesity    • RA (rheumatoid arthritis)    • Sleep apnea    • Vitamin D deficiency      Past Surgical History:   Procedure Laterality Date   •  SECTION      x2   • FOOT SURGERY     • GASTRIC SLEEVE  LAPAROSCOPIC      2 years ago   • HYSTERECTOMY      Complete   • KNEE SURGERY      2 (1 was partial knee replacement)   • NOSE SURGERY       Family History   Problem Relation Age of Onset   • No Known Problems Mother    • No Known Problems Father      Social History   Substance Use Topics   • Smoking status: Never Smoker   • Smokeless tobacco: None   • Alcohol use No      Comment: !x yearly     Prescriptions Prior to Admission   Medication Sig Dispense Refill Last Dose   • albuterol (PROVENTIL HFA;VENTOLIN HFA) 108 (90 BASE) MCG/ACT inhaler Inhale 2 puffs As Needed for Wheezing.   7/17/2017 at AM   • amitriptyline (ELAVIL) 100 MG tablet Take 100 mg by mouth Every Evening.   7/16/2017 at PM   • aspirin 81 MG EC tablet Take 81 mg by mouth Daily. Patient states that she will sometimes take 2 if she needs it. Took two on 07/16/17 7/16/2017 at PM   • cyclobenzaprine (FLEXERIL) 5 MG tablet Take 5 mg by mouth 3 (Three) Times a Day As Needed for Muscle Spasms.   7/15/2017 at Unknown   • losartan (COZAAR) 50 MG tablet Take 50 mg by mouth Daily.   7/16/2017 at Unknown time   • montelukast (SINGULAIR) 10 MG tablet Take 10 mg by mouth Every Morning.   7/16/2017 at AM   • multivitamin (DAILY ARACELI) tablet tablet Take 1 tablet by mouth Daily.   7/15/2017 at Unknown   • potassium chloride (MICRO-K) 10 MEQ CR capsule Take 10 mEq by mouth Daily As Needed.   7/16/2017 at PM   • pramipexole (MIRAPEX) 0.5 MG tablet Take 0.5 mg by mouth Every Morning. Doctors office states to take 1 mg twice daily, patient states that she takes 0.5 mg in the morning and 1 mg at night.   7/16/2017 at Unknown time   • pramipexole (MIRAPEX) 1 MG tablet Take 1 mg by mouth Every Night. Doctors office states to take 1 mg twice daily, patient states that she takes 0.5 mg in the morning and 1 mg at night.   7/16/2017 at Unknown time   • pravastatin (PRAVACHOL) 10 MG tablet Take 10 mg by mouth Every Night.   7/15/2017 at PM   • propranolol (INDERAL) 20 MG  tablet Take 20 mg by mouth 2 (Two) Times a Day. Patient states that doctor told her to take more if she needed to. Took more than prescribed on 07/16/17.   7/16/2017 at PM   • ranitidine (ZANTAC) 150 MG capsule Take 300 mg by mouth Every Night.   7/16/2017 at PM   • hydrochlorothiazide (HYDRODIURIL) 25 MG tablet Take 25 mg by mouth Daily. Patient states that she will not take if she has had to take the Lasix.   7/15/2017 at Unknown   • omeprazole (priLOSEC) 20 MG capsule Take 20 mg by mouth Daily.   Unknown at Unknown time     Allergies:  Lorcet [hydrocodone-acetaminophen]    Review of Systems   Constitutional: Positive for fatigue. Negative for appetite change, chills and fever.   HENT: Negative for congestion, ear discharge, ear pain and sore throat.    Eyes: Negative for pain and redness.   Respiratory: Positive for shortness of breath. Negative for cough and wheezing.    Cardiovascular: Positive for chest pain and palpitations. Negative for leg swelling.   Gastrointestinal: Negative for abdominal pain, diarrhea, nausea and vomiting.   Endocrine: Positive for cold intolerance and heat intolerance. Negative for polydipsia and polyuria.   Genitourinary: Negative for dysuria and hematuria.   Skin: Negative for rash.   Neurological: Negative for seizures, syncope and headaches.   Psychiatric/Behavioral: Negative for confusion. The patient is not nervous/anxious.          Objective      Vital Signs  Temp:  [97.7 °F (36.5 °C)-98.9 °F (37.2 °C)] 97.7 °F (36.5 °C)  Heart Rate:  [] 79  Resp:  [18-20] 20  BP: (106-135)/(49-87) 107/62  Vital Signs (last 72 hrs)       07/15 0700  -  07/16 0659 07/16 0700  -  07/17 0659 07/17 0700  -  07/18 0659 07/18 0700  -  07/18 1117   Most Recent    Temp (°F)     98 -  98.9      97.7     97.7 (36.5)    Heart Rate     73 -  (!)138      79     79    Resp     16 -  20      20     20    BP     95/54 -  135/87      107/62     107/62    SpO2 (%)     96 -  100    98 -  99     98         Body mass index is 47.9 kg/(m^2).    Intake/Output Summary (Last 24 hours) at 07/18/17 1117  Last data filed at 07/18/17 0331   Gross per 24 hour   Intake             1600 ml   Output             1800 ml   Net             -200 ml     Physical Exam   Constitutional: She appears well-developed and well-nourished.   Pleasant  female who is moderately obese, alert, oriented ×3 and is in no apparent distress at this time.   HENT:   Head: Normocephalic and atraumatic.   Eyes: EOM are normal. No scleral icterus.   Neck: No JVD present. No tracheal deviation present. No thyromegaly present.   Cardiovascular: Normal rate, regular rhythm and normal heart sounds.  Exam reveals no gallop and no friction rub.    No murmur heard.  Pulses:       Carotid pulses are 2+ on the right side, and 2+ on the left side.       Radial pulses are 2+ on the right side, and 2+ on the left side.        Femoral pulses are 2+ on the right side, and 2+ on the left side.       Popliteal pulses are 2+ on the right side, and 2+ on the left side.        Dorsalis pedis pulses are 2+ on the right side, and 2+ on the left side.        Posterior tibial pulses are 2+ on the right side, and 2+ on the left side.   Pulmonary/Chest: No respiratory distress. She has no wheezes. She has no rales.   Abdominal: She exhibits no distension and no mass. There is no tenderness. There is no guarding.   Musculoskeletal: She exhibits no edema.   Neurological: She is alert. No cranial nerve deficit.   Skin: Skin is warm and dry.   Psychiatric: She has a normal mood and affect.         Results Review:    I reviewed the patient's new clinical results.    Results from last 7 days  Lab Units 07/18/17  0041 07/17/17  1721 07/17/17  1202   CK TOTAL U/L 27 32 29   TROPONIN I ng/mL <0.006 <0.006 <0.006   CKMB ng/mL <0.18 <0.18 <0.18   MYOGLOBIN ng/mL 35.0 31.0 28.0       Results from last 7 days  Lab Units 07/18/17  0041 07/17/17  1202 07/17/17  0724   WBC 10*3/mm3  5.15 4.35* 4.32*   HEMOGLOBIN g/dL 11.2* 11.8* 12.5   PLATELETS 10*3/mm3 296 299 326       Results from last 7 days  Lab Units 07/18/17  0041 07/17/17  0724   SODIUM mmol/L 140 141   POTASSIUM mmol/L 4.2 3.6   CHLORIDE mmol/L 110 108   CO2 mmol/L 25.4 28.1   BUN mg/dL 13 15   CREATININE mg/dL 0.58 0.54   CALCIUM mg/dL 9.0 10.1*   GLUCOSE mg/dL 123* 127*   ALT (SGPT) U/L 26 30   AST (SGOT) U/L 24 27     Lab Results   Component Value Date    INR 1.03 07/17/2017    INR 0.99 07/17/2017     Lab Results   Component Value Date    MG 1.7 07/17/2017     Lab Results   Component Value Date    TSH <0.010 (L) 07/17/2017    CHLPL 164 03/10/2015    TRIG 82 03/10/2015    HDL 49 (L) 03/10/2015    LDL 99 03/10/2015      No results found for: BNP    ECG       Test Reason : chest pain  Blood Pressure : **/** mmHG  Vent. Rate : 099 BPM     Atrial Rate : 166 BPM     P-R Int : 000 ms          QRS Dur : 076 ms      QT Int : 360 ms       P-R-T Axes : 000 -01 009 degrees     QTc Int : 462 ms    Atrial fibrillation  Low voltage QRS  Cannot rule out Anterior infarct , age undetermined  Abnormal ECG  When compared with ECG of 17-JUL-2017 07:06,  Vent. rate has decreased BY  50 BPM    Referred By:  SELENA           Confirmed By:     ECG 12 Lead [[id number]]   Collected:  07/18/17 0017     Updated:  07/18/17 1026    Narrative:       Test Reason : possible rhythm change  Blood Pressure : **/** mmHG  Vent. Rate : 090 BPM     Atrial Rate : 090 BPM     P-R Int : 152 ms          QRS Dur : 080 ms      QT Int : 386 ms       P-R-T Axes : 059 015 036 degrees     QTc Int : 472 ms    Normal sinus rhythm  Low voltage QRS  Cannot rule out Anterior infarct (cited on or before 17-JUL-2017)  Abnormal ECG    Confirmed by eTa Starks ([id number]) on 7/18/2017 10:26:02 AM    Referred By:  SELENA           Confirmed By:Tea Starks          Adult Transthoracic Echo Complete [[id number]] Collected:  07/17/17 1356     Updated:  07/18/17 1116     BSA 2.1 m^2       CV  ECHO TESFAYE - RVDD 3.1 cm      IVSd 1.1 cm      IVSs 1.4 cm      LVIDd 4.7 cm      LVIDs 3.0 cm      LVPWd 0.87 cm      IVS/LVPW 1.3     FS 36.1 %      EDV(Teich) 101.8 ml      ESV(Teich) 34.9 ml      EF(Teich) 65.7 %      EDV(cubed) 103.1 ml      ESV(cubed) 26.9 ml      EF(cubed) 73.9 %      SI(cubed) 36.0 ml/m^2      Ao root diam 2.9 cm      ACS 2.0 cm      LA dimension 3.2 cm     Narrative:       · Normal left ventricular cavity size noted. All left ventricular wall   segments contract normally. Left ventricular wall thickness is consistent   with mild concentric hypertrophy.  · Estimated EF appears to be in the range of 61 - 65%.  · The aortic valve is structurally normal. No aortic valve regurgitation   is present. No aortic valve stenosis is present.  · The mitral valve is normal in structure. No mitral valve regurgitation   is present. No significant mitral valve stenosis is present.  · The tricuspid valve is normal. No tricuspid valve stenosis is present.   No tricuspid valve regurgitation is present. Estimated right ventricular   systolic pressure from tricuspid regurgitation is normal (<35 mmHg).  · The pulmonic valve is structurally normal. There is no significant   pulmonic valve stenosis present. There is no pulmonic valve regurgitation   present.  · There is no evidence of pericardial effusion.             Imaging Results (last 72 hours)     Procedure Component Value Units Date/Time    XR Chest 1 View [[id number]] Collected:  07/17/17 0816     Updated:  07/17/17 0822    Narrative:       XR CHEST 1 VW-     CLINICAL INDICATION: soa          COMPARISON: 02/03/2017      TECHNIQUE: Single frontal view of the chest.     FINDINGS:     There is no focal alveolar infiltrate or effusion.  The cardiac silhouette is normal. The pulmonary vasculature is  unremarkable.  There is no evidence of an acute osseous abnormality.   There are no suspicious-appearing parenchymal soft tissue nodules.            Impression:        No evidence of active or acute cardiopulmonary disease on today's chest  radiograph.         This report was finalized on 7/17/2017 8:17 AM by Dr. Colt Powell MD.       CT Head Without Contrast [[id number]] Collected:  07/17/17 1052     Updated:  07/17/17 1055    Narrative:       CT HEAD WO CONTRAST-     CLINICAL INDICATION: headache          COMPARISON: 02/03/2017      TECHNIQUE: Axial images of the brain were obtained with out intravenous  contrast.  Reformatted images were created in the sagittal and coronal  planes.     DOSE: 890.74 mGy.cm     Radiation dose reduction techniques were utilized per ALARA protocol.  Automated exposure control was initiated through either or Jebbit or  DoseRight software packages by  protocol.           FINDINGS:   Today's study shows no mass, hemorrhage, or midline shift.   The ventricles, cisterns, and sulci are unremarkable. There is no  hydrocephalus.   There is no evidence of acute ischemia.  I do not see epidural or subdural hematoma.  The gray-white differentiation is appropriate.   The bone window setting images show no destructive calvarial lesion or  acute calvarial fracture.   The posterior fossa is unremarkable.             Impression:       No acute intracranial pathology. Nothing is seen on this exam to  specifically account for the patient's symptoms.     This report was finalized on 7/17/2017 10:53 AM by Dr. Colt Powell MD.       US Thyroid [[id number]] Collected:  07/17/17 1509     Updated:  07/17/17 1516    Narrative:       EXAMINATION: ULTRASOUND THYROID-      CLINICAL INDICATION: Goiter, abnormal thyroid laboratory studies;  I48.91-Unspecified atrial fibrillation; E05.91-Thyrotoxicosis,  unspecified with thyrotoxic crisis or storm.       COMPARISON: 03/12/2015.     Sonographic imaging of the thyroid demonstrates heterogeneity of the  gland. The right lobe has a 2 cm heterogeneous mass and the left lobe a  1.4 cm heterogeneous mass. The appearance  is very similar to the  previous exam.       Impression:       Heterogeneous appearance of the thyroid, probable  multinodular goiter.      This report was finalized on 7/17/2017 3:14 PM by Dr. Colt Powell MD.               Thank you very much for asking us to be involved in this patient's care.  We will follow along with you.      Sukh Aldana MD,[facility]  07/18/17  11:17 AM    Dragon disclaimer:  Much of this encounter note is an electronic transcription/translation of spoken language to printed text. The electronic translation of spoken language may permit erroneous, or at times, nonsensical words or phrases to be inadvertently transcribed; Although I have reviewed the note for such errors, some may still exist.           Electronically signed by Sukh Aldana MD at 7/18/2017 11:57 AM

## 2017-07-18 NOTE — PROGRESS NOTES
[facility] HOSPITALIST PROGRESS NOTE     Patient Identification:  Name:  Ondina Hayes  Age:  51 y.o.  Sex:  female  :  1965  MRN:  [id number]  Visit Number:  [id number]  Primary Care Provider:  Jazzmine Zapata PA-C    Length of stay:  1    HPI:  Patient is a 50 yo female admitted on  with heart palpitations     Subjective:    Today, the patient was resting in bed upon my evaluation. She states that she feels much better than time of admission. She states the GI cocktail given yesterday significantly improved her chest pressure and abdominal discomfort. She reports continual nausea and headache. She states she has not felt any palpitations. She reports walking in hallway and states that her HR went to approximately 129. She also reports mild dyspnea on exertion. She denies any other complaints at this time. She denies any further chest pain. She denies any vomiting. She denies any fevers or chills. Denies any urinary symptoms.     Present during exam: Patient's  (BRITTNEY Membreno)  ----------------------------------------------------------------------------------------------------------------------  Current Hospital Meds:    amitriptyline 100 mg Oral Q PM   aspirin 81 mg Oral Daily   atorvastatin 10 mg Oral Nightly   enoxaparin 1 mg/kg Subcutaneous Q12H   methIMAzole 10 mg Oral Q8H   montelukast 10 mg Oral QAM   multivitamin 1 tablet Oral Daily   pantoprazole 40 mg Oral Q AM   pramipexole 0.5 mg Oral QAM   pramipexole 1 mg Oral Nightly   propranolol 20 mg Oral Q12H       diltiaZEM 5 mg/hr Last Rate: 5 mg/hr (17 0539)   sodium chloride 50 mL/hr Last Rate: 50 mL/hr (17 1905)     ----------------------------------------------------------------------------------------------------------------------  Vital Signs:  Temp:  [98 °F (36.7 °C)-98.9 °F (37.2 °C)] 98 °F (36.7 °C)  Heart Rate:  [] 100  Resp:  [16-20] 18  BP: ()/(49-87)  124/72  Last 3 weights    07/17/17  0713 07/17/17  1137 07/18/17  0331   Weight: 250 lb (113 kg) 254 lb 4.8 oz (115 kg) 261 lb 14.4 oz (119 kg)     Body mass index is 47.9 kg/(m^2).    Intake/Output Summary (Last 24 hours) at 07/18/17 0853  Last data filed at 07/18/17 0331   Gross per 24 hour   Intake             1600 ml   Output             1800 ml   Net             -200 ml     Diet Regular; Thin; Cardiac  ----------------------------------------------------------------------------------------------------------------------  Physical exam:  Constitutional: Well-developed and well-nourished. Morbidly obese. No acute distress.   HENT:  Head: Normocephalic and atraumatic.  Mouth: Moist mucous membranes.    Eyes: Conjunctivae and EOM are normal. Pupils are equal, round, and reactive to light. No scleral icterus.  Neck: Neck supple. No JVD present.  (+) goiter.   Cardiovascular: Normal rate, regular rhythym and normal heart sounds with no murmur.  Pulmonary/Chest: No respiratory distress, no wheezes, no crackles, with normal breath sounds and good air movement.  Abdominal: Soft. Obese. Bowel sounds are normal. No distension and no tenderness.   Musculoskeletal: No edema, no tenderness, and no deformity. No red or swollen joints anywhere.   Neurological: Alert and oriented to person, place, and time. No cranial nerve deficit. No tongue deviation. No facial droop. No slurred speech.   Skin: Skin is warm and dry. No rash noted. No pallor.   Psychiatric: Normal mood and affect. Behavior is normal. Judgment and thought content normal.   Peripheral vascular: No edema and strong pulses on all 4 extremities.  Genitourinary: No garcia catheter in place, making urine. ----------------------------------------------------------------------------------------------------------------------  Tele:  Sinus 80s upon my  review  ----------------------------------------------------------------------------------------------------------------------  Lab Results   Component Value Date    CKTOTAL 27 07/18/2017    CKMB <0.18 07/18/2017    CKMBINDEX  07/18/2017      Comment:      Unable to calculate.    TROPONINI <0.006 07/18/2017       Results from last 7 days  Lab Units 07/18/17  0041 07/17/17  1202 07/17/17  0724   CRP mg/dL <0.50  --  0.66   WBC 10*3/mm3 5.15 4.35* 4.32*   HEMOGLOBIN g/dL 11.2* 11.8* 12.5   HEMATOCRIT % 34.2* 36.0* 37.5   MCV fL 87.7 86.5 85.4   MCHC g/dL 32.7* 32.8* 33.3   PLATELETS 10*3/mm3 296 299 326   INR   --  1.03 0.99           Results from last 7 days  Lab Units 07/18/17  0041 07/17/17  1920 07/17/17  0724   SODIUM mmol/L 140  --  141   POTASSIUM mmol/L 4.2  --  3.6   MAGNESIUM mg/dL  --  1.7  --    CHLORIDE mmol/L 110  --  108   CO2 mmol/L 25.4  --  28.1   BUN mg/dL 13  --  15   CREATININE mg/dL 0.58  --  0.54   EGFR IF NONAFRICN AM mL/min/1.73 110  --  120   CALCIUM mg/dL 9.0  --  10.1*   GLUCOSE mg/dL 123*  --  127*   ALBUMIN g/dL 3.60  --  4.20   BILIRUBIN mg/dL 0.4  --  0.5   ALK PHOS U/L 58  --  70   AST (SGOT) U/L 24  --  27   ALT (SGPT) U/L 26  --  30   Estimated Creatinine Clearance: 143.9 mL/min (by C-G formula based on Cr of 0.58).  No results found for: AMMONIA         Urine Culture   Date Value Ref Range Status   07/17/2017 Normal Urogenital Winnie  Preliminary      I have personally reviewed the above laboratory results.   ----------------------------------------------------------------------------------------------------------------------  Imaging Results (last 24 hours)     Procedure Component Value Units Date/Time    CT Head Without Contrast [[id number]] Collected:  07/17/17 1052     Updated:  07/17/17 1055    Narrative:       CT HEAD WO CONTRAST-  CLINICAL INDICATION: headache  COMPARISON: 02/03/2017   FINDINGS:   Today's study shows no mass, hemorrhage, or midline shift.   The ventricles,  cisterns, and sulci are unremarkable. There is no  hydrocephalus.   There is no evidence of acute ischemia.  I do not see epidural or subdural hematoma.  The gray-white differentiation is appropriate.   The bone window setting images show no destructive calvarial lesion or  acute calvarial fracture.   The posterior fossa is unremarkable.    Impression:       No acute intracranial pathology. Nothing is seen on this exam to  specifically account for the patient's symptoms.     This report was finalized on 7/17/2017 10:53 AM by Dr. Colt Powell MD.     Thyroid [[id number]] Collected:  07/17/17 1509     Updated:  07/17/17 1516    Narrative:       EXAMINATION: ULTRASOUND THYROID-      COMPARISON: 03/12/2015.  Sonographic imaging of the thyroid demonstrates heterogeneity of the  gland. The right lobe has a 2 cm heterogeneous mass and the left lobe a  1.4 cm heterogeneous mass. The appearance is very similar to the  previous exam.    Impression:       Heterogeneous appearance of the thyroid, probable  multinodular goiter.      This report was finalized on 7/17/2017 3:14 PM by Dr. Colt Powell MD.      I have personally reviewed the above radiology reports.   ----------------------------------------------------------------------------------------------------------------------  Assessment and Plan:  -New onset atrial fibrillation with RVR; CHADS-VASc score is at least 2 which puts her at high risk for stroke  -Newly diagnosed hyperthyroidism with history of multinodular goiter   -Grade I diastolic dysfunction   -Pre-renal azotemia, improving with IVF hydration  -Mild leukopenia, resolved  -Normocytic anemia   -Mild hyperglycemia without known history of diabetes mellitus   -Essential hypertension, controlled   -Hyperlipidemia   -GERD  -Asthma   -History of vitamin D deficiency   -Rheumatoid arthritis   -Morbid obesity   -Sleep apnea   -Anxiety  -Prolonged QTc interval 472 m/s     Plan to discontinue her Cardizem drip as  she has been started on an oral beta blocker.  Discontinue Lovenox and began her on Xarelto.  I discussed with Dr. Aldana who plans for a Lexiscan tomorrow. Cardiac enzymes have remained WNL. Continue gentle IV fluid hydration.     Continue to monitor electrolytes and replace per protocol if necessary. Continue Methimazole 10 mg TID. Continue supportive care. QTc prolonged, avoid QTc prolonging agents. I have switched PRN Zofran to phenergan. I have added Tylenol #3 for headache PRN. Continue to hold home BP medications for now but consider resuming her Cozaar if blood pressure tolerates.   Will repeat labs in the morning and continue to monitor the patient closely on telemetry.       DVT Prophylaxis: Xarelto    The patient is high risk due to the following diagnoses/reasons:  Atrial fibrillation with RVR, hyperthyroidism, multinodular goiter, morbid obesity    I have discussed the patient's assessment and plan with the patient and the patient's  (and Dr. Jovan Mcarthur)    Disposition:  Hopefully home soon within 1-2 days    LEVI Huynh  07/18/17  8:53 AM  ----------------------------------------------------------------------------------------------------------------------  *I have discussed with LEVI Huynh and agree with her assessment. I have evaluated the patient and edited/amended this note to reflect my findings.

## 2017-07-19 ENCOUNTER — APPOINTMENT (OUTPATIENT)
Dept: NUCLEAR MEDICINE | Facility: HOSPITAL | Age: 52
End: 2017-07-19
Attending: INTERNAL MEDICINE

## 2017-07-19 ENCOUNTER — APPOINTMENT (OUTPATIENT)
Dept: CARDIOLOGY | Facility: HOSPITAL | Age: 52
End: 2017-07-19
Attending: INTERNAL MEDICINE

## 2017-07-19 LAB
ALBUMIN SERPL-MCNC: 3.8 G/DL (ref 3.5–5)
ALBUMIN/GLOB SERPL: 1.4 G/DL (ref 1.5–2.5)
ALP SERPL-CCNC: 54 U/L (ref 35–104)
ALT SERPL W P-5'-P-CCNC: 27 U/L (ref 10–36)
ANION GAP SERPL CALCULATED.3IONS-SCNC: 6.7 MMOL/L (ref 3.6–11.2)
AST SERPL-CCNC: 23 U/L (ref 10–30)
BACTERIA SPEC AEROBE CULT: NORMAL
BASOPHILS # BLD AUTO: 0.02 10*3/MM3 (ref 0–0.3)
BASOPHILS NFR BLD AUTO: 0.4 % (ref 0–2)
BH CV NUCLEAR PRIOR STUDY: 3
BH CV STRESS BP STAGE 1: NORMAL
BH CV STRESS BP STAGE 2: NORMAL
BH CV STRESS COMMENTS STAGE 1: NORMAL
BH CV STRESS COMMENTS STAGE 2: NORMAL
BH CV STRESS DOSE REGADENOSON STAGE 1: 0.4
BH CV STRESS DURATION MIN STAGE 1: 0
BH CV STRESS DURATION MIN STAGE 2: 4
BH CV STRESS DURATION SEC STAGE 1: 15
BH CV STRESS DURATION SEC STAGE 2: 0
BH CV STRESS HR STAGE 1: 106
BH CV STRESS HR STAGE 2: 101
BH CV STRESS PROTOCOL 1: NORMAL
BH CV STRESS RECOVERY BP: NORMAL MMHG
BH CV STRESS RECOVERY HR: 93 BPM
BH CV STRESS STAGE 1: 1
BH CV STRESS STAGE 2: 2
BILIRUB SERPL-MCNC: 0.3 MG/DL (ref 0.2–1.8)
BUN BLD-MCNC: 10 MG/DL (ref 7–21)
BUN/CREAT SERPL: 19.6 (ref 7–25)
CALCIUM SPEC-SCNC: 9.1 MG/DL (ref 7.7–10)
CHLORIDE SERPL-SCNC: 108 MMOL/L (ref 99–112)
CO2 SERPL-SCNC: 25.3 MMOL/L (ref 24.3–31.9)
CREAT BLD-MCNC: 0.51 MG/DL (ref 0.43–1.29)
DEPRECATED RDW RBC AUTO: 38 FL (ref 37–54)
EOSINOPHIL # BLD AUTO: 0.12 10*3/MM3 (ref 0–0.7)
EOSINOPHIL NFR BLD AUTO: 2.7 % (ref 0–5)
ERYTHROCYTE [DISTWIDTH] IN BLOOD BY AUTOMATED COUNT: 12 % (ref 11.5–14.5)
GFR SERPL CREATININE-BSD FRML MDRD: 128 ML/MIN/1.73
GLOBULIN UR ELPH-MCNC: 2.7 GM/DL
GLUCOSE BLD-MCNC: 120 MG/DL (ref 70–110)
HCT VFR BLD AUTO: 34.5 % (ref 37–47)
HGB BLD-MCNC: 11.1 G/DL (ref 12–16)
IMM GRANULOCYTES # BLD: 0 10*3/MM3 (ref 0–0.03)
IMM GRANULOCYTES NFR BLD: 0 % (ref 0–0.5)
LYMPHOCYTES # BLD AUTO: 1.17 10*3/MM3 (ref 1–3)
LYMPHOCYTES NFR BLD AUTO: 26.2 % (ref 21–51)
MAXIMAL PREDICTED HEART RATE: 171 BPM
MCH RBC QN AUTO: 28.7 PG (ref 27–33)
MCHC RBC AUTO-ENTMCNC: 32.2 G/DL (ref 33–37)
MCV RBC AUTO: 89.1 FL (ref 80–94)
MONOCYTES # BLD AUTO: 0.55 10*3/MM3 (ref 0.1–0.9)
MONOCYTES NFR BLD AUTO: 12.3 % (ref 0–10)
NEUTROPHILS # BLD AUTO: 2.61 10*3/MM3 (ref 1.4–6.5)
NEUTROPHILS NFR BLD AUTO: 58.4 % (ref 30–70)
OSMOLALITY SERPL CALC.SUM OF ELEC: 279.6 MOSM/KG (ref 273–305)
PERCENT MAX PREDICTED HR: 61.99 %
PLATELET # BLD AUTO: 279 10*3/MM3 (ref 130–400)
PMV BLD AUTO: 10.6 FL (ref 6–10)
POTASSIUM BLD-SCNC: 4.4 MMOL/L (ref 3.5–5.3)
PROT SERPL-MCNC: 6.5 G/DL (ref 6–8)
RBC # BLD AUTO: 3.87 10*6/MM3 (ref 4.2–5.4)
SODIUM BLD-SCNC: 140 MMOL/L (ref 135–153)
STRESS BASELINE BP: NORMAL MMHG
STRESS BASELINE HR: 86 BPM
STRESS PERCENT HR: 73 %
STRESS POST PEAK BP: NORMAL MMHG
STRESS POST PEAK HR: 106 BPM
STRESS TARGET HR: 145 BPM
WBC NRBC COR # BLD: 4.47 10*3/MM3 (ref 4.5–12.5)

## 2017-07-19 PROCEDURE — 94799 UNLISTED PULMONARY SVC/PX: CPT

## 2017-07-19 PROCEDURE — 85025 COMPLETE CBC W/AUTO DIFF WBC: CPT | Performed by: PHYSICIAN ASSISTANT

## 2017-07-19 PROCEDURE — 93018 CV STRESS TEST I&R ONLY: CPT | Performed by: INTERNAL MEDICINE

## 2017-07-19 PROCEDURE — 78452 HT MUSCLE IMAGE SPECT MULT: CPT | Performed by: INTERNAL MEDICINE

## 2017-07-19 PROCEDURE — 0 TECHNETIUM SESTAMIBI: Performed by: INTERNAL MEDICINE

## 2017-07-19 PROCEDURE — 93017 CV STRESS TEST TRACING ONLY: CPT

## 2017-07-19 PROCEDURE — 80053 COMPREHEN METABOLIC PANEL: CPT | Performed by: PHYSICIAN ASSISTANT

## 2017-07-19 PROCEDURE — 99232 SBSQ HOSP IP/OBS MODERATE 35: CPT | Performed by: INTERNAL MEDICINE

## 2017-07-19 PROCEDURE — 25010000002 AMINOPHYLLINE PER 250 MG: Performed by: INTERNAL MEDICINE

## 2017-07-19 PROCEDURE — 78452 HT MUSCLE IMAGE SPECT MULT: CPT

## 2017-07-19 PROCEDURE — A9500 TC99M SESTAMIBI: HCPCS | Performed by: INTERNAL MEDICINE

## 2017-07-19 PROCEDURE — 25010000002 REGADENOSON 0.4 MG/5ML SOLUTION: Performed by: INTERNAL MEDICINE

## 2017-07-19 RX ORDER — AMINOPHYLLINE DIHYDRATE 25 MG/ML
125 INJECTION, SOLUTION INTRAVENOUS
Status: COMPLETED | OUTPATIENT
Start: 2017-07-19 | End: 2017-07-19

## 2017-07-19 RX ADMIN — ACETAMINOPHEN AND CODEINE PHOSPHATE 1 TABLET: 30; 300 TABLET ORAL at 01:58

## 2017-07-19 RX ADMIN — METHIMAZOLE 10 MG: 10 TABLET ORAL at 15:19

## 2017-07-19 RX ADMIN — ATORVASTATIN CALCIUM 10 MG: 10 TABLET, FILM COATED ORAL at 20:35

## 2017-07-19 RX ADMIN — Medication 1 DOSE: at 11:05

## 2017-07-19 RX ADMIN — Medication 1 TABLET: at 07:40

## 2017-07-19 RX ADMIN — ASPIRIN 81 MG: 81 TABLET ORAL at 07:40

## 2017-07-19 RX ADMIN — RIVAROXABAN 20 MG: 20 TABLET, FILM COATED ORAL at 17:05

## 2017-07-19 RX ADMIN — REGADENOSON 0.4 MG: 0.08 INJECTION, SOLUTION INTRAVENOUS at 13:37

## 2017-07-19 RX ADMIN — PRAMIPEXOLE DIHYDROCHLORIDE 1 MG: 1 TABLET ORAL at 20:35

## 2017-07-19 RX ADMIN — METHIMAZOLE 10 MG: 10 TABLET ORAL at 20:36

## 2017-07-19 RX ADMIN — METOPROLOL TARTRATE 50 MG: 50 TABLET, FILM COATED ORAL at 20:35

## 2017-07-19 RX ADMIN — AMINOPHYLLINE 125 MG: 25 INJECTION, SOLUTION INTRAVENOUS at 13:42

## 2017-07-19 RX ADMIN — AMITRIPTYLINE HYDROCHLORIDE 100 MG: 50 TABLET, FILM COATED ORAL at 20:35

## 2017-07-19 RX ADMIN — ACETAMINOPHEN AND CODEINE PHOSPHATE 1 TABLET: 30; 300 TABLET ORAL at 07:47

## 2017-07-19 RX ADMIN — Medication 1 DOSE: at 14:00

## 2017-07-19 RX ADMIN — ACETAMINOPHEN 650 MG: 325 TABLET ORAL at 10:39

## 2017-07-19 RX ADMIN — METOPROLOL TARTRATE 50 MG: 50 TABLET, FILM COATED ORAL at 07:40

## 2017-07-19 NOTE — PROGRESS NOTES
LOS: 2 days     Name: Ondina Hayes  Age/Sex: 51 y.o. female  :  1965        PCP: Jazzmine Zapata PA-C    Active Problems:    Rapid atrial fibrillation      Admission Information: Ondina Hayes is a 51 y.o. female with a past medical history significant for long-standing labile hypertension and history of short runs of atrial fibrillation about 2 years ago.  She was admitted with complaints of palpitations and chest discomfort.  EKG at admission revealed atrial fibrillation with rapid ventricular response.  She has since then converted into a sinus rhythm and has been maintaining.  She has also been initiated on Xarelto due to her chads VASC score of 2.  It has been found to be available at $0 co-pay.    Following for: Atrial fibrillation.     Telemetry Monitoring: Sinus 80-90s.    Interval history: Patient is doing well this morning. Denies any palpitations. She does have some mild chest discomfort which she states feels more like acid reflux type pains.  She denies any significant bleeding. She has had some scant bleeding from her nose only when she blows it.     Review of Systems   Cardiovascular: Positive for chest pain. Negative for leg swelling, near-syncope, palpitations and syncope.   Respiratory: Negative for shortness of breath.    Gastrointestinal: Positive for heartburn.   Neurological: Negative for dizziness.     Vital Signs  Vital Signs (last 72 hrs)        0700  -   0659  07  -   0659  07  -   0659  07  -   0938   Most Recent    Temp (°F)   98 -  98.9    97.7 -  98.8      97.8     97.8 (36.6)    Heart Rate   73 -  (!)138    79 -  91      94     94    Resp   16 -  20    18 -  20      18     18    BP   95/54 -  135/87    107/62 -  138/69      134/63     134/63    SpO2 (%)   96 -  100    94 -  99      98     98        Temp:  [97.7 °F (36.5 °C)-98.8 °F (37.1 °C)] 97.8 °F (36.6 °C)  Heart Rate:  [79-94] 94  Resp:  [18-20] 18  BP:  (107-138)/(53-79) 134/63  Body mass index is 48.3 kg/(m^2).      Intake/Output Summary (Last 24 hours) at 07/19/17 0938  Last data filed at 07/19/17 0314   Gross per 24 hour   Intake              580 ml   Output              650 ml   Net              -70 ml       Physical Exam   Constitutional: She is oriented to person, place, and time. She appears well-developed and well-nourished. No distress.   HENT:   Head: Normocephalic and atraumatic.   Eyes: Conjunctivae are normal. Right eye exhibits no discharge. Left eye exhibits no discharge.   Neck: Normal range of motion. Neck supple. Carotid bruit is not present.   Cardiovascular: Normal rate, regular rhythm and normal heart sounds.  Exam reveals no gallop and no friction rub.    No murmur heard.  Pulmonary/Chest: Effort normal and breath sounds normal. No respiratory distress. She has no wheezes. She has no rales. She exhibits no tenderness.   Musculoskeletal: Normal range of motion. She exhibits no edema.   Neurological: She is alert and oriented to person, place, and time.   Skin: Skin is warm and dry. No rash noted. She is not diaphoretic. No erythema. No pallor.   Psychiatric: She has a normal mood and affect. Her behavior is normal.   Nursing note and vitals reviewed.    Results Review:       Results from last 7 days  Lab Units 07/19/17  0059 07/18/17  0041 07/17/17  1202 07/17/17  0724   WBC 10*3/mm3 4.47* 5.15 4.35* 4.32*   HEMOGLOBIN g/dL 11.1* 11.2* 11.8* 12.5   PLATELETS 10*3/mm3 279 296 299 326       Results from last 7 days  Lab Units 07/19/17  0059 07/18/17  0041 07/17/17  0724   SODIUM mmol/L 140 140 141   POTASSIUM mmol/L 4.4 4.2 3.6   CHLORIDE mmol/L 108 110 108   CO2 mmol/L 25.3 25.4 28.1   BUN mg/dL 10 13 15   CREATININE mg/dL 0.51 0.58 0.54   CALCIUM mg/dL 9.1 9.0 10.1*   GLUCOSE mg/dL 120* 123* 127*       Results from last 7 days  Lab Units 07/18/17  0041 07/17/17  1721 07/17/17  1202   CK TOTAL U/L 27 32 29   TROPONIN I ng/mL <0.006 <0.006 <0.006    CKMB ng/mL <0.18 <0.18 <0.18   MYOGLOBIN ng/mL 35.0 31.0 28.0       Results from last 7 days  Lab Units 07/17/17  1202 07/17/17  0724   INR  1.03 0.99       Transthoracic echocardiogram 7/17/17  · Normal left ventricular cavity size noted. All left ventricular wall segments contract normally. Left ventricular wall thickness is consistent with mild concentric hypertrophy.  · Estimated EF appears to be in the range of 61 - 65%.  · The aortic valve is structurally normal. No aortic valve regurgitation is present. No aortic valve stenosis is present.  · The mitral valve is normal in structure. No mitral valve regurgitation is present. No significant mitral valve stenosis is present.  · The tricuspid valve is normal. No tricuspid valve stenosis is present. No tricuspid valve regurgitation is present. Estimated right ventricular systolic pressure from tricuspid regurgitation is normal (<35 mmHg).  · The pulmonic valve is structurally normal. There is no significant pulmonic valve stenosis present. There is no pulmonic valve regurgitation present.  · There is no evidence of pericardial effusion.    I reviewed the patient's new clinical results.  I reviewed the patient's new imaging results and agree with the interpretation.  I personally viewed and interpreted the patient's EKG/Telemetry data    Medication Review:     amitriptyline 100 mg Oral Q PM   aspirin 81 mg Oral Daily   atorvastatin 10 mg Oral Nightly   methIMAzole 10 mg Oral Q8H   metoprolol tartrate 50 mg Oral Q12H   montelukast 10 mg Oral QAM   multivitamin 1 tablet Oral Daily   pantoprazole 40 mg Oral Q AM   pramipexole 0.5 mg Oral QAM   pramipexole 1 mg Oral Nightly   rivaroxaban 20 mg Oral Daily With Dinner       sodium chloride 50 mL/hr Last Rate: 50 mL/hr (07/18/17 1536)     Assessment:  1. New onset atrial fibrillation with rapid ventricular response.  Her chadsvasc score is 2 for  female gender and hypertension. She is maintaining a sinus rhythm at this  time.   2. Long-standing hypertension (labile) with possible hypertensive heart disease as evidenced by left ventricular hypertrophy and atrial fibrillation.  3. Chest pains with some typical and some atypical features for CCS class III angina.  4. Strong positive family history of premature coronary artery disease with her mother reportedly having had heart attack at age 42.  5. Multinodular goiter with hyperthyroidism being treated with methimazole  6. Dyslipidemia, on pravastatin at home  7. GERD  8. Rheumatoid arthritis  9. Morbid obesity, status post gastric sleeve surgery about 2 years ago.  10. History of asthma.    Recommendations:  1. Continue aspirin, atorvastatin, metoprolol, and Xarelto (which is available at $0 copay).   2. She is scheduled for stress testing this morning.  We will review the images when they are made available and discuss the results with the patient.        I discussed the patients findings and my recommendations with patient and family      LEVI Banerjee, acting as scribe for Dr. Sukh Aldana MD, [facility].  07/19/17  9:38 AM    Addendum:   I have seen and evaluated Ms. Hayes and discussed with Elizabeth Arteaga who has scribed this note for me.    Sukh Aldana MD, FACC  07/19/17  9:38 AM

## 2017-07-19 NOTE — PROGRESS NOTES
[facility] HOSPITALIST PROGRESS NOTE     Patient Identification:  Name:  Ondina Hayes  Age:  51 y.o.  Sex:  female  :  1965  MRN:  [id number]  Visit Number:  [id number]  Primary Care Provider:  Jazzmine Zapata PA-C    Length of stay:  2    HPI:  Patient is a 52 yo female admitted on  with heart palpitations     Subjective:    Today, the patient was resting in bed upon my evaluation. She complained of some nausea earlier today but this has since resolved. She admits that it occurred shortly after eating a Big Mac sandwich. She reports some mild dyspnea but states that she has just returned from the shower. She denies chest pain at this time. No palpitations. No diarrhea. No abdominal pain. She reports mild lower extremity edema.    Present during exam: Patient's  and BRITTNEY Membreno   ----------------------------------------------------------------------------------------------------------------------  Current [facility] Meds:    amitriptyline 100 mg Oral Q PM   aspirin 81 mg Oral Daily   atorvastatin 10 mg Oral Nightly   methIMAzole 10 mg Oral Q8H   metoprolol tartrate 50 mg Oral Q12H   montelukast 10 mg Oral QAM   multivitamin 1 tablet Oral Daily   pantoprazole 40 mg Oral Q AM   pramipexole 0.5 mg Oral QAM   pramipexole 1 mg Oral Nightly   rivaroxaban 20 mg Oral Daily With Dinner      ----------------------------------------------------------------------------------------------------------------------  Vital Signs:  Temp:  [97.8 °F (36.6 °C)-98.8 °F (37.1 °C)] 98.7 °F (37.1 °C)  Heart Rate:  [91-98] 98  Resp:  [18-20] 20  BP: (134-164)/(63-77) 137/69  Last 3 weights    17  1137 17  0331 17  0314   Weight: 254 lb 4.8 oz (115 kg) 261 lb 14.4 oz (119 kg) 264 lb 1.6 oz (120 kg)     Body mass index is 48.3 kg/(m^2).    Intake/Output Summary (Last 24 hours) at 17 1900  Last data filed at 17 0800   Gross per 24 hour   Intake               220 ml   Output              650 ml   Net             -430 ml     Diet Regular; Thin; Cardiac  ----------------------------------------------------------------------------------------------------------------------  Physical exam:  Constitutional: Well-developed and well-nourished. Morbidly obese. No acute distress.   HENT:  Head: Normocephalic and atraumatic.  Mouth: Moist mucous membranes.    Eyes: Conjunctivae and EOM are normal. Pupils are equal, round, and reactive to light. No scleral icterus.  Neck: Neck supple. No JVD present.  (+) goiter.   Cardiovascular: Normal rate, regular rhythym and normal heart sounds with no murmur.  Pulmonary/Chest: No respiratory distress, no wheezes, no crackles, with normal breath sounds and good air movement.  Abdominal: Soft. Obese. Bowel sounds are normal. No distension and no tenderness.   Musculoskeletal: No edema, no tenderness, and no deformity. No red or swollen joints anywhere.   Neurological: Alert and oriented to person, place, and time. No cranial nerve deficit. No tongue deviation. No facial droop. No slurred speech.   Skin: Skin is warm and dry. No rash noted. No pallor.   Psychiatric: Normal mood and affect. Behavior is normal. Judgment and thought content normal.   Peripheral vascular: No edema and strong pulses on all 4 extremities.  Genitourinary: No garcia catheter in place, making urine. ----------------------------------------------------------------------------------------------------------------------  Tele:  Sinus 70s upon my review; 70s-105 today  ----------------------------------------------------------------------------------------------------------------------  Lab Results   Component Value Date    CKTOTAL 27 07/18/2017    CKMB <0.18 07/18/2017    CKMBINDEX  07/18/2017      Comment:      Unable to calculate.    TROPONINI <0.006 07/18/2017       Results from last 7 days  Lab Units 07/19/17  0059 07/18/17  0041 07/17/17  1202 07/17/17  0724   CRP  mg/dL  --  <0.50  --  0.66   WBC 10*3/mm3 4.47* 5.15 4.35* 4.32*   HEMOGLOBIN g/dL 11.1* 11.2* 11.8* 12.5   HEMATOCRIT % 34.5* 34.2* 36.0* 37.5   MCV fL 89.1 87.7 86.5 85.4   MCHC g/dL 32.2* 32.7* 32.8* 33.3   PLATELETS 10*3/mm3 279 296 299 326   INR   --   --  1.03 0.99           Results from last 7 days  Lab Units 07/19/17  0059 07/18/17  0041 07/17/17  1920 07/17/17  0724   SODIUM mmol/L 140 140  --  141   POTASSIUM mmol/L 4.4 4.2  --  3.6   MAGNESIUM mg/dL  --   --  1.7  --    CHLORIDE mmol/L 108 110  --  108   CO2 mmol/L 25.3 25.4  --  28.1   BUN mg/dL 10 13  --  15   CREATININE mg/dL 0.51 0.58  --  0.54   EGFR IF NONAFRICN AM mL/min/1.73 128 110  --  120   CALCIUM mg/dL 9.1 9.0  --  10.1*   GLUCOSE mg/dL 120* 123*  --  127*   ALBUMIN g/dL 3.80 3.60  --  4.20   BILIRUBIN mg/dL 0.3 0.4  --  0.5   ALK PHOS U/L 54 58  --  70   AST (SGOT) U/L 23 24  --  27   ALT (SGPT) U/L 27 26  --  30   Estimated Creatinine Clearance: 164.5 mL/min (by C-G formula based on Cr of 0.51).  No results found for: AMMONIA         Urine Culture   Date Value Ref Range Status   07/17/2017 Normal Urogenital Winnie  Preliminary      I have personally reviewed the above laboratory results.   ----------------------------------------------------------------------------------------------------------------------  Imaging Results (last 24 hours)     Procedure Component Value Units Date/Time    CT Head Without Contrast [[id number]] Collected:  07/17/17 1052     Updated:  07/17/17 1055    Narrative:       CT HEAD WO CONTRAST-  CLINICAL INDICATION: headache  COMPARISON: 02/03/2017   FINDINGS:   Today's study shows no mass, hemorrhage, or midline shift.   The ventricles, cisterns, and sulci are unremarkable. There is no  hydrocephalus.   There is no evidence of acute ischemia.  I do not see epidural or subdural hematoma.  The gray-white differentiation is appropriate.   The bone window setting images show no destructive calvarial lesion or  acute  calvarial fracture.   The posterior fossa is unremarkable.    Impression:       No acute intracranial pathology. Nothing is seen on this exam to  specifically account for the patient's symptoms.     This report was finalized on 7/17/2017 10:53 AM by Dr. Colt Powell MD.     Thyroid [[id number]] Collected:  07/17/17 1509     Updated:  07/17/17 1516    Narrative:       EXAMINATION: ULTRASOUND THYROID-      COMPARISON: 03/12/2015.  Sonographic imaging of the thyroid demonstrates heterogeneity of the  gland. The right lobe has a 2 cm heterogeneous mass and the left lobe a  1.4 cm heterogeneous mass. The appearance is very similar to the  previous exam.    Impression:       Heterogeneous appearance of the thyroid, probable  multinodular goiter.      This report was finalized on 7/17/2017 3:14 PM by Dr. Colt Powell MD.      I have personally reviewed the above radiology reports.   ----------------------------------------------------------------------------------------------------------------------  Assessment and Plan:  -New onset atrial fibrillation with RVRIn the setting of newly diagnosed hyperthyroidism; CHADS-VASc score is 2  -Chest pain; ruled out for acute myocardial infarction; stress test results pending at this time  -Newly diagnosed hyperthyroidism with history of multinodular goiter   -Grade I diastolic dysfunction   -Pre-renal azotemia, resolved after IV fluid hydration  -Mild leukopenia, resolved  -Normocytic anemia   -Mild hyperglycemia without known history of diabetes mellitus   -Essential hypertension, controlled   -Hyperlipidemia   -GERD  -Rheumatoid arthritis   -Morbid obesity   -Sleep apnea   -Anxiety  -Prolonged QTc interval 472 m/s     Continue with metoprolol tartrate.  She has been started on Xarelto and appears to be tolerating this medication at this time.  We're awaiting her stress test results.  I will discontinue her IV fluids and allow her to self hydrate.  Continue with aspirin (for  now) and atorvastatin.    Continue to monitor electrolytes and replace per protocol if necessary. Continue Methimazole 10 mg TID. Continue supportive care. QTc prolonged, avoid QTc prolonging agents. I have switched PRN Zofran to phenergan. I have added Tylenol #3 for headache PRN. Continue to hold home BP medications for now but consider resuming her Cozaar if blood pressure tolerates.     Will repeat labs in the morning and continue to monitor the patient closely on telemetry.     DVT Prophylaxis: Xarelto  DVT prophylaxis: Pantoprazole    The patient is high risk due to the following diagnoses/reasons:  Atrial fibrillation with RVR, hyperthyroidism, multinodular goiter, morbid obesity    I have discussed the patient's assessment and plan with the patient and the patient's  (and Dr. Jovan Mcarthur)    Disposition:  Hopefully home soon; possibly within 24 hours    Cristela Mcarthur,   07/19/17  7:00 PM

## 2017-07-20 VITALS
TEMPERATURE: 97.8 F | RESPIRATION RATE: 20 BRPM | OXYGEN SATURATION: 97 % | BODY MASS INDEX: 49.18 KG/M2 | HEART RATE: 78 BPM | WEIGHT: 267.25 LBS | HEIGHT: 62 IN | SYSTOLIC BLOOD PRESSURE: 144 MMHG | DIASTOLIC BLOOD PRESSURE: 91 MMHG

## 2017-07-20 LAB
ALBUMIN SERPL-MCNC: 3.7 G/DL (ref 3.5–5)
ALBUMIN/GLOB SERPL: 1.5 G/DL (ref 1.5–2.5)
ALP SERPL-CCNC: 53 U/L (ref 35–104)
ALT SERPL W P-5'-P-CCNC: 28 U/L (ref 10–36)
ANION GAP SERPL CALCULATED.3IONS-SCNC: 4.3 MMOL/L (ref 3.6–11.2)
AST SERPL-CCNC: 30 U/L (ref 10–30)
BASOPHILS # BLD AUTO: 0.01 10*3/MM3 (ref 0–0.3)
BASOPHILS NFR BLD AUTO: 0.2 % (ref 0–2)
BILIRUB SERPL-MCNC: 0.3 MG/DL (ref 0.2–1.8)
BUN BLD-MCNC: 10 MG/DL (ref 7–21)
BUN/CREAT SERPL: 21.7 (ref 7–25)
CALCIUM SPEC-SCNC: 9.1 MG/DL (ref 7.7–10)
CHLORIDE SERPL-SCNC: 108 MMOL/L (ref 99–112)
CO2 SERPL-SCNC: 28.7 MMOL/L (ref 24.3–31.9)
CREAT BLD-MCNC: 0.46 MG/DL (ref 0.43–1.29)
DEPRECATED RDW RBC AUTO: 36.1 FL (ref 37–54)
EOSINOPHIL # BLD AUTO: 0.12 10*3/MM3 (ref 0–0.7)
EOSINOPHIL NFR BLD AUTO: 3 % (ref 0–5)
ERYTHROCYTE [DISTWIDTH] IN BLOOD BY AUTOMATED COUNT: 11.8 % (ref 11.5–14.5)
GFR SERPL CREATININE-BSD FRML MDRD: 144 ML/MIN/1.73
GLOBULIN UR ELPH-MCNC: 2.5 GM/DL
GLUCOSE BLD-MCNC: 98 MG/DL (ref 70–110)
HCT VFR BLD AUTO: 33.3 % (ref 37–47)
HGB BLD-MCNC: 11 G/DL (ref 12–16)
IMM GRANULOCYTES # BLD: 0.01 10*3/MM3 (ref 0–0.03)
IMM GRANULOCYTES NFR BLD: 0.2 % (ref 0–0.5)
LYMPHOCYTES # BLD AUTO: 1.31 10*3/MM3 (ref 1–3)
LYMPHOCYTES NFR BLD AUTO: 32.3 % (ref 21–51)
MCH RBC QN AUTO: 28.9 PG (ref 27–33)
MCHC RBC AUTO-ENTMCNC: 33 G/DL (ref 33–37)
MCV RBC AUTO: 87.4 FL (ref 80–94)
MONOCYTES # BLD AUTO: 0.46 10*3/MM3 (ref 0.1–0.9)
MONOCYTES NFR BLD AUTO: 11.4 % (ref 0–10)
NEUTROPHILS # BLD AUTO: 2.14 10*3/MM3 (ref 1.4–6.5)
NEUTROPHILS NFR BLD AUTO: 52.9 % (ref 30–70)
OSMOLALITY SERPL CALC.SUM OF ELEC: 280.3 MOSM/KG (ref 273–305)
PLATELET # BLD AUTO: 281 10*3/MM3 (ref 130–400)
PMV BLD AUTO: 10.4 FL (ref 6–10)
POTASSIUM BLD-SCNC: 3.8 MMOL/L (ref 3.5–5.3)
PROT SERPL-MCNC: 6.2 G/DL (ref 6–8)
RBC # BLD AUTO: 3.81 10*6/MM3 (ref 4.2–5.4)
SODIUM BLD-SCNC: 141 MMOL/L (ref 135–153)
WBC NRBC COR # BLD: 4.05 10*3/MM3 (ref 4.5–12.5)

## 2017-07-20 PROCEDURE — 99232 SBSQ HOSP IP/OBS MODERATE 35: CPT | Performed by: INTERNAL MEDICINE

## 2017-07-20 PROCEDURE — 63710000001 DIPHENHYDRAMINE PER 50 MG: Performed by: INTERNAL MEDICINE

## 2017-07-20 PROCEDURE — 80053 COMPREHEN METABOLIC PANEL: CPT | Performed by: INTERNAL MEDICINE

## 2017-07-20 PROCEDURE — 94799 UNLISTED PULMONARY SVC/PX: CPT

## 2017-07-20 PROCEDURE — 85025 COMPLETE CBC W/AUTO DIFF WBC: CPT | Performed by: INTERNAL MEDICINE

## 2017-07-20 PROCEDURE — 99239 HOSP IP/OBS DSCHRG MGMT >30: CPT | Performed by: INTERNAL MEDICINE

## 2017-07-20 RX ORDER — METHIMAZOLE 10 MG/1
10 TABLET ORAL EVERY 8 HOURS SCHEDULED
Qty: 90 TABLET | Refills: 0 | Status: SHIPPED | OUTPATIENT
Start: 2017-07-20 | End: 2017-11-16 | Stop reason: HOSPADM

## 2017-07-20 RX ORDER — METOPROLOL TARTRATE 50 MG/1
50 TABLET, FILM COATED ORAL EVERY 12 HOURS SCHEDULED
Qty: 60 TABLET | Refills: 0 | Status: SHIPPED | OUTPATIENT
Start: 2017-07-20 | End: 2017-09-13

## 2017-07-20 RX ORDER — DIPHENHYDRAMINE HCL 25 MG
25 CAPSULE ORAL ONCE
Status: COMPLETED | OUTPATIENT
Start: 2017-07-20 | End: 2017-07-20

## 2017-07-20 RX ADMIN — Medication 1 TABLET: at 08:02

## 2017-07-20 RX ADMIN — DIPHENHYDRAMINE HYDROCHLORIDE 25 MG: 25 CAPSULE ORAL at 04:59

## 2017-07-20 RX ADMIN — MONTELUKAST SODIUM 10 MG: 10 TABLET ORAL at 04:59

## 2017-07-20 RX ADMIN — PANTOPRAZOLE SODIUM 40 MG: 40 TABLET, DELAYED RELEASE ORAL at 04:59

## 2017-07-20 RX ADMIN — METHIMAZOLE 10 MG: 10 TABLET ORAL at 05:00

## 2017-07-20 RX ADMIN — PRAMIPEXOLE DIHYDROCHLORIDE 0.5 MG: 0.12 TABLET ORAL at 04:59

## 2017-07-20 RX ADMIN — METOPROLOL TARTRATE 50 MG: 50 TABLET, FILM COATED ORAL at 08:02

## 2017-07-20 RX ADMIN — ASPIRIN 81 MG: 81 TABLET ORAL at 08:02

## 2017-07-20 NOTE — PAYOR COMM NOTE
"Ondina Lamar (51 y.o. Female)     Date of Birth Social Security Number Address Home Phone MRN    1965  [address]  [facility] [address] [phone] [id number]    Islam Marital Status          [employer]        Admission Date Admission Type Admitting Provider Attending Provider Department, Room/Bed    7/17/17 Emergency   [address], 3311/1S    Discharge Date Discharge Disposition Discharge Destination        7/20/2017 Home or Self Care             Attending Provider: (none)    Allergies:  Lorcet [Hydrocodone-acetaminophen]    Isolation:  None   Infection:  None   Code Status:  FULL    Ht:  62\" (157.5 cm)   Wt:  267 lb 4 oz (121 kg)    Admission Cmt:  None   Principal Problem:  None                Active Insurance as of 7/17/2017     Primary Coverage     Payor Plan Insurance Group Employer/Plan Group    [facility] [place] [id number]     Payor Plan Address Payor Plan Phone Number Effective From Effective To    [address]  5/5[birth year][address]     [place] [address]       Subscriber Name Subscriber Birth Date Member ID       DEMETRI ALMAR 8[birth year]1964 [id number]                 Emergency Contacts      (Rel.) Home Phone Work Phone Mobile Phone    Demetri Lamar (Spouse) [phone] -- [phone]            Discharge Summary     No notes of this type exist for this encounter.        Discharge Order     Start     Ordered    07/20/17 1122  Discharge patient  Once     Expected Discharge Date:  07/20/17    Discharge Disposition:  Home or Self Care        07/20/17 1127          "

## 2017-07-20 NOTE — PROGRESS NOTES
LOS: 3 days     Name: Ondina Hayes  Age/Sex: 51 y.o. female  :  1965        PCP: Jazzmine Zapata PA-C    Active Problems:    Rapid atrial fibrillation      Admission Information: Ondina Hayes is a 51 y.o. female with a past medical history significant for long-standing labile hypertension and history of short runs of atrial fibrillation about 2 years ago.  She was admitted with complaints of palpitations and chest discomfort.  EKG at admission revealed atrial fibrillation with rapid ventricular response.  She has since then converted into a sinus rhythm and has been maintaining.  She has also been initiated on Xarelto due to her chads VASC score of 2.  It has been found to be available at $0 co-pay.     Following for: Atrial fibrillation.    Telemetry Monitoring: Refusing telemetry monitoring due to irritation from the electrodes.     Interval history: She has been doing well last night and this morning.  She had a mild, short episode chest discomfort after breakfast this morning which again she states felt like acid reflux.  At home, she generally takes ranitidine and omeprazole.  She has been getting pantoprazole here at the hospital.  She has been up walking the halls and has not had any chest discomfort with exertion.  She denies any further palpitations or fluttering of her heart.     Review of Systems   Constitution: Negative for weakness and malaise/fatigue.   Cardiovascular: Positive for chest pain. Negative for dyspnea on exertion, irregular heartbeat, leg swelling, near-syncope, palpitations and syncope.   Respiratory: Negative for shortness of breath.    Gastrointestinal: Positive for heartburn.   Neurological: Negative for dizziness.     Vital Signs  Vital Signs (last 72 hrs)       700  -   0659 700  -   0659  07  -   0659 700  -   0931   Most Recent    Temp (°F) 98 -  98.9    97.7 -  98.8    97.3 -  98.7       97.7 (36.5)    Heart Rate  73 -  (!)138    79 -  91    91 -  98       96    Resp 16 -  20    18 -  20    18 -  20       18    BP 95/54 -  135/87    107/62 -  138/69    129/69 -  164/77       129/69    SpO2 (%) 96 -  100    94 -  99    97 -  98      97     97        Temp:  [97.3 °F (36.3 °C)-98.7 °F (37.1 °C)] 97.7 °F (36.5 °C)  Heart Rate:  [91-98] 96  Resp:  [18-20] 18  BP: (129-164)/(69-77) 129/69  Body mass index is 48.88 kg/(m^2).      Intake/Output Summary (Last 24 hours) at 07/20/17 0931  Last data filed at 07/20/17 0839   Gross per 24 hour   Intake              720 ml   Output             2600 ml   Net            -1880 ml     Physical Exam   Constitutional: She is oriented to person, place, and time. She appears well-developed and well-nourished. No distress.   HENT:   Head: Normocephalic and atraumatic.   Eyes: Conjunctivae are normal. Right eye exhibits no discharge. Left eye exhibits no discharge.   Neck: Normal range of motion. Neck supple. Carotid bruit is not present.   Cardiovascular: Normal rate, regular rhythm and normal heart sounds.  Exam reveals no gallop and no friction rub.    No murmur heard.  Pulmonary/Chest: Effort normal and breath sounds normal. No respiratory distress. She has no wheezes. She has no rales. She exhibits no tenderness.   Musculoskeletal: Normal range of motion. She exhibits no edema.   Neurological: She is alert and oriented to person, place, and time.   Skin: Skin is warm and dry. No rash noted. She is not diaphoretic. There is erythema (Mild superficial erythema where electrodes were previously placed on the chest.  No signs of infection.). No pallor.   Psychiatric: She has a normal mood and affect. Her behavior is normal.   Nursing note and vitals reviewed.    Results Review:       Results from last 7 days  Lab Units 07/20/17  0356 07/19/17  0059 07/18/17  0041 07/17/17  1202 07/17/17  0724   WBC 10*3/mm3 4.05* 4.47* 5.15 4.35* 4.32*   HEMOGLOBIN g/dL 11.0* 11.1* 11.2* 11.8* 12.5   PLATELETS  10*3/mm3 281 279 296 299 326       Results from last 7 days  Lab Units 07/20/17  0356 07/19/17  0059 07/18/17  0041 07/17/17  0724   SODIUM mmol/L 141 140 140 141   POTASSIUM mmol/L 3.8 4.4 4.2 3.6   CHLORIDE mmol/L 108 108 110 108   CO2 mmol/L 28.7 25.3 25.4 28.1   BUN mg/dL 10 10 13 15   CREATININE mg/dL 0.46 0.51 0.58 0.54   CALCIUM mg/dL 9.1 9.1 9.0 10.1*   GLUCOSE mg/dL 98 120* 123* 127*       Results from last 7 days  Lab Units 07/18/17  0041 07/17/17  1721 07/17/17  1202   CK TOTAL U/L 27 32 29   TROPONIN I ng/mL <0.006 <0.006 <0.006   CKMB ng/mL <0.18 <0.18 <0.18   MYOGLOBIN ng/mL 35.0 31.0 28.0       Results from last 7 days  Lab Units 07/17/17  1202 07/17/17  0724   INR  1.03 0.99     Nuclear Stress Test 07/19/17  · Findings consistent with a normal ECG stress test.  · Left ventricular ejection fraction is hyperdynamic (Calculated EF > 70%).  · Myocardial perfusion imaging indicates a normal myocardial perfusion study with no evidence of ischemia.  · Impressions are consistent with a low risk study.    Transthoracic echocardiogram 7/17/17  · Normal left ventricular cavity size noted. All left ventricular wall segments contract normally. Left ventricular wall thickness is consistent with mild concentric hypertrophy.  · Estimated EF appears to be in the range of 61 - 65%.  · The aortic valve is structurally normal. No aortic valve regurgitation is present. No aortic valve stenosis is present.  · The mitral valve is normal in structure. No mitral valve regurgitation is present. No significant mitral valve stenosis is present.  · The tricuspid valve is normal. No tricuspid valve stenosis is present. No tricuspid valve regurgitation is present. Estimated right ventricular systolic pressure from tricuspid regurgitation is normal (<35 mmHg).  · The pulmonic valve is structurally normal. There is no significant pulmonic valve stenosis present. There is no pulmonic valve regurgitation present.  · There is no  evidence of pericardial effusion.    I reviewed the patient's new clinical results.  I reviewed the patient's new imaging results and agree with the interpretation.  I personally viewed and interpreted the patient's EKG/Telemetry data    Medication Review:     amitriptyline 100 mg Oral Q PM   aspirin 81 mg Oral Daily   atorvastatin 10 mg Oral Nightly   methIMAzole 10 mg Oral Q8H   metoprolol tartrate 50 mg Oral Q12H   montelukast 10 mg Oral QAM   multivitamin 1 tablet Oral Daily   pantoprazole 40 mg Oral Q AM   pramipexole 0.5 mg Oral QAM   pramipexole 1 mg Oral Nightly   rivaroxaban 20 mg Oral Daily With Dinner     Assessment:  1. New onset atrial fibrillation with rapid ventricular response.  Her chadsvasc score is 2 for female gender and hypertension. She has been maintaining a sinus rhythm since conversion. She has been initiated on Xarelto for stroke prevention with no bleeding issues appreciated.     2. Long-standing hypertension (labile) with possible hypertensive heart disease as evidenced by left ventricular hypertrophy and atrial fibrillation.  3. Chest pains with some typical and some atypical features for CCS class III angina: Stress test negative for evidence of myocardial ischemia.   4. Strong positive family history of premature coronary artery disease with her mother reportedly having had heart attack at age 42.  5. Multinodular goiter with hyperthyroidism being treated with methimazole  6. Dyslipidemia, on pravastatin at home  7. GERD  8. Rheumatoid arthritis  9. Morbid obesity, status post gastric sleeve surgery about 2 years ago.  10. History of asthma.    Recommendations:  1. I have reviewed the results of her stress test and echocardiogram.   2. I have encouraged her to walk the halls this morning and monitor for any symptoms.   3. She can come off of her aspirin as she has no known history of coronary or peripheral arterial disease and a negative stress test. She reports a history of cardiac  catheterization about 8-10 years ago which she states was clean.   4. May consider gallbladder workup.   5. Follow up in our office at [place] in 3-4 weeks.    I discussed the patients findings and my recommendations with patient and family      LEVI Banerjee, acting as scribe for Dr. Sukh Aldana MD, [facility].  07/20/17  9:31 AM    Addendum:   I have seen and evaluated Ms. Wong and did the history and physical and formulated the impression and recommendation plan.  I've discussed the case with Elizabeth Arteaga who has scribed this note for me.     Sukh Aldana MD, FACC  07/20/17  9:31 AM

## 2017-07-20 NOTE — NURSING NOTE
Pt refuses IV access and telemetry monitoring at this time r/t discharge in the AM.  BRITTNEY Diaz

## 2017-08-07 ENCOUNTER — HOSPITAL ENCOUNTER (EMERGENCY)
Facility: HOSPITAL | Age: 52
Discharge: HOME | End: 2017-08-07
Attending: EMERGENCY MEDICINE | Admitting: EMERGENCY MEDICINE

## 2017-08-07 VITALS
HEART RATE: 95 BPM | BODY MASS INDEX: 46.93 KG/M2 | DIASTOLIC BLOOD PRESSURE: 73 MMHG | HEIGHT: 62 IN | TEMPERATURE: 97.6 F | OXYGEN SATURATION: 95 % | SYSTOLIC BLOOD PRESSURE: 113 MMHG | RESPIRATION RATE: 20 BRPM | WEIGHT: 255 LBS

## 2017-08-07 DIAGNOSIS — I48.20 CHRONIC ATRIAL FIBRILLATION (HCC): Primary | ICD-10-CM

## 2017-08-07 LAB
ALBUMIN SERPL-MCNC: 4.4 G/DL (ref 3.5–5)
ALBUMIN/GLOB SERPL: 1.3 G/DL (ref 1.5–2.5)
ALP SERPL-CCNC: 74 U/L (ref 35–104)
ALT SERPL W P-5'-P-CCNC: 32 U/L (ref 10–36)
ANION GAP SERPL CALCULATED.3IONS-SCNC: 10.4 MMOL/L (ref 3.6–11.2)
APTT PPP: 36.6 SECONDS (ref 23.8–36.1)
AST SERPL-CCNC: 29 U/L (ref 10–30)
BASOPHILS # BLD AUTO: 0.03 10*3/MM3 (ref 0–0.3)
BASOPHILS NFR BLD AUTO: 0.6 % (ref 0–2)
BILIRUB SERPL-MCNC: 0.4 MG/DL (ref 0.2–1.8)
BUN BLD-MCNC: 14 MG/DL (ref 7–21)
BUN/CREAT SERPL: 24.6 (ref 7–25)
CALCIUM SPEC-SCNC: 10.3 MG/DL (ref 7.7–10)
CHLORIDE SERPL-SCNC: 108 MMOL/L (ref 99–112)
CK MB SERPL-CCNC: 1.05 NG/ML (ref 0–5)
CO2 SERPL-SCNC: 24.6 MMOL/L (ref 24.3–31.9)
CREAT BLD-MCNC: 0.57 MG/DL (ref 0.43–1.29)
DEPRECATED RDW RBC AUTO: 36.7 FL (ref 37–54)
EOSINOPHIL # BLD AUTO: 0.13 10*3/MM3 (ref 0–0.7)
EOSINOPHIL NFR BLD AUTO: 2.6 % (ref 0–5)
ERYTHROCYTE [DISTWIDTH] IN BLOOD BY AUTOMATED COUNT: 12.2 % (ref 11.5–14.5)
GFR SERPL CREATININE-BSD FRML MDRD: 113 ML/MIN/1.73
GLOBULIN UR ELPH-MCNC: 3.3 GM/DL
GLUCOSE BLD-MCNC: 143 MG/DL (ref 70–110)
HCT VFR BLD AUTO: 38.6 % (ref 37–47)
HGB BLD-MCNC: 13.1 G/DL (ref 12–16)
HOLD SPECIMEN: NORMAL
HOLD SPECIMEN: NORMAL
IMM GRANULOCYTES # BLD: 0.01 10*3/MM3 (ref 0–0.03)
IMM GRANULOCYTES NFR BLD: 0.2 % (ref 0–0.5)
INR PPP: 1.13 (ref 0.9–1.1)
LYMPHOCYTES # BLD AUTO: 1.31 10*3/MM3 (ref 1–3)
LYMPHOCYTES NFR BLD AUTO: 26.5 % (ref 21–51)
MCH RBC QN AUTO: 28.2 PG (ref 27–33)
MCHC RBC AUTO-ENTMCNC: 33.9 G/DL (ref 33–37)
MCV RBC AUTO: 83.2 FL (ref 80–94)
MONOCYTES # BLD AUTO: 0.59 10*3/MM3 (ref 0.1–0.9)
MONOCYTES NFR BLD AUTO: 11.9 % (ref 0–10)
NEUTROPHILS # BLD AUTO: 2.88 10*3/MM3 (ref 1.4–6.5)
NEUTROPHILS NFR BLD AUTO: 58.2 % (ref 30–70)
OSMOLALITY SERPL CALC.SUM OF ELEC: 287.9 MOSM/KG (ref 273–305)
PLATELET # BLD AUTO: 331 10*3/MM3 (ref 130–400)
PMV BLD AUTO: 9.7 FL (ref 6–10)
POTASSIUM BLD-SCNC: 3.6 MMOL/L (ref 3.5–5.3)
PROT SERPL-MCNC: 7.7 G/DL (ref 6–8)
PROTHROMBIN TIME: 14.6 SECONDS (ref 11–15.4)
RBC # BLD AUTO: 4.64 10*6/MM3 (ref 4.2–5.4)
SODIUM BLD-SCNC: 143 MMOL/L (ref 135–153)
TROPONIN I SERPL-MCNC: <0.006 NG/ML
WBC NRBC COR # BLD: 4.95 10*3/MM3 (ref 4.5–12.5)
WHOLE BLOOD HOLD SPECIMEN: NORMAL
WHOLE BLOOD HOLD SPECIMEN: NORMAL

## 2017-08-07 PROCEDURE — 84484 ASSAY OF TROPONIN QUANT: CPT | Performed by: EMERGENCY MEDICINE

## 2017-08-07 PROCEDURE — 25010000002 LORAZEPAM PER 2 MG: Performed by: EMERGENCY MEDICINE

## 2017-08-07 PROCEDURE — 82553 CREATINE MB FRACTION: CPT | Performed by: EMERGENCY MEDICINE

## 2017-08-07 PROCEDURE — 93005 ELECTROCARDIOGRAM TRACING: CPT | Performed by: EMERGENCY MEDICINE

## 2017-08-07 PROCEDURE — 85025 COMPLETE CBC W/AUTO DIFF WBC: CPT | Performed by: EMERGENCY MEDICINE

## 2017-08-07 PROCEDURE — 85730 THROMBOPLASTIN TIME PARTIAL: CPT | Performed by: EMERGENCY MEDICINE

## 2017-08-07 PROCEDURE — 80053 COMPREHEN METABOLIC PANEL: CPT | Performed by: EMERGENCY MEDICINE

## 2017-08-07 PROCEDURE — 96376 TX/PRO/DX INJ SAME DRUG ADON: CPT

## 2017-08-07 PROCEDURE — 93010 ELECTROCARDIOGRAM REPORT: CPT | Performed by: INTERNAL MEDICINE

## 2017-08-07 PROCEDURE — 96375 TX/PRO/DX INJ NEW DRUG ADDON: CPT

## 2017-08-07 PROCEDURE — 96374 THER/PROPH/DIAG INJ IV PUSH: CPT

## 2017-08-07 PROCEDURE — 99284 EMERGENCY DEPT VISIT MOD MDM: CPT

## 2017-08-07 PROCEDURE — 85610 PROTHROMBIN TIME: CPT | Performed by: EMERGENCY MEDICINE

## 2017-08-07 RX ORDER — LORAZEPAM 2 MG/ML
1 INJECTION INTRAMUSCULAR ONCE
Status: COMPLETED | OUTPATIENT
Start: 2017-08-07 | End: 2017-08-07

## 2017-08-07 RX ORDER — DILTIAZEM HYDROCHLORIDE 5 MG/ML
10 INJECTION INTRAVENOUS ONCE
Status: COMPLETED | OUTPATIENT
Start: 2017-08-07 | End: 2017-08-07

## 2017-08-07 RX ORDER — SODIUM CHLORIDE 0.9 % (FLUSH) 0.9 %
10 SYRINGE (ML) INJECTION AS NEEDED
Status: DISCONTINUED | OUTPATIENT
Start: 2017-08-07 | End: 2017-08-07 | Stop reason: HOSPADM

## 2017-08-07 RX ADMIN — LORAZEPAM 1 MG: 2 INJECTION INTRAMUSCULAR; INTRAVENOUS at 11:30

## 2017-08-07 RX ADMIN — DILTIAZEM HYDROCHLORIDE 10 MG: 5 INJECTION INTRAVENOUS at 11:48

## 2017-08-07 NOTE — ED NOTES
EKG performed by [facility] at 0932 and shown to Dr. Hays.     Summer Montilla  08/07/17 3687

## 2017-08-07 NOTE — ED PROVIDER NOTES
Subjective   Patient is a 51 y.o. female presenting with palpitations.   Palpitations   Palpitations quality:  Fast  Onset quality:  Insidious  Timing:  Intermittent  Progression:  Waxing and waning  Chronicity:  Recurrent  Context: anxiety and caffeine    Relieved by:  Bed rest  Worsened by:  Stress  Associated symptoms: dizziness and shortness of breath    Associated symptoms: no chest pain        Review of Systems   Constitutional: Negative.  Negative for fever.   HENT: Negative.    Respiratory: Positive for shortness of breath.    Cardiovascular: Positive for palpitations. Negative for chest pain.   Gastrointestinal: Negative.  Negative for abdominal pain.   Endocrine: Negative.    Genitourinary: Negative.  Negative for dysuria.   Skin: Negative.    Neurological: Positive for dizziness.   Psychiatric/Behavioral: Negative.    All other systems reviewed and are negative.      Past Medical History:   Diagnosis Date   • Acid reflux    • Allergic    • Anxiety    • Asthma    • Atrial fibrillation    • Diastolic dysfunction     Grade I   • Gout    • Hyperlipidemia    • Hypertension    • Hyperthyroidism    • Morbid obesity    • RA (rheumatoid arthritis)    • Sleep apnea    • Vitamin D deficiency        Allergies   Allergen Reactions   • Lorcet [Hydrocodone-Acetaminophen] Other (See Comments)     Rash that bleeds         Past Surgical History:   Procedure Laterality Date   •  SECTION      x2   • FOOT SURGERY     • GASTRIC SLEEVE LAPAROSCOPIC      2 years ago   • HYSTERECTOMY      Complete   • KNEE SURGERY      2 (1 was partial knee replacement)   • NOSE SURGERY         Family History   Problem Relation Age of Onset   • No Known Problems Mother    • No Known Problems Father        Social History     Social History   • Marital status:      Spouse name: N/A   • Number of children: N/A   • Years of education: N/A     Social History Main Topics   • Smoking status: Never Smoker   • Smokeless tobacco: Never Used    • Alcohol use No      Comment: !x yearly   • Drug use: No   • Sexual activity: Defer      Comment: Hysterectomy     Other Topics Concern   • None     Social History Narrative           Objective   Physical Exam   Constitutional: She is oriented to person, place, and time. She appears well-developed and well-nourished. No distress.   Tachycardic, irregular   HENT:   Head: Normocephalic and atraumatic.   Right Ear: External ear normal.   Left Ear: External ear normal.   Nose: Nose normal.   Eyes: Conjunctivae and EOM are normal. Pupils are equal, round, and reactive to light.   Neck: Normal range of motion. Neck supple. No JVD present. No tracheal deviation present.   Cardiovascular: Normal rate, regular rhythm and normal heart sounds.    No murmur heard.  Pulmonary/Chest: Effort normal and breath sounds normal. No respiratory distress. She has no wheezes.   Abdominal: Soft. Bowel sounds are normal. There is no tenderness.   Musculoskeletal: Normal range of motion. She exhibits no edema or deformity.   Neurological: She is alert and oriented to person, place, and time. No cranial nerve deficit.   Skin: Skin is warm and dry. No rash noted. She is not diaphoretic. No erythema. No pallor.   Psychiatric: She has a normal mood and affect. Her behavior is normal. Thought content normal.   Nursing note and vitals reviewed.      Procedures         ED Course  ED Course                  MDM    Final diagnoses:   Chronic atrial fibrillation            Dharmesh Burton MD  08/07/17 4385

## 2017-09-13 ENCOUNTER — OFFICE VISIT (OUTPATIENT)
Dept: CARDIOLOGY | Facility: CLINIC | Age: 52
End: 2017-09-13

## 2017-09-13 VITALS
BODY MASS INDEX: 47.84 KG/M2 | HEIGHT: 62 IN | SYSTOLIC BLOOD PRESSURE: 147 MMHG | WEIGHT: 260 LBS | HEART RATE: 83 BPM | DIASTOLIC BLOOD PRESSURE: 92 MMHG

## 2017-09-13 DIAGNOSIS — I48.0 PAROXYSMAL ATRIAL FIBRILLATION (HCC): Primary | ICD-10-CM

## 2017-09-13 DIAGNOSIS — Z01.810 PREOPERATIVE CARDIOVASCULAR EXAMINATION: ICD-10-CM

## 2017-09-13 DIAGNOSIS — I10 ESSENTIAL HYPERTENSION: ICD-10-CM

## 2017-09-13 PROCEDURE — 99214 OFFICE O/P EST MOD 30 MIN: CPT | Performed by: INTERNAL MEDICINE

## 2017-09-13 PROCEDURE — 93000 ELECTROCARDIOGRAM COMPLETE: CPT | Performed by: INTERNAL MEDICINE

## 2017-09-13 RX ORDER — METOPROLOL TARTRATE 75 MG/1
75 TABLET, FILM COATED ORAL EVERY 12 HOURS SCHEDULED
Qty: 60 TABLET | Refills: 5 | Status: SHIPPED | OUTPATIENT
Start: 2017-09-13 | End: 2018-01-05

## 2017-09-13 RX ORDER — METOPROLOL TARTRATE 75 MG/1
75 TABLET, FILM COATED ORAL EVERY 12 HOURS SCHEDULED
Qty: 60 TABLET | Refills: 5 | Status: SHIPPED | OUTPATIENT
Start: 2017-09-13 | End: 2017-09-13 | Stop reason: SDUPTHER

## 2017-09-13 NOTE — PROGRESS NOTES
Jazzmine Zapata PA-C  Ondina Hayes  1965 09/13/2017    Patient Active Problem List   Diagnosis   • Rapid atrial fibrillation   • Paroxysmal atrial fibrillation   • Preoperative cardiovascular examination   • Hypertension       Dear Jazzmine Zapata PA-C:    Subjective     Ondina Hayes is a 51 y.o. female with the problems as listed above, presents      History of Present Illness:Ms. Wong is a pleasant 51-year-old  female with paroxysmal atrial fibrillation which apparently is due to her underlying hyperthyroidism associated with thyroid goiter for which she recently underwent evaluation by an endocrinologist in [place] and has been referred to a surgeon to have thyroidectomy in November 2017.  She is here seeking preoperative cardiac evaluation,to assess the cardiac risk and give clearance prior to thyroidectomy.  She says she had  another episode of atrial fibrillation with rapid ventricular response recently for which she had to be admitted to the hospital for one day and then discharged home next day in normal sinus rhythm.  She has been placed on metoprolol 50 g by mouth twice a day.  She is also on Xarelto 20 mg daily for stroke prevention.  She has not been placed on any antiarrhythmic drugs as it was felt that her atrial fibrillation is due to hyperthyroidism and should hopefully correct itself after addressing the hyperthyroidism.  She has some chronic dyspnea with the moderate exertion with no significant anginal symptoms.  She has had a negative nuclear stress test recently in July 2017 and a normal echo Doppler study as well.        Allergies   Allergen Reactions   • Lorcet [Hydrocodone-Acetaminophen] Other (See Comments)     Rash that bleeds     :      Current Outpatient Prescriptions:   •  albuterol (PROVENTIL HFA;VENTOLIN HFA) 108 (90 BASE) MCG/ACT inhaler, Inhale 2 puffs As Needed for Wheezing., Disp: , Rfl:   •  amitriptyline (ELAVIL) 100 MG tablet, Take 100 mg by mouth Every  Evening., Disp: , Rfl:   •  cyclobenzaprine (FLEXERIL) 5 MG tablet, Take 5 mg by mouth 3 (Three) Times a Day As Needed for Muscle Spasms., Disp: , Rfl:   •  hydrochlorothiazide (HYDRODIURIL) 25 MG tablet, Take 25 mg by mouth Daily. Patient states that she will not take if she has had to take the Lasix., Disp: , Rfl:   •  methIMAzole (TAPAZOLE) 10 MG tablet, Take 1 tablet by mouth Every 8 (Eight) Hours., Disp: 90 tablet, Rfl: 0  •  Metoprolol Tartrate 75 MG tablet, Take 75 mg by mouth Every 12 (Twelve) Hours., Disp: 60 tablet, Rfl: 5  •  montelukast (SINGULAIR) 10 MG tablet, Take 10 mg by mouth Every Morning., Disp: , Rfl:   •  omeprazole (priLOSEC) 20 MG capsule, Take 20 mg by mouth Daily., Disp: , Rfl:   •  potassium chloride (MICRO-K) 10 MEQ CR capsule, Take 10 mEq by mouth Daily As Needed., Disp: , Rfl:   •  pramipexole (MIRAPEX) 0.5 MG tablet, Take 0.5 mg by mouth Every Morning. Doctors office states to take 1 mg twice daily, patient states that she takes 0.5 mg in the morning and 1 mg at night., Disp: , Rfl:   •  pramipexole (MIRAPEX) 1 MG tablet, Take 1 mg by mouth Every Night. Doctors office states to take 1 mg twice daily, patient states that she takes 0.5 mg in the morning and 1 mg at night., Disp: , Rfl:   •  pravastatin (PRAVACHOL) 10 MG tablet, Take 10 mg by mouth Every Night., Disp: , Rfl:   •  ranitidine (ZANTAC) 150 MG capsule, Take 300 mg by mouth Every Night., Disp: , Rfl:   •  rivaroxaban (XARELTO) 20 MG tablet, Take 1 tablet by mouth Daily With Dinner., Disp: 30 tablet, Rfl: 3  •  multivitamin (DAILY ARACELI) tablet tablet, Take 1 tablet by mouth Daily., Disp: , Rfl:       The following portions of the patient's history were reviewed and updated as appropriate: allergies, current medications, past family history, past medical history, past social history, past surgical history and problem list.    Social History   Substance Use Topics   • Smoking status: Never Smoker   • Smokeless tobacco: Never Used  "  • Alcohol use No      Comment: !x yearly       Review of Systems   Constitution: Positive for fever and malaise/fatigue.   HENT: Positive for hearing loss and nosebleeds.    Eyes: Positive for blurred vision.   Cardiovascular: Positive for chest pain, dyspnea on exertion, leg swelling and palpitations.   Respiratory: Positive for shortness of breath.    Skin: Positive for dry skin and itching.   Musculoskeletal: Positive for joint pain, joint swelling, myalgias and stiffness.   Gastrointestinal: Positive for abdominal pain.   Neurological: Positive for dizziness, light-headedness, numbness and tremors.   Psychiatric/Behavioral: The patient has insomnia and is nervous/anxious.        Objective   Vitals:    09/13/17 1217   BP: 147/92   BP Location: Right arm   Patient Position: Sitting   Cuff Size: Adult   Pulse: 83   Weight: 260 lb (118 kg)   Height: 62\" (157.5 cm)     Body mass index is 47.55 kg/(m^2).        Physical Exam   Constitutional: She is oriented to person, place, and time. She appears well-developed and well-nourished.   HENT:   Head: Normocephalic.   Eyes: Conjunctivae and EOM are normal.   Neck: Normal range of motion. Neck supple. No JVD present. No tracheal deviation present. No thyromegaly present.   Has some nodules palpated in the thyroid region bilaterally.   Cardiovascular: Normal rate and regular rhythm.  Exam reveals no gallop and no friction rub.    No murmur heard.  Pulmonary/Chest: Breath sounds normal. No respiratory distress. She has no wheezes. She has no rales.   Abdominal: Soft. Bowel sounds are normal. She exhibits no mass. There is no tenderness.   Musculoskeletal: She exhibits edema.   Neurological: She is alert and oriented to person, place, and time. No cranial nerve deficit.   Skin: Skin is warm and dry.   Psychiatric: She has a normal mood and affect.         Ondina Hayes   Regadenoson Stress Test With Myocardial Perfusion SPECT (Multi Study)   Order# [id number]   Reading " physician: Sukh Aldana MD Ordering physician: Sukh Aldana MD Study date: 17   Patient Information   Patient Name MRN Sex  (Age)   Ondina Hayes [id number] Female 1965 (51 y.o.)   Interpretation Summary   · Findings consistent with a normal ECG stress test.  · Left ventricular ejection fraction is hyperdynamic (Calculated EF > 70%).  · Myocardial perfusion imaging indicates a normal myocardial perfusion study with no evidence of ischemia.  · Impressions are consistent with a low risk study.         Ondina Hayes   Acquired echocardiogram 2D complete   Order# [id number]   Reading physician: Sukh Aldana MD Ordering physician: LEVI Huynh Study date: 17   Patient Information   Patient Name MRBEN Sex  (Age)   Ondina Hayes [id number] Female 1965 (51 y.o.)   Sedation Narrator Report   Sedation Narrator Report   Interpretation Summary   · Normal left ventricular cavity size noted. All left ventricular wall segments contract normally. Left ventricular wall thickness is consistent with mild concentric hypertrophy.  · Estimated EF appears to be in the range of 61 - 65%.  · The aortic valve is structurally normal. No aortic valve regurgitation is present. No aortic valve stenosis is present.  · The mitral valve is normal in structure. No mitral valve regurgitation is present. No significant mitral valve stenosis is present.  · The tricuspid valve is normal. No tricuspid valve stenosis is present. No tricuspid valve regurgitation is present. Estimated right ventricular systolic pressure from tricuspid regurgitation is normal (<35 mmHg).  · The pulmonic valve is structurally normal. There is no significant pulmonic valve stenosis present. There is no pulmonic valve regurgitation present.  · There is no evidence of pericardial effusion.           Lab Results   Component Value Date     2017    K 3.6 2017     2017    CO2 24.6 2017    BUN 14  08/07/2017    CREATININE 0.57 08/07/2017    GLUCOSE 143 (H) 08/07/2017    CALCIUM 10.3 (H) 08/07/2017    AST 29 08/07/2017    ALT 32 08/07/2017    ALKPHOS 74 08/07/2017    LABIL2 1.3 (L) 08/07/2017     Lab Results   Component Value Date    CKTOTAL 27 07/18/2017     Lab Results   Component Value Date    WBC 4.95 08/07/2017    HGB 13.1 08/07/2017    HCT 38.6 08/07/2017     08/07/2017     Lab Results   Component Value Date    INR 1.13 (H) 08/07/2017    INR 1.03 07/17/2017    INR 0.99 07/17/2017     Lab Results   Component Value Date    MG 1.7 07/17/2017     Lab Results   Component Value Date    TSH <0.010 (L) 07/17/2017    CHLPL 164 03/10/2015    TRIG 82 03/10/2015    HDL 49 (L) 03/10/2015    LDL 99 03/10/2015      No results found for: BNP  Echo   No results found for: ECHOEFEST    ECG 12 Lead  Date/Time: 9/13/2017 1:46 PM  Performed by: SUKH BRYANT  Authorized by: SUKH BRYANT   Rhythm: sinus rhythm  Other findings: PRWP          Assessment/Plan      1. Paroxysmal atrial fibrillation     2. Preoperative cardiovascular examination     3. Essential hypertension            Recommendations:    1. Continue with metoprolol and increase the dose to 75 mg by mouth twice a day.  2. Continue Xarelto for now.  May hold her Xarelto for 2 days prior to surgery and then restart this whenever it is safe after surgery.  Please note that the Xarelto starts to work fairly quickly after starting it.  3. Since her cardiac status appears to be stable at this time, she is clear for thyroidectomy surgery with an acceptable cardiac risk.  4. Follow-up in 3-4 months.       Return in about 4 months (around 1/13/2018).    As always, I appreciate very much the opportunity to participate in the cardiovascular care of your patients.      With Best Regards,    Sukh Bryant MD, [facility]

## 2017-09-28 ENCOUNTER — TELEPHONE (OUTPATIENT)
Dept: CARDIOLOGY | Facility: CLINIC | Age: 52
End: 2017-09-28

## 2017-10-02 NOTE — TELEPHONE ENCOUNTER
Dr. Aldana had given instructions regarding her Xarelto at her last visit on 09/13/17. If the surgeon is wanting to bridge, they will need to give her instructions to do so.

## 2017-10-03 NOTE — TELEPHONE ENCOUNTER
Called pt and advised her. She stated that she will let them know when she goes to the office on the 11th to get a US done.

## 2017-11-13 ENCOUNTER — APPOINTMENT (OUTPATIENT)
Dept: PREADMISSION TESTING | Facility: HOSPITAL | Age: 52
End: 2017-11-13

## 2017-11-13 VITALS — HEIGHT: 62 IN | BODY MASS INDEX: 47.63 KG/M2 | WEIGHT: 258.82 LBS

## 2017-11-13 LAB
ALBUMIN SERPL-MCNC: 4.3 G/DL (ref 3.2–4.8)
ALBUMIN/GLOB SERPL: 1.6 G/DL (ref 1.5–2.5)
ALP SERPL-CCNC: 120 U/L (ref 25–100)
ALT SERPL W P-5'-P-CCNC: 24 U/L (ref 7–40)
ANION GAP SERPL CALCULATED.3IONS-SCNC: 6 MMOL/L (ref 3–11)
AST SERPL-CCNC: 20 U/L (ref 0–33)
BILIRUB SERPL-MCNC: 0.3 MG/DL (ref 0.3–1.2)
BUN BLD-MCNC: 12 MG/DL (ref 9–23)
BUN/CREAT SERPL: 17.1 (ref 7–25)
CALCIUM SPEC-SCNC: 9.4 MG/DL (ref 8.7–10.4)
CHLORIDE SERPL-SCNC: 106 MMOL/L (ref 99–109)
CO2 SERPL-SCNC: 29 MMOL/L (ref 20–31)
CREAT BLD-MCNC: 0.7 MG/DL (ref 0.6–1.3)
DEPRECATED RDW RBC AUTO: 41.4 FL (ref 37–54)
ERYTHROCYTE [DISTWIDTH] IN BLOOD BY AUTOMATED COUNT: 13.4 % (ref 11.3–14.5)
GFR SERPL CREATININE-BSD FRML MDRD: 89 ML/MIN/1.73
GLOBULIN UR ELPH-MCNC: 2.7 GM/DL
GLUCOSE BLD-MCNC: 102 MG/DL (ref 70–100)
HCT VFR BLD AUTO: 40.6 % (ref 34.5–44)
HGB BLD-MCNC: 13.5 G/DL (ref 11.5–15.5)
MCH RBC QN AUTO: 28.4 PG (ref 27–31)
MCHC RBC AUTO-ENTMCNC: 33.3 G/DL (ref 32–36)
MCV RBC AUTO: 85.3 FL (ref 80–99)
PLATELET # BLD AUTO: 310 10*3/MM3 (ref 150–450)
PMV BLD AUTO: 9.3 FL (ref 6–12)
POTASSIUM BLD-SCNC: 3.9 MMOL/L (ref 3.5–5.5)
PROT SERPL-MCNC: 7 G/DL (ref 5.7–8.2)
RBC # BLD AUTO: 4.76 10*6/MM3 (ref 3.89–5.14)
SODIUM BLD-SCNC: 141 MMOL/L (ref 132–146)
WBC NRBC COR # BLD: 6.39 10*3/MM3 (ref 3.5–10.8)

## 2017-11-16 ENCOUNTER — HOSPITAL ENCOUNTER (OUTPATIENT)
Facility: HOSPITAL | Age: 52
Setting detail: SURGERY ADMIT
Discharge: HOME | End: 2017-11-16
Attending: OTOLARYNGOLOGY | Admitting: OTOLARYNGOLOGY

## 2017-11-16 ENCOUNTER — ANESTHESIA EVENT (OUTPATIENT)
Dept: PERIOP | Facility: HOSPITAL | Age: 52
End: 2017-11-16

## 2017-11-16 ENCOUNTER — ANESTHESIA (OUTPATIENT)
Dept: PERIOP | Facility: HOSPITAL | Age: 52
End: 2017-11-16

## 2017-11-16 VITALS
HEIGHT: 62 IN | TEMPERATURE: 97.2 F | HEART RATE: 97 BPM | BODY MASS INDEX: 47.48 KG/M2 | SYSTOLIC BLOOD PRESSURE: 155 MMHG | RESPIRATION RATE: 16 BRPM | OXYGEN SATURATION: 96 % | WEIGHT: 258 LBS | DIASTOLIC BLOOD PRESSURE: 75 MMHG

## 2017-11-16 DIAGNOSIS — R94.6 HYPERTHYROIDISM DETERMINED BY THYROID FUNCTION TEST: ICD-10-CM

## 2017-11-16 DIAGNOSIS — E05.90 HYPERTHYROIDISM DETERMINED BY THYROID FUNCTION TEST: ICD-10-CM

## 2017-11-16 DIAGNOSIS — E04.9 THYROID GOITER: ICD-10-CM

## 2017-11-16 PROCEDURE — 25010000002 FENTANYL CITRATE (PF) 100 MCG/2ML SOLUTION: Performed by: NURSE ANESTHETIST, CERTIFIED REGISTERED

## 2017-11-16 PROCEDURE — 25010000002 SUCCINYLCHOLINE PER 20 MG: Performed by: NURSE ANESTHETIST, CERTIFIED REGISTERED

## 2017-11-16 PROCEDURE — 25010000002 DEXAMETHASONE PER 1 MG: Performed by: NURSE ANESTHETIST, CERTIFIED REGISTERED

## 2017-11-16 PROCEDURE — 88307 TISSUE EXAM BY PATHOLOGIST: CPT | Performed by: OTOLARYNGOLOGY

## 2017-11-16 PROCEDURE — 25010000002 ONDANSETRON PER 1 MG: Performed by: NURSE ANESTHETIST, CERTIFIED REGISTERED

## 2017-11-16 PROCEDURE — 25010000002 PROPOFOL 10 MG/ML EMULSION: Performed by: NURSE ANESTHETIST, CERTIFIED REGISTERED

## 2017-11-16 PROCEDURE — 25010000003 CEFAZOLIN 1 GM/50ML SOLUTION: Performed by: OTOLARYNGOLOGY

## 2017-11-16 RX ORDER — SUCCINYLCHOLINE CHLORIDE 20 MG/ML
INJECTION INTRAMUSCULAR; INTRAVENOUS AS NEEDED
Status: DISCONTINUED | OUTPATIENT
Start: 2017-11-16 | End: 2017-11-16 | Stop reason: SURG

## 2017-11-16 RX ORDER — LIDOCAINE HYDROCHLORIDE AND EPINEPHRINE 10; 10 MG/ML; UG/ML
INJECTION, SOLUTION INFILTRATION; PERINEURAL AS NEEDED
Status: DISCONTINUED | OUTPATIENT
Start: 2017-11-16 | End: 2017-11-16 | Stop reason: HOSPADM

## 2017-11-16 RX ORDER — IPRATROPIUM BROMIDE AND ALBUTEROL SULFATE 2.5; .5 MG/3ML; MG/3ML
3 SOLUTION RESPIRATORY (INHALATION) ONCE AS NEEDED
Status: DISCONTINUED | OUTPATIENT
Start: 2017-11-16 | End: 2017-11-16 | Stop reason: HOSPADM

## 2017-11-16 RX ORDER — CALCITRIOL 0.25 UG/1
0.25 CAPSULE, LIQUID FILLED ORAL 2 TIMES DAILY
Qty: 60 CAPSULE | Refills: 1 | Status: SHIPPED | OUTPATIENT
Start: 2017-11-16 | End: 2020-06-10 | Stop reason: ALTCHOICE

## 2017-11-16 RX ORDER — ONDANSETRON 2 MG/ML
INJECTION INTRAMUSCULAR; INTRAVENOUS AS NEEDED
Status: DISCONTINUED | OUTPATIENT
Start: 2017-11-16 | End: 2017-11-16 | Stop reason: SURG

## 2017-11-16 RX ORDER — HYDROMORPHONE HYDROCHLORIDE 1 MG/ML
0.5 INJECTION, SOLUTION INTRAMUSCULAR; INTRAVENOUS; SUBCUTANEOUS
Status: DISCONTINUED | OUTPATIENT
Start: 2017-11-16 | End: 2017-11-16 | Stop reason: HOSPADM

## 2017-11-16 RX ORDER — FENTANYL CITRATE 50 UG/ML
50 INJECTION, SOLUTION INTRAMUSCULAR; INTRAVENOUS
Status: DISCONTINUED | OUTPATIENT
Start: 2017-11-16 | End: 2017-11-16 | Stop reason: HOSPADM

## 2017-11-16 RX ORDER — LEVOTHYROXINE SODIUM 0.12 MG/1
125 TABLET ORAL DAILY
Qty: 30 TABLET | Refills: 1 | Status: SHIPPED | OUTPATIENT
Start: 2017-11-16 | End: 2020-09-01 | Stop reason: DRUGHIGH

## 2017-11-16 RX ORDER — CALCIUM CARBONATE 200(500)MG
3 TABLET,CHEWABLE ORAL 4 TIMES DAILY
Qty: 500 TABLET | Refills: 1 | Status: SHIPPED | OUTPATIENT
Start: 2017-11-16 | End: 2018-09-24 | Stop reason: ALTCHOICE

## 2017-11-16 RX ORDER — SODIUM CHLORIDE 0.9 % (FLUSH) 0.9 %
1-10 SYRINGE (ML) INJECTION AS NEEDED
Status: DISCONTINUED | OUTPATIENT
Start: 2017-11-16 | End: 2017-11-16 | Stop reason: HOSPADM

## 2017-11-16 RX ORDER — PROMETHAZINE HYDROCHLORIDE 25 MG/ML
6.25 INJECTION, SOLUTION INTRAMUSCULAR; INTRAVENOUS ONCE AS NEEDED
Status: DISCONTINUED | OUTPATIENT
Start: 2017-11-16 | End: 2017-11-16 | Stop reason: HOSPADM

## 2017-11-16 RX ORDER — PROPOFOL 10 MG/ML
VIAL (ML) INTRAVENOUS AS NEEDED
Status: DISCONTINUED | OUTPATIENT
Start: 2017-11-16 | End: 2017-11-16 | Stop reason: SURG

## 2017-11-16 RX ORDER — FAMOTIDINE 20 MG/1
20 TABLET, FILM COATED ORAL ONCE
Status: COMPLETED | OUTPATIENT
Start: 2017-11-16 | End: 2017-11-16

## 2017-11-16 RX ORDER — SODIUM CHLORIDE, SODIUM LACTATE, POTASSIUM CHLORIDE, CALCIUM CHLORIDE 600; 310; 30; 20 MG/100ML; MG/100ML; MG/100ML; MG/100ML
9 INJECTION, SOLUTION INTRAVENOUS CONTINUOUS
Status: DISCONTINUED | OUTPATIENT
Start: 2017-11-16 | End: 2017-11-16 | Stop reason: HOSPADM

## 2017-11-16 RX ORDER — FAMOTIDINE 10 MG/ML
20 INJECTION, SOLUTION INTRAVENOUS ONCE
Status: CANCELLED | OUTPATIENT
Start: 2017-11-16 | End: 2017-11-16

## 2017-11-16 RX ORDER — PROMETHAZINE HYDROCHLORIDE 25 MG/1
25 TABLET ORAL ONCE AS NEEDED
Status: DISCONTINUED | OUTPATIENT
Start: 2017-11-16 | End: 2017-11-16 | Stop reason: HOSPADM

## 2017-11-16 RX ORDER — LIDOCAINE HYDROCHLORIDE 10 MG/ML
0.5 INJECTION, SOLUTION EPIDURAL; INFILTRATION; INTRACAUDAL; PERINEURAL ONCE AS NEEDED
Status: DISCONTINUED | OUTPATIENT
Start: 2017-11-16 | End: 2017-11-16 | Stop reason: HOSPADM

## 2017-11-16 RX ORDER — ONDANSETRON 2 MG/ML
4 INJECTION INTRAMUSCULAR; INTRAVENOUS ONCE AS NEEDED
Status: DISCONTINUED | OUTPATIENT
Start: 2017-11-16 | End: 2017-11-16 | Stop reason: HOSPADM

## 2017-11-16 RX ORDER — BACITRACIN ZINC 500 [USP'U]/G
OINTMENT TOPICAL AS NEEDED
Status: DISCONTINUED | OUTPATIENT
Start: 2017-11-16 | End: 2017-11-16 | Stop reason: HOSPADM

## 2017-11-16 RX ORDER — TRAMADOL HYDROCHLORIDE 50 MG/1
50 TABLET ORAL EVERY 6 HOURS PRN
Qty: 40 TABLET | Refills: 0 | Status: SHIPPED | OUTPATIENT
Start: 2017-11-16 | End: 2018-09-24 | Stop reason: ALTCHOICE

## 2017-11-16 RX ORDER — DEXAMETHASONE SODIUM PHOSPHATE 4 MG/ML
INJECTION, SOLUTION INTRA-ARTICULAR; INTRALESIONAL; INTRAMUSCULAR; INTRAVENOUS; SOFT TISSUE AS NEEDED
Status: DISCONTINUED | OUTPATIENT
Start: 2017-11-16 | End: 2017-11-16 | Stop reason: SURG

## 2017-11-16 RX ORDER — FENTANYL CITRATE 50 UG/ML
INJECTION, SOLUTION INTRAMUSCULAR; INTRAVENOUS AS NEEDED
Status: DISCONTINUED | OUTPATIENT
Start: 2017-11-16 | End: 2017-11-16 | Stop reason: SURG

## 2017-11-16 RX ORDER — LIDOCAINE HYDROCHLORIDE 10 MG/ML
INJECTION, SOLUTION EPIDURAL; INFILTRATION; INTRACAUDAL; PERINEURAL AS NEEDED
Status: DISCONTINUED | OUTPATIENT
Start: 2017-11-16 | End: 2017-11-16 | Stop reason: SURG

## 2017-11-16 RX ORDER — CEPHALEXIN 500 MG/1
500 CAPSULE ORAL 4 TIMES DAILY
Qty: 28 CAPSULE | Refills: 0 | Status: SHIPPED | OUTPATIENT
Start: 2017-11-16 | End: 2018-09-24 | Stop reason: ALTCHOICE

## 2017-11-16 RX ORDER — PROMETHAZINE HYDROCHLORIDE 25 MG/1
25 SUPPOSITORY RECTAL ONCE AS NEEDED
Status: DISCONTINUED | OUTPATIENT
Start: 2017-11-16 | End: 2017-11-16 | Stop reason: HOSPADM

## 2017-11-16 RX ORDER — MAGNESIUM HYDROXIDE 1200 MG/15ML
LIQUID ORAL AS NEEDED
Status: DISCONTINUED | OUTPATIENT
Start: 2017-11-16 | End: 2017-11-16 | Stop reason: HOSPADM

## 2017-11-16 RX ORDER — CALCITRIOL 0.25 UG/1
0.25 CAPSULE, LIQUID FILLED ORAL 2 TIMES DAILY
Qty: 60 CAPSULE | Refills: 1 | OUTPATIENT
Start: 2017-11-16 | End: 2020-06-10 | Stop reason: ALTCHOICE

## 2017-11-16 RX ADMIN — SUCCINYLCHOLINE CHLORIDE 180 MG: 20 INJECTION, SOLUTION INTRAMUSCULAR; INTRAVENOUS at 12:12

## 2017-11-16 RX ADMIN — FENTANYL CITRATE 25 MCG: 50 INJECTION, SOLUTION INTRAMUSCULAR; INTRAVENOUS at 12:35

## 2017-11-16 RX ADMIN — FENTANYL CITRATE 100 MCG: 50 INJECTION, SOLUTION INTRAMUSCULAR; INTRAVENOUS at 12:06

## 2017-11-16 RX ADMIN — PROPOFOL 100 MG: 10 INJECTION, EMULSION INTRAVENOUS at 12:23

## 2017-11-16 RX ADMIN — EPHEDRINE SULFATE 10 MG: 50 INJECTION INTRAMUSCULAR; INTRAVENOUS; SUBCUTANEOUS at 12:53

## 2017-11-16 RX ADMIN — FENTANYL CITRATE 25 MCG: 50 INJECTION, SOLUTION INTRAMUSCULAR; INTRAVENOUS at 13:40

## 2017-11-16 RX ADMIN — SODIUM CHLORIDE, POTASSIUM CHLORIDE, SODIUM LACTATE AND CALCIUM CHLORIDE 9 ML/HR: 600; 310; 30; 20 INJECTION, SOLUTION INTRAVENOUS at 12:03

## 2017-11-16 RX ADMIN — ONDANSETRON 4 MG: 2 INJECTION INTRAMUSCULAR; INTRAVENOUS at 13:47

## 2017-11-16 RX ADMIN — FAMOTIDINE 20 MG: 20 TABLET, FILM COATED ORAL at 12:03

## 2017-11-16 RX ADMIN — FENTANYL CITRATE 50 MCG: 50 INJECTION, SOLUTION INTRAMUSCULAR; INTRAVENOUS at 12:12

## 2017-11-16 RX ADMIN — SODIUM CHLORIDE, POTASSIUM CHLORIDE, SODIUM LACTATE AND CALCIUM CHLORIDE: 600; 310; 30; 20 INJECTION, SOLUTION INTRAVENOUS at 14:10

## 2017-11-16 RX ADMIN — PROPOFOL 200 MG: 10 INJECTION, EMULSION INTRAVENOUS at 12:12

## 2017-11-16 RX ADMIN — LIDOCAINE HYDROCHLORIDE 50 MG: 10 INJECTION, SOLUTION EPIDURAL; INFILTRATION; INTRACAUDAL; PERINEURAL at 12:12

## 2017-11-16 RX ADMIN — FENTANYL CITRATE 50 MCG: 50 INJECTION INTRAMUSCULAR; INTRAVENOUS at 14:33

## 2017-11-16 RX ADMIN — CEFAZOLIN SODIUM 2 G: 1 INJECTION, SOLUTION INTRAVENOUS at 12:12

## 2017-11-16 RX ADMIN — DEXAMETHASONE SODIUM PHOSPHATE 8 MG: 4 INJECTION, SOLUTION INTRAMUSCULAR; INTRAVENOUS at 12:43

## 2017-11-16 RX ADMIN — LIDOCAINE HYDROCHLORIDE 100 MG: 10 INJECTION, SOLUTION EPIDURAL; INFILTRATION; INTRACAUDAL; PERINEURAL at 13:55

## 2017-11-16 RX ADMIN — EPHEDRINE SULFATE 10 MG: 50 INJECTION INTRAMUSCULAR; INTRAVENOUS; SUBCUTANEOUS at 12:48

## 2017-11-16 RX ADMIN — FENTANYL CITRATE 50 MCG: 50 INJECTION, SOLUTION INTRAMUSCULAR; INTRAVENOUS at 12:15

## 2017-11-16 RX ADMIN — EPHEDRINE SULFATE 10 MG: 50 INJECTION INTRAMUSCULAR; INTRAVENOUS; SUBCUTANEOUS at 12:51

## 2017-11-16 RX ADMIN — FENTANYL CITRATE 50 MCG: 50 INJECTION INTRAMUSCULAR; INTRAVENOUS at 14:21

## 2017-11-20 LAB
CYTO UR: NORMAL
LAB AP CASE REPORT: NORMAL
LAB AP CLINICAL INFORMATION: NORMAL
Lab: NORMAL
PATH REPORT.FINAL DX SPEC: NORMAL
PATH REPORT.GROSS SPEC: NORMAL

## 2018-01-05 ENCOUNTER — OFFICE VISIT (OUTPATIENT)
Dept: CARDIOLOGY | Facility: CLINIC | Age: 53
End: 2018-01-05

## 2018-01-05 ENCOUNTER — HOSPITAL ENCOUNTER (OUTPATIENT)
Dept: RESPIRATORY THERAPY | Facility: HOSPITAL | Age: 53
Discharge: HOME | End: 2018-01-05
Admitting: PHYSICIAN ASSISTANT

## 2018-01-05 VITALS
HEIGHT: 62 IN | RESPIRATION RATE: 16 BRPM | BODY MASS INDEX: 48.8 KG/M2 | HEART RATE: 81 BPM | DIASTOLIC BLOOD PRESSURE: 89 MMHG | SYSTOLIC BLOOD PRESSURE: 151 MMHG | WEIGHT: 265.2 LBS

## 2018-01-05 DIAGNOSIS — R00.2 PALPITATIONS: ICD-10-CM

## 2018-01-05 DIAGNOSIS — I48.0 PAROXYSMAL ATRIAL FIBRILLATION (HCC): ICD-10-CM

## 2018-01-05 DIAGNOSIS — R00.2 PALPITATIONS: Primary | ICD-10-CM

## 2018-01-05 DIAGNOSIS — Z79.01 LONG TERM CURRENT USE OF ANTICOAGULANT: ICD-10-CM

## 2018-01-05 DIAGNOSIS — E78.5 DYSLIPIDEMIA: ICD-10-CM

## 2018-01-05 DIAGNOSIS — R07.2 PRECORDIAL PAIN: ICD-10-CM

## 2018-01-05 DIAGNOSIS — I10 ESSENTIAL HYPERTENSION: ICD-10-CM

## 2018-01-05 PROBLEM — Z01.810 PREOPERATIVE CARDIOVASCULAR EXAMINATION: Status: RESOLVED | Noted: 2017-09-13 | Resolved: 2018-01-05

## 2018-01-05 PROCEDURE — 93225 XTRNL ECG REC<48 HRS REC: CPT

## 2018-01-05 PROCEDURE — 93227 XTRNL ECG REC<48 HR R&I: CPT | Performed by: INTERNAL MEDICINE

## 2018-01-05 PROCEDURE — 93000 ELECTROCARDIOGRAM COMPLETE: CPT | Performed by: PHYSICIAN ASSISTANT

## 2018-01-05 PROCEDURE — 93226 XTRNL ECG REC<48 HR SCAN A/R: CPT

## 2018-01-05 PROCEDURE — 99214 OFFICE O/P EST MOD 30 MIN: CPT | Performed by: PHYSICIAN ASSISTANT

## 2018-01-05 RX ORDER — METOPROLOL TARTRATE 100 MG/1
100 TABLET ORAL EVERY 12 HOURS SCHEDULED
Qty: 60 TABLET | Refills: 3 | Status: SHIPPED | OUTPATIENT
Start: 2018-01-05 | End: 2018-06-07 | Stop reason: SDUPTHER

## 2018-01-05 RX ORDER — HYDROCHLOROTHIAZIDE 12.5 MG/1
12.5 TABLET ORAL DAILY
Qty: 30 TABLET | Refills: 3 | Status: SHIPPED | OUTPATIENT
Start: 2018-01-05 | End: 2018-06-07 | Stop reason: SDUPTHER

## 2018-01-05 RX ORDER — ISOSORBIDE MONONITRATE 30 MG/1
30 TABLET, EXTENDED RELEASE ORAL DAILY
Qty: 30 TABLET | Refills: 3 | Status: SHIPPED | OUTPATIENT
Start: 2018-01-05 | End: 2018-09-24 | Stop reason: SINTOL

## 2018-01-05 NOTE — PROGRESS NOTES
Jazzmine Zapata PA-C  Ondina Hayes  1965 01/05/2018    Patient Active Problem List   Diagnosis   • Rapid atrial fibrillation   • Paroxysmal atrial fibrillation   • Hypertension     Dear Jazzmine Zapata PA-C:    Chief Complaint   Patient presents with   • Atrial Fibrillation   • Hypertension     Subjective     Ondina Hayes is a 52 y.o. female with a past medical history significant for A previous history of paroxysmal atrial fibrillation.  She had an overactive thyroid and a quarter with a complete thyroidectomy a few months ago.  She states that her nervous feeling has improved, however she continues to have palpitations with feeling of fast heartbeat associated with high blood pressure, at times greater than 100 diastolic.  This is associated with a hot flushed feeling.  She also complains of eye speak type chest pain which occurs with exertion intermittently.  She is unsure if this is increased in frequency or severity.  She has not recently tried any sublingual nitroglycerin.  She reports that she is previously had 2 left heart catheterizations in the past with apparently normal coronaries, with the most recent being 8-10 years ago.  She had a recent stress test and echocardiogram in July which were unremarkable.      Current Outpatient Prescriptions:   •  albuterol (PROVENTIL HFA;VENTOLIN HFA) 108 (90 BASE) MCG/ACT inhaler, Inhale 2 puffs As Needed for Wheezing., Disp: , Rfl:   •  amitriptyline (ELAVIL) 100 MG tablet, Take 100 mg by mouth Every Evening., Disp: , Rfl:   •  calcitriol (ROCALTROL) 0.25 MCG capsule, Take 1 capsule by mouth 2 (Two) Times a Day., Disp: 60 capsule, Rfl: 1  •  calcitriol (ROCALTROL) 0.25 MCG capsule, Take 1 capsule by mouth 2 (Two) Times a Day., Disp: 60 capsule, Rfl: 1  •  calcium carbonate (TUMS) 500 MG chewable tablet, Chew 1,500 mg 4 (Four) Times a Day., Disp: 500 tablet, Rfl: 1  •  cephalexin (KEFLEX) 500 MG capsule, Take 1 capsule by mouth 4 (Four) Times a Day.,  Disp: 28 capsule, Rfl: 0  •  cyclobenzaprine (FLEXERIL) 5 MG tablet, Take 5 mg by mouth 3 (Three) Times a Day As Needed for Muscle Spasms., Disp: , Rfl:   •  hydrochlorothiazide (HYDRODIURIL) 12.5 MG tablet, Take 1 tablet by mouth Daily., Disp: 30 tablet, Rfl: 3  •  levothyroxine (SYNTHROID) 125 MCG tablet, Take 1 tablet by mouth Daily., Disp: 30 tablet, Rfl: 1  •  montelukast (SINGULAIR) 10 MG tablet, Take 10 mg by mouth Every Morning., Disp: , Rfl:   •  multivitamin (DAILY ARACELI) tablet tablet, Take 1 tablet by mouth Daily., Disp: , Rfl:   •  omeprazole (priLOSEC) 20 MG capsule, Take 20 mg by mouth Daily As Needed., Disp: , Rfl:   •  potassium chloride (MICRO-K) 10 MEQ CR capsule, Take 10 mEq by mouth Daily As Needed., Disp: , Rfl:   •  pramipexole (MIRAPEX) 1 MG tablet, Take 1 mg by mouth Every Night. Doctors office states to take 1 mg twice daily, patient states that she takes 0.5 mg in the morning and 1 mg at night., Disp: , Rfl:   •  pravastatin (PRAVACHOL) 10 MG tablet, Take 10 mg by mouth Every Night., Disp: , Rfl:   •  ranitidine (ZANTAC) 150 MG capsule, Take 150 mg by mouth. TAKES 150 AT LEAST ONCE SOMETIMES TWICE A DAY, Disp: , Rfl:   •  rivaroxaban (XARELTO) 20 MG tablet, Take 1 tablet by mouth Daily With Dinner. Indications: RESUME TAKING IN 3 DAYS, Disp: 30 tablet, Rfl: 3  •  traMADol (ULTRAM) 50 MG tablet, Take 1 tablet by mouth Every 6 (Six) Hours As Needed for Moderate Pain ., Disp: 40 tablet, Rfl: 0  •  isosorbide mononitrate (IMDUR) 30 MG 24 hr tablet, Take 1 tablet by mouth Daily., Disp: 30 tablet, Rfl: 3  •  metoprolol tartrate (LOPRESSOR) 100 MG tablet, Take 1 tablet by mouth Every 12 (Twelve) Hours., Disp: 60 tablet, Rfl: 3    The following portions of the patient's history were reviewed and updated as appropriate: allergies, current medications, past family history, past medical history, past social history, past surgical history and problem list.    Social History     Social History   •  "Marital status:      Spouse name: N/A   • Number of children: N/A   • Years of education: N/A     Occupational History   • Not on file.     Social History Main Topics   • Smoking status: Never Smoker   • Smokeless tobacco: Never Used   • Alcohol use No      Comment: !x yearly   • Drug use: No   • Sexual activity: Defer      Comment: Hysterectomy     Other Topics Concern   • Not on file     Social History Narrative     Review of Systems   Constitution: Negative for chills and fever.   HENT: Negative for nosebleeds and sore throat.    Cardiovascular: Positive for chest pain, leg swelling and palpitations. Negative for syncope.   Respiratory: Positive for shortness of breath. Negative for cough, hemoptysis and wheezing.    Gastrointestinal: Negative for abdominal pain, hematemesis, hematochezia, melena, nausea and vomiting.   Genitourinary: Negative for dysuria and hematuria.   Neurological: Positive for dizziness. Negative for headaches.     Objective   Blood pressure 151/89, pulse 81, resp. rate 16, height 157.5 cm (62.01\"), weight 120 kg (265 lb 3.2 oz).    Physical Exam   Constitutional: She is oriented to person, place, and time. She appears well-developed and well-nourished. No distress.   HENT:   Head: Normocephalic and atraumatic.   Eyes: Conjunctivae are normal. Right eye exhibits no discharge. Left eye exhibits no discharge.   Neck: Normal range of motion. Neck supple. Carotid bruit is not present.   Cardiovascular: Normal rate, regular rhythm and normal heart sounds.  Exam reveals no gallop and no friction rub.    No murmur heard.  Pulmonary/Chest: Effort normal and breath sounds normal. No respiratory distress. She has no wheezes. She has no rales. She exhibits no tenderness.   Musculoskeletal: Normal range of motion. She exhibits edema (Trace edema bilateral lower extremities.).   Neurological: She is alert and oriented to person, place, and time.   Skin: Skin is warm and dry. No rash noted. She is " not diaphoretic. No erythema. No pallor.   Psychiatric: She has a normal mood and affect. Her behavior is normal.   Nursing note and vitals reviewed.        ECG 12 Lead  Date/Time: 1/5/2018 8:57 AM  Performed by: DG ECHEVARRIA  Authorized by: DG ECHEVARRIA   Comparison: compared with previous ECG   Rhythm: sinus rhythm  T flattening: V2  Other findings: PRWP  Q waves: III  Clinical impression: non-specific ECG  Comments: Sinus rhythm with chronic Q waves in lead 3 and chronic poor R-wave progression.  No significant change compared to previous EKG in September 2017.  QTc 417.           Transthoracic echocardiogram 7/17/17  · Normal left ventricular cavity size noted. All left ventricular wall segments contract normally. Left ventricular wall thickness is consistent with mild concentric hypertrophy.  · Estimated EF appears to be in the range of 61 - 65%.  · The aortic valve is structurally normal. No aortic valve regurgitation is present. No aortic valve stenosis is present.  · The mitral valve is normal in structure. No mitral valve regurgitation is present. No significant mitral valve stenosis is present.  · The tricuspid valve is normal. No tricuspid valve stenosis is present. No tricuspid valve regurgitation is present. Estimated right ventricular systolic pressure from tricuspid regurgitation is normal (<35 mmHg).  · The pulmonic valve is structurally normal. There is no significant pulmonic valve stenosis present. There is no pulmonic valve regurgitation present.  · There is no evidence of pericardial effusion.      Nuclear stress test 7/19/17  · Findings consistent with a normal ECG stress test.  · Left ventricular ejection fraction is hyperdynamic (Calculated EF > 70%).  · Myocardial perfusion imaging indicates a normal myocardial perfusion study with no evidence of ischemia.  · Impressions are consistent with a low risk study.  Assessment:        Diagnosis Plan   1. Palpitations  Holter  Monitor - 48 Hour   2. Precordial pain     3. Paroxysmal atrial fibrillation  Holter Monitor - 48 Hour   4. Long term current use of anticoagulant     5. Essential hypertension      Recently uncontrolled.   6. Dyslipidemia          Plan:       1. Increase metoprolol tartrate to 100 mg twice daily.  2. Change hydrochlorothiazide from 25 mg when necessary to 12.5 mg daily and continue current potassium supplement.  3. Add isosorbide mononitrate 30 mg daily.  4. We'll evaluate her palpitations with a 48 hour Holter monitor.  5. If continued or increasing chest pains, may have to consider repeat cardiac catheterization, however, at this time her symptoms are mostly atypical for angina and she had a recent negative nuclear stress test.  Her BMI is too high for a CT coronary angiogram.  6. I have encouraged her to follow-up with her endocrinologist for levothyroxine adjustments as she is having weight gain, constipation, and hair loss.  7. Follow-up in 1-2 months or sooner if needed.    No Follow-up on file.    I appreciate the opportunity to participate in this patient's cardiovascular care.    Best Regards,    Elizabeth Arteaga PA-C

## 2018-01-08 ENCOUNTER — TELEPHONE (OUTPATIENT)
Dept: CARDIOLOGY | Facility: CLINIC | Age: 53
End: 2018-01-08

## 2018-01-08 NOTE — TELEPHONE ENCOUNTER
Patient called stated on Friday you started her on low does Imdur and it has given her severe headaches and she states she can not take this medication and would like to know what to do?

## 2018-01-10 ENCOUNTER — TELEPHONE (OUTPATIENT)
Dept: CARDIOLOGY | Facility: CLINIC | Age: 53
End: 2018-01-10

## 2018-01-10 NOTE — TELEPHONE ENCOUNTER
Called pt and advised her that it wasn't ready yet to call back on Friday and see if it is ready. She expressed understanding.

## 2018-01-12 ENCOUNTER — TELEPHONE (OUTPATIENT)
Dept: CARDIOLOGY | Facility: CLINIC | Age: 53
End: 2018-01-12

## 2018-01-12 NOTE — TELEPHONE ENCOUNTER
Pt called requesting her Holter results.       Notes Recorded by LEVI Banerjee on 1/12/2018 at 10:34 AM  Occasional extra beats. Complaints of palpitations, dizziness, chest pain, and shortness of breath were reported during the monitoring period which did not correlate with any EKG changes or arrythmias.      Called pt and advised her. She expressed understanding. Pt also wanted to know if you still wanted her to start the Imdur.

## 2018-01-12 NOTE — TELEPHONE ENCOUNTER
I believe she had bad headaches with it so will hold off on it for now. Continue the other medication adjustments we made.

## 2018-02-23 ENCOUNTER — TRANSCRIBE ORDERS (OUTPATIENT)
Dept: ADMINISTRATIVE | Facility: HOSPITAL | Age: 53
End: 2018-02-23

## 2018-02-23 ENCOUNTER — HOSPITAL ENCOUNTER (EMERGENCY)
Facility: HOSPITAL | Age: 53
Discharge: HOME | End: 2018-02-23
Attending: EMERGENCY MEDICINE | Admitting: EMERGENCY MEDICINE

## 2018-02-23 ENCOUNTER — HOSPITAL ENCOUNTER (OUTPATIENT)
Dept: GENERAL RADIOLOGY | Facility: HOSPITAL | Age: 53
Discharge: HOME | End: 2018-02-23
Admitting: PHYSICIAN ASSISTANT

## 2018-02-23 VITALS
WEIGHT: 270 LBS | HEIGHT: 62 IN | DIASTOLIC BLOOD PRESSURE: 95 MMHG | TEMPERATURE: 98.4 F | SYSTOLIC BLOOD PRESSURE: 179 MMHG | BODY MASS INDEX: 49.69 KG/M2 | OXYGEN SATURATION: 99 % | HEART RATE: 72 BPM | RESPIRATION RATE: 20 BRPM

## 2018-02-23 DIAGNOSIS — M54.9 DORSALGIA: ICD-10-CM

## 2018-02-23 DIAGNOSIS — M25.559 ARTHRALGIA OF HIP, UNSPECIFIED LATERALITY: ICD-10-CM

## 2018-02-23 DIAGNOSIS — M54.42 ACUTE BILATERAL LOW BACK PAIN WITH LEFT-SIDED SCIATICA: Primary | ICD-10-CM

## 2018-02-23 DIAGNOSIS — M25.559 ARTHRALGIA OF HIP, UNSPECIFIED LATERALITY: Primary | ICD-10-CM

## 2018-02-23 LAB
6-ACETYL MORPHINE: NEGATIVE
AMPHET+METHAMPHET UR QL: NEGATIVE
BACTERIA UR QL AUTO: ABNORMAL /HPF
BARBITURATES UR QL SCN: NEGATIVE
BENZODIAZ UR QL SCN: NEGATIVE
BILIRUB UR QL STRIP: NEGATIVE
BUPRENORPHINE SERPL-MCNC: NEGATIVE NG/ML
CANNABINOIDS SERPL QL: NEGATIVE
CLARITY UR: CLEAR
COCAINE UR QL: NEGATIVE
COLOR UR: YELLOW
GLUCOSE UR STRIP-MCNC: NEGATIVE MG/DL
HGB UR QL STRIP.AUTO: NEGATIVE
HYALINE CASTS UR QL AUTO: ABNORMAL /LPF
KETONES UR QL STRIP: NEGATIVE
LEUKOCYTE ESTERASE UR QL STRIP.AUTO: ABNORMAL
METHADONE UR QL SCN: NEGATIVE
NITRITE UR QL STRIP: NEGATIVE
OPIATES UR QL: POSITIVE
OXYCODONE UR QL SCN: NEGATIVE
PCP UR QL SCN: NEGATIVE
PH UR STRIP.AUTO: 7 [PH] (ref 5–8)
PROT UR QL STRIP: NEGATIVE
RBC # UR: ABNORMAL /HPF
REF LAB TEST METHOD: ABNORMAL
SP GR UR STRIP: 1.01 (ref 1–1.03)
SQUAMOUS #/AREA URNS HPF: ABNORMAL /HPF
UROBILINOGEN UR QL STRIP: ABNORMAL
WBC UR QL AUTO: ABNORMAL /HPF

## 2018-02-23 PROCEDURE — 99283 EMERGENCY DEPT VISIT LOW MDM: CPT

## 2018-02-23 PROCEDURE — 81001 URINALYSIS AUTO W/SCOPE: CPT | Performed by: NURSE PRACTITIONER

## 2018-02-23 PROCEDURE — 80307 DRUG TEST PRSMV CHEM ANLYZR: CPT | Performed by: NURSE PRACTITIONER

## 2018-02-23 PROCEDURE — 72110 X-RAY EXAM L-2 SPINE 4/>VWS: CPT

## 2018-02-23 PROCEDURE — 25010000002 ORPHENADRINE CITRATE PER 60 MG: Performed by: NURSE PRACTITIONER

## 2018-02-23 PROCEDURE — 73502 X-RAY EXAM HIP UNI 2-3 VIEWS: CPT | Performed by: RADIOLOGY

## 2018-02-23 PROCEDURE — 96372 THER/PROPH/DIAG INJ SC/IM: CPT

## 2018-02-23 PROCEDURE — 73501 X-RAY EXAM HIP UNI 1 VIEW: CPT

## 2018-02-23 PROCEDURE — 25010000002 KETOROLAC TROMETHAMINE PER 15 MG: Performed by: NURSE PRACTITIONER

## 2018-02-23 PROCEDURE — 72110 X-RAY EXAM L-2 SPINE 4/>VWS: CPT | Performed by: RADIOLOGY

## 2018-02-23 PROCEDURE — 25010000002 METHYLPREDNISOLONE PER 80 MG: Performed by: NURSE PRACTITIONER

## 2018-02-23 RX ORDER — METHYLPREDNISOLONE ACETATE 80 MG/ML
120 INJECTION, SUSPENSION INTRA-ARTICULAR; INTRALESIONAL; INTRAMUSCULAR; SOFT TISSUE ONCE
Status: COMPLETED | OUTPATIENT
Start: 2018-02-23 | End: 2018-02-23

## 2018-02-23 RX ORDER — CYCLOBENZAPRINE HCL 10 MG
10 TABLET ORAL 3 TIMES DAILY PRN
Qty: 12 TABLET | Refills: 0 | Status: SHIPPED | OUTPATIENT
Start: 2018-02-23 | End: 2020-09-01 | Stop reason: ALTCHOICE

## 2018-02-23 RX ORDER — NITROFURANTOIN 25; 75 MG/1; MG/1
100 CAPSULE ORAL 2 TIMES DAILY
Qty: 14 CAPSULE | Refills: 0 | Status: SHIPPED | OUTPATIENT
Start: 2018-02-23 | End: 2018-03-02

## 2018-02-23 RX ORDER — KETOROLAC TROMETHAMINE 30 MG/ML
30 INJECTION, SOLUTION INTRAMUSCULAR; INTRAVENOUS ONCE
Status: COMPLETED | OUTPATIENT
Start: 2018-02-23 | End: 2018-02-23

## 2018-02-23 RX ORDER — ORPHENADRINE CITRATE 30 MG/ML
60 INJECTION INTRAMUSCULAR; INTRAVENOUS ONCE
Status: COMPLETED | OUTPATIENT
Start: 2018-02-23 | End: 2018-02-23

## 2018-02-23 RX ADMIN — KETOROLAC TROMETHAMINE 30 MG: 30 INJECTION, SOLUTION INTRAMUSCULAR; INTRAVENOUS at 16:52

## 2018-02-23 RX ADMIN — METHYLPREDNISOLONE ACETATE 120 MG: 80 INJECTION, SUSPENSION INTRA-ARTICULAR; INTRALESIONAL; INTRAMUSCULAR; SOFT TISSUE at 16:54

## 2018-02-23 RX ADMIN — ORPHENADRINE CITRATE 60 MG: 30 INJECTION INTRAMUSCULAR; INTRAVENOUS at 16:53

## 2018-02-23 NOTE — DISCHARGE INSTRUCTIONS
"    Hypertension  Hypertension, commonly called high blood pressure, is when the force of blood pumping through the arteries is too strong. The arteries are the blood vessels that carry blood from the heart throughout the body. Hypertension forces the heart to work harder to pump blood and may cause arteries to become narrow or stiff. Having untreated or uncontrolled hypertension can cause heart attacks, strokes, kidney disease, and other problems.  A blood pressure reading consists of a higher number over a lower number. Ideally, your blood pressure should be below 120/80. The first (\"top\") number is called the systolic pressure. It is a measure of the pressure in your arteries as your heart beats. The second (\"bottom\") number is called the diastolic pressure. It is a measure of the pressure in your arteries as the heart relaxes.  What are the causes?  The cause of this condition is not known.  What increases the risk?  Some risk factors for high blood pressure are under your control. Others are not.  Factors you can change   · Smoking.  · Having type 2 diabetes mellitus, high cholesterol, or both.  · Not getting enough exercise or physical activity.  · Being overweight.  · Having too much fat, sugar, calories, or salt (sodium) in your diet.  · Drinking too much alcohol.  Factors that are difficult or impossible to change   · Having chronic kidney disease.  · Having a family history of high blood pressure.  · Age. Risk increases with age.  · Race. You may be at higher risk if you are -American.  · Gender. Men are at higher risk than women before age 45. After age 65, women are at higher risk than men.  · Having obstructive sleep apnea.  · Stress.  What are the signs or symptoms?  Extremely high blood pressure (hypertensive crisis) may cause:  · Headache.  · Anxiety.  · Shortness of breath.  · Nosebleed.  · Nausea and vomiting.  · Severe chest pain.  · Jerky movements you cannot control (seizures).  How is " this diagnosed?  This condition is diagnosed by measuring your blood pressure while you are seated, with your arm resting on a surface. The cuff of the blood pressure monitor will be placed directly against the skin of your upper arm at the level of your heart. It should be measured at least twice using the same arm. Certain conditions can cause a difference in blood pressure between your right and left arms.  Certain factors can cause blood pressure readings to be lower or higher than normal (elevated) for a short period of time:  · When your blood pressure is higher when you are in a health care provider's office than when you are at home, this is called white coat hypertension. Most people with this condition do not need medicines.  · When your blood pressure is higher at home than when you are in a health care provider's office, this is called masked hypertension. Most people with this condition may need medicines to control blood pressure.  If you have a high blood pressure reading during one visit or you have normal blood pressure with other risk factors:  · You may be asked to return on a different day to have your blood pressure checked again.  · You may be asked to monitor your blood pressure at home for 1 week or longer.  If you are diagnosed with hypertension, you may have other blood or imaging tests to help your health care provider understand your overall risk for other conditions.  How is this treated?  This condition is treated by making healthy lifestyle changes, such as eating healthy foods, exercising more, and reducing your alcohol intake. Your health care provider may prescribe medicine if lifestyle changes are not enough to get your blood pressure under control, and if:  · Your systolic blood pressure is above 130.  · Your diastolic blood pressure is above 80.  Your personal target blood pressure may vary depending on your medical conditions, your age, and other factors.  Follow these  instructions at home:  Eating and drinking   · Eat a diet that is high in fiber and potassium, and low in sodium, added sugar, and fat. An example eating plan is called the DASH (Dietary Approaches to Stop Hypertension) diet. To eat this way:  ¨ Eat plenty of fresh fruits and vegetables. Try to fill half of your plate at each meal with fruits and vegetables.  ¨ Eat whole grains, such as whole wheat pasta, brown rice, or whole grain bread. Fill about one quarter of your plate with whole grains.  ¨ Eat or drink low-fat dairy products, such as skim milk or low-fat yogurt.  ¨ Avoid fatty cuts of meat, processed or cured meats, and poultry with skin. Fill about one quarter of your plate with lean proteins, such as fish, chicken without skin, beans, eggs, and tofu.  ¨ Avoid premade and processed foods. These tend to be higher in sodium, added sugar, and fat.  · Reduce your daily sodium intake. Most people with hypertension should eat less than 1,500 mg of sodium a day.  · Limit alcohol intake to no more than 1 drink a day for nonpregnant women and 2 drinks a day for men. One drink equals 12 oz of beer, 5 oz of wine, or 1½ oz of hard liquor.  Lifestyle   · Work with your health care provider to maintain a healthy body weight or to lose weight. Ask what an ideal weight is for you.  · Get at least 30 minutes of exercise that causes your heart to beat faster (aerobic exercise) most days of the week. Activities may include walking, swimming, or biking.  · Include exercise to strengthen your muscles (resistance exercise), such as pilates or lifting weights, as part of your weekly exercise routine. Try to do these types of exercises for 30 minutes at least 3 days a week.  · Do not use any products that contain nicotine or tobacco, such as cigarettes and e-cigarettes. If you need help quitting, ask your health care provider.  · Monitor your blood pressure at home as told by your health care provider.  · Keep all follow-up visits  as told by your health care provider. This is important.  Medicines   · Take over-the-counter and prescription medicines only as told by your health care provider. Follow directions carefully. Blood pressure medicines must be taken as prescribed.  · Do not skip doses of blood pressure medicine. Doing this puts you at risk for problems and can make the medicine less effective.  · Ask your health care provider about side effects or reactions to medicines that you should watch for.  Contact a health care provider if:  · You think you are having a reaction to a medicine you are taking.  · You have headaches that keep coming back (recurring).  · You feel dizzy.  · You have swelling in your ankles.  · You have trouble with your vision.  Get help right away if:  · You develop a severe headache or confusion.  · You have unusual weakness or numbness.  · You feel faint.  · You have severe pain in your chest or abdomen.  · You vomit repeatedly.  · You have trouble breathing.  Summary  · Hypertension is when the force of blood pumping through your arteries is too strong. If this condition is not controlled, it may put you at risk for serious complications.  · Your personal target blood pressure may vary depending on your medical conditions, your age, and other factors. For most people, a normal blood pressure is less than 120/80.  · Hypertension is treated with lifestyle changes, medicines, or a combination of both. Lifestyle changes include weight loss, eating a healthy, low-sodium diet, exercising more, and limiting alcohol.  This information is not intended to replace advice given to you by your health care provider. Make sure you discuss any questions you have with your health care provider.  Document Released: 12/18/2006 Document Revised: 11/15/2017 Document Reviewed: 11/15/2017  [employer] Interactive Patient Education © 2017 [employer]

## 2018-02-24 NOTE — ED PROVIDER NOTES
Subjective   Patient is a 52 y.o. female presenting with back pain.   History provided by:  Patient  Back Pain   Location:  Lumbar spine  Quality:  Stabbing and shooting  Radiates to:  Does not radiate  Pain severity:  Moderate  Pain is:  Same all the time  Onset quality:  Sudden  Timing:  Constant  Progression:  Worsening  Chronicity:  New  Relieved by:  None tried  Worsened by:  Nothing  Ineffective treatments:  None tried  Associated symptoms: no abdominal pain, no bladder incontinence, no bowel incontinence, no chest pain, no dysuria, no fever, no numbness, no paresthesias and no perianal numbness        Review of Systems   Constitutional: Negative.  Negative for fever.   HENT: Negative.    Respiratory: Negative.    Cardiovascular: Negative.  Negative for chest pain.   Gastrointestinal: Negative.  Negative for abdominal pain and bowel incontinence.   Endocrine: Negative.    Genitourinary: Negative.  Negative for bladder incontinence and dysuria.   Musculoskeletal: Positive for back pain.   Skin: Negative.    Neurological: Negative.  Negative for numbness and paresthesias.   Psychiatric/Behavioral: Negative.    All other systems reviewed and are negative.      Past Medical History:   Diagnosis Date   • Acid reflux    • Allergic    • Anxiety    • Asthma    • Atrial fibrillation    • Diastolic dysfunction     Grade I   • Diverticula of colon    • Endometriosis    • Gout    • Hyperlipidemia    • Hypertension    • Hyperthyroidism    • Morbid obesity    • Panic attack    • RA (rheumatoid arthritis)    • Sleep apnea     C-PAP SETTING OF 17, ONLY USES WHEN NOT SLEEPING IN RECLINER    • Spinal headache    • Vitamin D deficiency    • Wears glasses        Allergies   Allergen Reactions   • Lorcet [Hydrocodone-Acetaminophen] Other (See Comments)     Rash that bleeds         Past Surgical History:   Procedure Laterality Date   •  SECTION      x2   • COLONOSCOPY     • FOOT SURGERY Left     ASHLEY NEUROMA, BONE SPUR  AND ACHILLES REPAIR AND PLANTAR FASCITITS    • GASTRIC SLEEVE LAPAROSCOPIC      2 years ago   • HARDWARE REMOVAL FOOT / ANKLE Left    • HYSTERECTOMY      BSO    • JOINT REPLACEMENT     • KNEE SURGERY Left     2 (1 was partial knee replacement)   • NOSE SURGERY      DEVIATED SEPTUM AND SWOLLEN TURBINATES   • SKIN BIOPSY     • THYROIDECTOMY N/A 11/16/2017    Procedure: TOTAL THYROIDECTOMY;  Surgeon: Lencho Kay MD;  Location: [facility];  Service:    • TOOTH EXTRACTION     • WISDOM TOOTH EXTRACTION         Family History   Problem Relation Age of Onset   • No Known Problems Mother    • No Known Problems Father        Social History     Social History   • Marital status:      Spouse name: N/A   • Number of children: N/A   • Years of education: N/A     Social History Main Topics   • Smoking status: Never Smoker   • Smokeless tobacco: Never Used   • Alcohol use No      Comment: !x yearly   • Drug use: No   • Sexual activity: Defer      Comment: Hysterectomy     Other Topics Concern   • None     Social History Narrative           Objective   Physical Exam   Constitutional: She is oriented to person, place, and time. She appears well-developed and well-nourished. No distress.   HENT:   Head: Normocephalic and atraumatic.   Right Ear: External ear normal.   Left Ear: External ear normal.   Nose: Nose normal.   Eyes: Conjunctivae and EOM are normal. Pupils are equal, round, and reactive to light.   Neck: Normal range of motion. Neck supple. No JVD present. No tracheal deviation present.   Cardiovascular: Normal rate, regular rhythm and normal heart sounds.    No murmur heard.  Pulmonary/Chest: Effort normal and breath sounds normal. No respiratory distress. She has no wheezes.   Abdominal: Soft. Bowel sounds are normal. There is no tenderness.   Musculoskeletal: Normal range of motion. She exhibits no edema or deformity.        Lumbar back: She exhibits normal range of motion, no tenderness and no bony tenderness.    Neurological: She is alert and oriented to person, place, and time. No cranial nerve deficit.   Skin: Skin is warm and dry. No rash noted. She is not diaphoretic. No erythema. No pallor.   Psychiatric: She has a normal mood and affect. Her behavior is normal. Thought content normal.   Nursing note and vitals reviewed.      Procedures         ED Course  ED Course                  MDM  Number of Diagnoses or Management Options  Acute bilateral low back pain with left-sided sciatica: established and worsening     Amount and/or Complexity of Data Reviewed  Clinical lab tests: reviewed    Risk of Complications, Morbidity, and/or Mortality  Presenting problems: low  Diagnostic procedures: low  Management options: low    Patient Progress  Patient progress: stable      Final diagnoses:   Acute bilateral low back pain with left-sided sciatica            Fernanda Ann, APRN  02/23/18 1942

## 2018-03-09 ENCOUNTER — TELEPHONE (OUTPATIENT)
Dept: CARDIOLOGY | Facility: CLINIC | Age: 53
End: 2018-03-09

## 2018-03-09 NOTE — TELEPHONE ENCOUNTER
Called patient back advised her that we would not be able to give her a clearance. I offered her the appointment for 3.16.18 She stated she did not want that appointment that she would just some in on 3.26.18. Patient expressed verbal understanding.

## 2018-03-09 NOTE — TELEPHONE ENCOUNTER
Patient called wanting a surgery clearance for a left foot surgery with Dr. Knowles on 3.14.18. I explained to patient she is on a blood thinner and I would contact a provider and ask and see what they could do. I did advise her based on her last office visit that she was having chest pain and hypertension and has not had a follow-up to reevaluate her.     I called Elizabeth Arteaga PA I explained to her that patient is wanting to have surgery and is wanting a clearance for left foot surgery on 3.14.18 by Dr. Knowles.     Per Elizabeth she stated she can not clear patient due to chest pain and hypertension at last office visit. Patient had several medication changes and test and has not been in office to follow-up and to be evaluated. Elizabeth Arteaga stated to make her a sooner on 3/16/18 for patient to be seen by her which is the soonest she has available.

## 2018-03-26 ENCOUNTER — OFFICE VISIT (OUTPATIENT)
Dept: CARDIOLOGY | Facility: CLINIC | Age: 53
End: 2018-03-26

## 2018-03-26 VITALS
BODY MASS INDEX: 48.03 KG/M2 | SYSTOLIC BLOOD PRESSURE: 106 MMHG | HEIGHT: 62 IN | WEIGHT: 261 LBS | RESPIRATION RATE: 16 BRPM | DIASTOLIC BLOOD PRESSURE: 72 MMHG | HEART RATE: 69 BPM

## 2018-03-26 DIAGNOSIS — I10 ESSENTIAL HYPERTENSION: ICD-10-CM

## 2018-03-26 DIAGNOSIS — I48.0 PAROXYSMAL ATRIAL FIBRILLATION (HCC): Primary | ICD-10-CM

## 2018-03-26 PROCEDURE — 93000 ELECTROCARDIOGRAM COMPLETE: CPT | Performed by: PHYSICIAN ASSISTANT

## 2018-03-26 PROCEDURE — 99214 OFFICE O/P EST MOD 30 MIN: CPT | Performed by: PHYSICIAN ASSISTANT

## 2018-03-26 RX ORDER — ACETAMINOPHEN AND CODEINE PHOSPHATE 300; 30 MG/1; MG/1
1 TABLET ORAL EVERY 6 HOURS PRN
COMMUNITY

## 2018-03-26 NOTE — PROGRESS NOTES
" Jazzmine Zapata PA-C  Ondina Hayes  1965 03/26/2018    Patient Active Problem List   Diagnosis   • Rapid atrial fibrillation   • Paroxysmal atrial fibrillation   • Hypertension       Dear Jazzmine Zapata PA-C:    Chief Complaint   Patient presents with   • Follow-up     2 mos, A fib   • Surgical Clearance     foot surg., 04/11/2018, Dr. Knowles   • Chest Pain     rated 4, sharp, pressure   • Palpitations     flutters   • Shortness of Breath     \"better\"   • Edema     feet   • Other     meds, list provided       Subjective Ms. Wong is a pleasant 52-year-old  female with history of paroxysmal atrial fibrillation, has been on Xarelto for stroke prevention is here seeking preoperative cardiovascular evaluation and risk assessment and cardiac clearance prior to undergoing foot surgery.  She has occasional sharp chest pains.  She says her breathing is better. She has no history of known coronary artery disease or congestive heart failure.  She had a negative nuclear stress test in July 2017 and normal echo Doppler study.    Ondina Hayes is a 52 y.o. female with a past medical history significant for      Current Outpatient Prescriptions:   •  acetaminophen-codeine (TYLENOL #3) 300-30 MG per tablet, Take 1 tablet by mouth Every 6 (Six) Hours As Needed for Moderate Pain ., Disp: , Rfl:   •  albuterol (PROVENTIL HFA;VENTOLIN HFA) 108 (90 BASE) MCG/ACT inhaler, Inhale 2 puffs As Needed for Wheezing., Disp: , Rfl:   •  amitriptyline (ELAVIL) 100 MG tablet, Take 100 mg by mouth Every Evening., Disp: , Rfl:   •  calcium carbonate (TUMS) 500 MG chewable tablet, Chew 1,500 mg 4 (Four) Times a Day., Disp: 500 tablet, Rfl: 1  •  cyclobenzaprine (FLEXERIL) 10 MG tablet, Take 1 tablet by mouth 3 (Three) Times a Day As Needed for Muscle Spasms., Disp: 12 tablet, Rfl: 0  •  hydrochlorothiazide (HYDRODIURIL) 12.5 MG tablet, Take 1 tablet by mouth Daily., Disp: 30 tablet, Rfl: 3  •  levothyroxine (SYNTHROID) 125 MCG " tablet, Take 1 tablet by mouth Daily., Disp: 30 tablet, Rfl: 1  •  metoprolol tartrate (LOPRESSOR) 100 MG tablet, Take 1 tablet by mouth Every 12 (Twelve) Hours., Disp: 60 tablet, Rfl: 3  •  montelukast (SINGULAIR) 10 MG tablet, Take 10 mg by mouth Every Morning., Disp: , Rfl:   •  multivitamin (DAILY ARACELI) tablet tablet, Take 1 tablet by mouth Daily., Disp: , Rfl:   •  omeprazole (priLOSEC) 20 MG capsule, Take 20 mg by mouth Daily As Needed., Disp: , Rfl:   •  potassium chloride (MICRO-K) 10 MEQ CR capsule, Take 10 mEq by mouth Daily As Needed., Disp: , Rfl:   •  pramipexole (MIRAPEX) 1 MG tablet, Take 1 mg by mouth Every Night. Doctors office states to take 1 mg twice daily, patient states that she takes 0.5 mg in the morning and 1 mg at night., Disp: , Rfl:   •  pravastatin (PRAVACHOL) 10 MG tablet, Take 10 mg by mouth Every Night., Disp: , Rfl:   •  ranitidine (ZANTAC) 150 MG capsule, Take 150 mg by mouth. TAKES 150 AT LEAST ONCE SOMETIMES TWICE A DAY, Disp: , Rfl:   •  rivaroxaban (XARELTO) 20 MG tablet, Take 1 tablet by mouth Daily With Dinner. Indications: RESUME TAKING IN 3 DAYS, Disp: 30 tablet, Rfl: 3  •  calcitriol (ROCALTROL) 0.25 MCG capsule, Take 1 capsule by mouth 2 (Two) Times a Day., Disp: 60 capsule, Rfl: 1  •  calcitriol (ROCALTROL) 0.25 MCG capsule, Take 1 capsule by mouth 2 (Two) Times a Day., Disp: 60 capsule, Rfl: 1  •  cephalexin (KEFLEX) 500 MG capsule, Take 1 capsule by mouth 4 (Four) Times a Day., Disp: 28 capsule, Rfl: 0  •  cyclobenzaprine (FLEXERIL) 5 MG tablet, Take 5 mg by mouth 3 (Three) Times a Day As Needed for Muscle Spasms., Disp: , Rfl:   •  isosorbide mononitrate (IMDUR) 30 MG 24 hr tablet, Take 1 tablet by mouth Daily., Disp: 30 tablet, Rfl: 3  •  traMADol (ULTRAM) 50 MG tablet, Take 1 tablet by mouth Every 6 (Six) Hours As Needed for Moderate Pain ., Disp: 40 tablet, Rfl: 0      Social History     Social History   • Marital status:      Spouse name: N/A   • Number of  "children: N/A   • Years of education: N/A     Occupational History   • Not on file.     Social History Main Topics   • Smoking status: Never Smoker   • Smokeless tobacco: Never Used   • Alcohol use No      Comment: !x yearly   • Drug use: No   • Sexual activity: Defer      Comment: Hysterectomy     Other Topics Concern   • Not on file     Social History Narrative   • No narrative on file       Review of Systems   Cardiovascular: Positive for chest pain (has occasional sharp chest pains.), leg swelling and palpitations.   Respiratory: Positive for shortness of breath.        Objective   Blood pressure 106/72, pulse 69, resp. rate 16, height 157.5 cm (62\"), weight 118 kg (261 lb).    Physical Exam      ECG 12 Lead  Date/Time: 3/26/2018 11:22 AM  Performed by: ELIZABETH ECHEVARRIA  Authorized by: ELIZABETH ECHEVARRIA               Assessment:      Diagnosis Plan   1. Paroxysmal atrial fibrillation     2. Essential hypertension        Plan:       1. Since her cardiac status appears to be stable at this time, she is clear for surgery with an acceptable cardiac risk.  2. I told her to stop Xarelto no more than 2 days prior to surgery and then get this restarted after surgery as soon as safe from the surgery standpoint.    No Follow-up on file.    I appreciate the opportunity to participate in this patient's cardiovascular care.    Best Regards,    Elizabeth Echevarria PA-C  "

## 2018-06-07 RX ORDER — HYDROCHLOROTHIAZIDE 12.5 MG/1
TABLET ORAL
Qty: 30 TABLET | Refills: 2 | Status: SHIPPED | OUTPATIENT
Start: 2018-06-07 | End: 2018-12-24 | Stop reason: SDUPTHER

## 2018-06-07 RX ORDER — METOPROLOL TARTRATE 100 MG/1
TABLET ORAL
Qty: 60 TABLET | Refills: 2 | Status: SHIPPED | OUTPATIENT
Start: 2018-06-07 | End: 2018-09-01 | Stop reason: SDUPTHER

## 2018-08-15 RX ORDER — RIVAROXABAN 20 MG/1
TABLET, FILM COATED ORAL
Qty: 30 TABLET | Refills: 2 | Status: SHIPPED | OUTPATIENT
Start: 2018-08-15 | End: 2018-12-10 | Stop reason: SDUPTHER

## 2018-09-04 RX ORDER — METOPROLOL TARTRATE 100 MG/1
TABLET ORAL
Qty: 180 TABLET | Refills: 0 | Status: SHIPPED | OUTPATIENT
Start: 2018-09-04 | End: 2018-12-02 | Stop reason: SDUPTHER

## 2018-09-05 RX ORDER — HYDROCHLOROTHIAZIDE 12.5 MG/1
TABLET ORAL
Qty: 30 TABLET | Refills: 1 | OUTPATIENT
Start: 2018-09-05

## 2018-09-24 ENCOUNTER — OFFICE VISIT (OUTPATIENT)
Dept: CARDIOLOGY | Facility: CLINIC | Age: 53
End: 2018-09-24

## 2018-09-24 VITALS
WEIGHT: 270.2 LBS | SYSTOLIC BLOOD PRESSURE: 116 MMHG | HEART RATE: 73 BPM | RESPIRATION RATE: 16 BRPM | HEIGHT: 62 IN | BODY MASS INDEX: 49.72 KG/M2 | DIASTOLIC BLOOD PRESSURE: 71 MMHG | OXYGEN SATURATION: 95 %

## 2018-09-24 DIAGNOSIS — R55 SYNCOPE, UNSPECIFIED SYNCOPE TYPE: ICD-10-CM

## 2018-09-24 DIAGNOSIS — R07.2 PRECORDIAL PAIN: ICD-10-CM

## 2018-09-24 DIAGNOSIS — R00.2 PALPITATIONS: ICD-10-CM

## 2018-09-24 DIAGNOSIS — I10 ESSENTIAL HYPERTENSION: Primary | ICD-10-CM

## 2018-09-24 DIAGNOSIS — I48.0 PAROXYSMAL ATRIAL FIBRILLATION (HCC): ICD-10-CM

## 2018-09-24 PROCEDURE — 93000 ELECTROCARDIOGRAM COMPLETE: CPT | Performed by: NURSE PRACTITIONER

## 2018-09-24 PROCEDURE — 99214 OFFICE O/P EST MOD 30 MIN: CPT | Performed by: NURSE PRACTITIONER

## 2018-09-24 RX ORDER — ISOSORBIDE DINITRATE 10 MG/1
10 TABLET ORAL 3 TIMES DAILY
Qty: 90 TABLET | Refills: 5 | Status: SHIPPED | OUTPATIENT
Start: 2018-09-24 | End: 2018-11-13 | Stop reason: SINTOL

## 2018-09-24 NOTE — PROGRESS NOTES
Jazzmine Zapata PA-C  Ondina Hayes  1965 09/24/2018    Patient Active Problem List   Diagnosis   • Rapid atrial fibrillation (CMS/HCC)   • Paroxysmal atrial fibrillation (CMS/HCC)   • Hypertension       Dear Jazzmine Zapata PA-C:    Subjective     Chief Complaint   Patient presents with   • Atrial Fibrillation     follow up   • Hypertension           History of Present Illness:    Ondina Hayes is a 52 y.o. female with a history of paroxysmal atrial fibrillation and hypertension. She has a history of overactive thyroid with complete thyroidectomy about one year ago. Since surgery, her palpitations have improved. However, she reports about once every 2 weeks she has a rapid heart rate up to 150 beats per minute. She has also had one episode of syncope associated with the palpitations. She does report her blood pressure has been very well controlled over the past several months. She does occasionally have mid sternal chest pain which has been chronic. This does not radiate. Occasionally this is associated with palpitations. She cannot identify any aggravating factors. She did take a nitroglycerin on one occasion which helped to alleviate her symptoms. She has reportedly had 2 cardiac catheterizations in the past which were normal per her report. A stress test and echocardiogram in July of 2017 were normal. She was prescribed Imdur in the past but this caused severe headache, so she discontinued.           No Known Allergies:      Current Outpatient Prescriptions:   •  acetaminophen-codeine (TYLENOL #3) 300-30 MG per tablet, Take 1 tablet by mouth Every 6 (Six) Hours As Needed for Moderate Pain ., Disp: , Rfl:   •  albuterol (PROVENTIL HFA;VENTOLIN HFA) 108 (90 BASE) MCG/ACT inhaler, Inhale 2 puffs As Needed for Wheezing., Disp: , Rfl:   •  amitriptyline (ELAVIL) 100 MG tablet, Take 100 mg by mouth Every Evening., Disp: , Rfl:   •  hydrochlorothiazide (HYDRODIURIL) 12.5 MG tablet, TAKE ONE TABLET BY MOUTH  DAILY, Disp: 30 tablet, Rfl: 2  •  levothyroxine (SYNTHROID) 125 MCG tablet, Take 1 tablet by mouth Daily., Disp: 30 tablet, Rfl: 1  •  metoprolol tartrate (LOPRESSOR) 100 MG tablet, TAKE ONE TABLET BY MOUTH EVERY 12 HOURS, Disp: 180 tablet, Rfl: 0  •  montelukast (SINGULAIR) 10 MG tablet, Take 10 mg by mouth Every Morning., Disp: , Rfl:   •  omeprazole (priLOSEC) 20 MG capsule, Take 20 mg by mouth Daily As Needed., Disp: , Rfl:   •  potassium chloride (MICRO-K) 10 MEQ CR capsule, Take 10 mEq by mouth Daily As Needed., Disp: , Rfl:   •  pramipexole (MIRAPEX) 1 MG tablet, Take 1 mg by mouth Every Night. Doctors office states to take 1 mg twice daily, patient states that she takes 0.5 mg in the morning and 1 mg at night., Disp: , Rfl:   •  pravastatin (PRAVACHOL) 10 MG tablet, Take 10 mg by mouth Every Night., Disp: , Rfl:   •  ranitidine (ZANTAC) 150 MG capsule, Take 150 mg by mouth. TAKES 150 AT LEAST ONCE SOMETIMES TWICE A DAY, Disp: , Rfl:   •  XARELTO 20 MG tablet, TAKE ONE TABLET BY MOUTH DAILY WITH DINNER, Disp: 30 tablet, Rfl: 2  •  calcitriol (ROCALTROL) 0.25 MCG capsule, Take 1 capsule by mouth 2 (Two) Times a Day., Disp: 60 capsule, Rfl: 1  •  calcitriol (ROCALTROL) 0.25 MCG capsule, Take 1 capsule by mouth 2 (Two) Times a Day., Disp: 60 capsule, Rfl: 1  •  cyclobenzaprine (FLEXERIL) 10 MG tablet, Take 1 tablet by mouth 3 (Three) Times a Day As Needed for Muscle Spasms., Disp: 12 tablet, Rfl: 0  •  isosorbide dinitrate (ISORDIL) 10 MG tablet, Take 1 tablet by mouth 3 (Three) Times a Day., Disp: 90 tablet, Rfl: 5  •  multivitamin (DAILY ARACELI) tablet tablet, Take 1 tablet by mouth Daily., Disp: , Rfl:       The following portions of the patient's history were reviewed and updated as appropriate: allergies, current medications, past family history, past medical history, past social history, past surgical history and problem list.    Social History   Substance Use Topics   • Smoking status: Never Smoker   •  "Smokeless tobacco: Never Used   • Alcohol use No      Comment: !x yearly       Review of Systems   Constitution: Negative for malaise/fatigue.   Cardiovascular: Positive for chest pain, leg swelling, palpitations and syncope.   Respiratory: Negative for cough, shortness of breath and wheezing.    Gastrointestinal: Negative for abdominal pain, nausea and vomiting.   Neurological: Positive for dizziness. Negative for light-headedness.       Objective   Vitals:    09/24/18 0811   BP: 116/71   BP Location: Right arm   Pulse: 73   Resp: 16   SpO2: 95%   Weight: 123 kg (270 lb 3.2 oz)   Height: 157.5 cm (62.01\")     Body mass index is 49.41 kg/m².        Physical Exam   Constitutional: She is oriented to person, place, and time. She appears well-developed and well-nourished.   Obese     HENT:   Head: Normocephalic and atraumatic.   Cardiovascular: Normal rate, regular rhythm and normal heart sounds.  Exam reveals no S3 and no S4.    No murmur heard.  Pulmonary/Chest: Effort normal and breath sounds normal. She has no wheezes. She has no rales.   Abdominal: Soft. Bowel sounds are normal.   Musculoskeletal: She exhibits edema (trace BLE edema).   Neurological: She is alert and oriented to person, place, and time.   Skin: Skin is warm and dry.   Psychiatric: She has a normal mood and affect. Her behavior is normal.           ECG 12 Lead  Date/Time: 9/24/2018 8:16 AM  Performed by: DAMARIS MORRIS  Authorized by: DAMARIS MORRIS   Comparison: compared with previous ECG   Similar to previous ECG  Rhythm: sinus rhythm  BPM: 68                Assessment/Plan    Diagnosis Plan   1. Essential hypertension     2. Paroxysmal atrial fibrillation (CMS/HCC)     3. Syncope, unspecified syncope type  Cardiac Event Monitor   4. Palpitations  Cardiac Event Monitor   5. Precordial pain  isosorbide dinitrate (ISORDIL) 10 MG tablet                Recommendations:    1. Since 48 hour holter monitor was unremarkable and palpitations only " occur once every 1-2 weeks, will order a 30 day event monitor to evaluate further.    2. Since she could not tolerate Imdur for the chest pains, will start isordil 10 mg TID. I have asked her to monitor her blood pressure at home. If she becomes hypotensive, would discontinue hydrochlorothiazide at that time.    3. Continue metoprolol, Xarelto, and pravastatin    4. Follow up in 6 weeks    Return in about 6 weeks (around 11/5/2018) for Recheck.    As always, I appreciate very much the opportunity to participate in the cardiovascular care of your patients.      With Best Regards,    Lissette Denis, NHAN

## 2018-09-25 ENCOUNTER — TELEPHONE (OUTPATIENT)
Dept: CARDIOLOGY | Facility: CLINIC | Age: 53
End: 2018-09-25

## 2018-09-25 NOTE — TELEPHONE ENCOUNTER
Pt called and stated that she has had really bad headaches with the Imdur and that it looks like a blood vessel has busted in eye. She took it 2 times yesterday and has had one today.

## 2018-10-17 ENCOUNTER — TELEPHONE (OUTPATIENT)
Dept: CARDIOLOGY | Facility: CLINIC | Age: 53
End: 2018-10-17

## 2018-10-29 NOTE — TELEPHONE ENCOUNTER
Spoke with patients pharmacy they stated with her insurance and copay card it is only $10. Called patient advised her of this. She stated they tried to charge 99.48 last week. Advised patient if she had any trouble with this please let me know and I will call them back.

## 2018-11-13 ENCOUNTER — OFFICE VISIT (OUTPATIENT)
Dept: CARDIOLOGY | Facility: CLINIC | Age: 53
End: 2018-11-13

## 2018-11-13 VITALS
OXYGEN SATURATION: 99 % | BODY MASS INDEX: 49.87 KG/M2 | HEART RATE: 87 BPM | SYSTOLIC BLOOD PRESSURE: 153 MMHG | DIASTOLIC BLOOD PRESSURE: 89 MMHG | WEIGHT: 271 LBS | HEIGHT: 62 IN

## 2018-11-13 DIAGNOSIS — E78.5 DYSLIPIDEMIA: ICD-10-CM

## 2018-11-13 DIAGNOSIS — I48.0 PAROXYSMAL ATRIAL FIBRILLATION (HCC): Primary | ICD-10-CM

## 2018-11-13 DIAGNOSIS — R00.2 PALPITATIONS: ICD-10-CM

## 2018-11-13 DIAGNOSIS — I10 ESSENTIAL HYPERTENSION: ICD-10-CM

## 2018-11-13 PROCEDURE — 99213 OFFICE O/P EST LOW 20 MIN: CPT | Performed by: NURSE PRACTITIONER

## 2018-11-13 RX ORDER — AMLODIPINE BESYLATE 5 MG/1
5 TABLET ORAL DAILY
Qty: 30 TABLET | Refills: 5 | Status: SHIPPED | OUTPATIENT
Start: 2018-11-13 | End: 2019-08-28 | Stop reason: SDUPTHER

## 2018-11-13 RX ORDER — COLCHICINE 0.6 MG/1
0.6 TABLET ORAL 2 TIMES DAILY
COMMUNITY
End: 2020-06-10 | Stop reason: ALTCHOICE

## 2018-11-13 RX ORDER — FEBUXOSTAT 40 MG/1
40 TABLET, FILM COATED ORAL DAILY
COMMUNITY
End: 2020-09-01 | Stop reason: ALTCHOICE

## 2018-11-13 NOTE — PROGRESS NOTES
Jazzmine Zapata PA-C  Ondina Hayes  1965 11/13/2018    Patient Active Problem List   Diagnosis   • Rapid atrial fibrillation (CMS/HCC)   • Paroxysmal atrial fibrillation (CMS/HCC)   • Hypertension       Dear Jazzmine Zapata PA-C:    Subjective     Chief Complaint   Patient presents with   • Follow-up   • Med Management     Verbal med list.    • Palpitations     Flutters sometimes.    • Results     Event monitor.            History of Present Illness:    Ondina Hayes is a 52 y.o. female with a past medical history significant for paroxysmal atrial fibrillation and hypertension. She previously presented with palpitations and chest pains. She also reported a prior episode of syncope. This was evaluated further with a 30 day event monitor this revealed predominately sinus rhythm with occasional sinus tachycardia. She reports her symptoms have mostly resolved. She occasionally has muscle spasm in her left axilla. She denies any palpitations, chest pain, near syncope, or syncope. Her blood pressure has been elevated at home.           No Known Allergies:      Current Outpatient Medications:   •  acetaminophen-codeine (TYLENOL #3) 300-30 MG per tablet, Take 1 tablet by mouth Every 6 (Six) Hours As Needed for Moderate Pain ., Disp: , Rfl:   •  albuterol (PROVENTIL HFA;VENTOLIN HFA) 108 (90 BASE) MCG/ACT inhaler, Inhale 2 puffs As Needed for Wheezing., Disp: , Rfl:   •  amitriptyline (ELAVIL) 100 MG tablet, Take 100 mg by mouth Every Evening., Disp: , Rfl:   •  calcitriol (ROCALTROL) 0.25 MCG capsule, Take 1 capsule by mouth 2 (Two) Times a Day., Disp: 60 capsule, Rfl: 1  •  calcitriol (ROCALTROL) 0.25 MCG capsule, Take 1 capsule by mouth 2 (Two) Times a Day., Disp: 60 capsule, Rfl: 1  •  colchicine 0.6 MG tablet, Take 0.6 mg by mouth 2 (Two) Times a Day., Disp: , Rfl:   •  cyclobenzaprine (FLEXERIL) 10 MG tablet, Take 1 tablet by mouth 3 (Three) Times a Day As Needed for Muscle Spasms., Disp: 12 tablet, Rfl:  0  •  febuxostat (ULORIC) 40 MG tablet, Take 40 mg by mouth Daily., Disp: , Rfl:   •  hydrochlorothiazide (HYDRODIURIL) 12.5 MG tablet, TAKE ONE TABLET BY MOUTH DAILY, Disp: 30 tablet, Rfl: 2  •  levothyroxine (SYNTHROID) 125 MCG tablet, Take 1 tablet by mouth Daily., Disp: 30 tablet, Rfl: 1  •  metoprolol tartrate (LOPRESSOR) 100 MG tablet, TAKE ONE TABLET BY MOUTH EVERY 12 HOURS, Disp: 180 tablet, Rfl: 0  •  montelukast (SINGULAIR) 10 MG tablet, Take 10 mg by mouth Every Morning., Disp: , Rfl:   •  multivitamin (DAILY ARACELI) tablet tablet, Take 1 tablet by mouth Daily., Disp: , Rfl:   •  omeprazole (priLOSEC) 20 MG capsule, Take 20 mg by mouth Daily As Needed., Disp: , Rfl:   •  potassium chloride (MICRO-K) 10 MEQ CR capsule, Take 10 mEq by mouth Daily As Needed., Disp: , Rfl:   •  pramipexole (MIRAPEX) 1 MG tablet, Take 1 mg by mouth Every Night. Doctors office states to take 1 mg twice daily, patient states that she takes 0.5 mg in the morning and 1 mg at night., Disp: , Rfl:   •  pravastatin (PRAVACHOL) 10 MG tablet, Take 10 mg by mouth Every Night., Disp: , Rfl:   •  ranitidine (ZANTAC) 150 MG capsule, Take 150 mg by mouth. TAKES 150 AT LEAST ONCE SOMETIMES TWICE A DAY, Disp: , Rfl:   •  XARELTO 20 MG tablet, TAKE ONE TABLET BY MOUTH DAILY WITH DINNER, Disp: 30 tablet, Rfl: 2  •  amLODIPine (NORVASC) 5 MG tablet, Take 1 tablet by mouth Daily., Disp: 30 tablet, Rfl: 5      The following portions of the patient's history were reviewed and updated as appropriate: allergies, current medications, past family history, past medical history, past social history, past surgical history and problem list.    Social History     Tobacco Use   • Smoking status: Never Smoker   • Smokeless tobacco: Never Used   Substance Use Topics   • Alcohol use: No     Comment: !x yearly   • Drug use: No       Review of Systems   Constitution: Negative for malaise/fatigue.   Cardiovascular: Positive for palpitations (Flutters sometimes. ).  "Negative for chest pain, leg swelling and syncope.   Respiratory: Negative for cough, shortness of breath and wheezing.    Neurological: Negative for dizziness and light-headedness.       Objective   Vitals:    11/13/18 1451   BP: 153/89   BP Location: Left arm   Patient Position: Sitting   Cuff Size: Adult   Pulse: 87   SpO2: 99%   Weight: 123 kg (271 lb)   Height: 157.5 cm (62\")     Body mass index is 49.57 kg/m².        Physical Exam   Constitutional: She is oriented to person, place, and time. She appears well-developed and well-nourished.   HENT:   Head: Normocephalic and atraumatic.   Cardiovascular: Normal rate, regular rhythm and normal heart sounds. Exam reveals no S3 and no S4.   No murmur heard.  Pulmonary/Chest: Effort normal and breath sounds normal. She has no wheezes. She has no rales.   Abdominal: Soft. Bowel sounds are normal.   Musculoskeletal: She exhibits no edema.   Neurological: She is alert and oriented to person, place, and time.   Skin: Skin is warm and dry.   Psychiatric: She has a normal mood and affect. Her behavior is normal.       Lab Results   Component Value Date     11/13/2017    K 3.9 11/13/2017     11/13/2017    CO2 29.0 11/13/2017    BUN 12 11/13/2017    CREATININE 0.70 11/13/2017    GLUCOSE 102 (H) 11/13/2017    CALCIUM 9.4 11/13/2017    AST 20 11/13/2017    ALT 24 11/13/2017    ALKPHOS 120 (H) 11/13/2017    LABIL2 1.2 (L) 03/10/2015     Lab Results   Component Value Date    CKTOTAL 27 07/18/2017     Lab Results   Component Value Date    WBC 6.39 11/13/2017    HGB 13.5 11/13/2017    HCT 40.6 11/13/2017     11/13/2017     Lab Results   Component Value Date    INR 1.13 (H) 08/07/2017    INR 1.03 07/17/2017    INR 0.99 07/17/2017     Lab Results   Component Value Date    MG 1.7 07/17/2017     Lab Results   Component Value Date    TSH <0.010 (L) 07/17/2017    CHLPL 164 03/10/2015    TRIG 82 03/10/2015    HDL 49 (L) 03/10/2015    LDL 99 03/10/2015     "       Procedures      Assessment/Plan    Diagnosis Plan   1. Paroxysmal atrial fibrillation (CMS/HCC)     2. Essential hypertension     3. Palpitations     4. Dyslipidemia                  Recommendations:    1. Continue HCTZ, metoprolol, pravastatin, and Xarelto    2. Will add amlodipine 5 mg daily for uncontrolled blood pressure. She will monitor at home. We discussed parameters.    3. I discussed 30 day event findings at length with her    4. Follow up in 4 months and PRN             Return in about 4 months (around 3/13/2019) for Recheck.    As always, I appreciate very much the opportunity to participate in the cardiovascular care of your patients.      With Best Regards,    Lissette Denis, NHAN

## 2018-11-14 DIAGNOSIS — R00.2 PALPITATIONS: ICD-10-CM

## 2018-11-14 DIAGNOSIS — R55 SYNCOPE, UNSPECIFIED SYNCOPE TYPE: ICD-10-CM

## 2018-12-03 RX ORDER — METOPROLOL TARTRATE 100 MG/1
TABLET ORAL
Qty: 180 TABLET | Refills: 0 | Status: SHIPPED | OUTPATIENT
Start: 2018-12-03 | End: 2019-03-01 | Stop reason: SDUPTHER

## 2018-12-10 RX ORDER — RIVAROXABAN 20 MG/1
TABLET, FILM COATED ORAL
Qty: 30 TABLET | Refills: 4 | Status: SHIPPED | OUTPATIENT
Start: 2018-12-10 | End: 2019-08-28 | Stop reason: SDUPTHER

## 2018-12-26 RX ORDER — HYDROCHLOROTHIAZIDE 12.5 MG/1
TABLET ORAL
Qty: 30 TABLET | Refills: 3 | Status: SHIPPED | OUTPATIENT
Start: 2018-12-26 | End: 2019-12-18 | Stop reason: ALTCHOICE

## 2018-12-28 RX ORDER — POTASSIUM CHLORIDE 750 MG/1
10 CAPSULE, EXTENDED RELEASE ORAL DAILY
Qty: 30 CAPSULE | Refills: 0 | Status: SHIPPED | OUTPATIENT
Start: 2018-12-28 | End: 2019-01-23 | Stop reason: SDUPTHER

## 2019-01-23 RX ORDER — POTASSIUM CHLORIDE 750 MG/1
CAPSULE, EXTENDED RELEASE ORAL
Qty: 30 CAPSULE | Refills: 0 | Status: SHIPPED | OUTPATIENT
Start: 2019-01-23 | End: 2019-03-01 | Stop reason: SDUPTHER

## 2019-01-23 NOTE — TELEPHONE ENCOUNTER
Last appt 11/13/18.  Next appt 3/13/19.    Last Rx sent in #30 w/0 refill on 12/28/18.    Last CMP on file 11/13/17.

## 2019-01-23 NOTE — TELEPHONE ENCOUNTER
Requested most recent labs from PCP's office-they report most recent ones on file 7/2018 and are faxing us the reports.

## 2019-03-01 RX ORDER — POTASSIUM CHLORIDE 750 MG/1
CAPSULE, EXTENDED RELEASE ORAL
Qty: 30 CAPSULE | Refills: 0 | Status: SHIPPED | OUTPATIENT
Start: 2019-03-01 | End: 2019-12-18 | Stop reason: ALTCHOICE

## 2019-03-01 RX ORDER — METOPROLOL TARTRATE 100 MG/1
TABLET ORAL
Qty: 30 TABLET | Refills: 0 | Status: SHIPPED | OUTPATIENT
Start: 2019-03-01 | End: 2022-05-17 | Stop reason: SDUPTHER

## 2019-03-26 RX ORDER — METOPROLOL TARTRATE 100 MG/1
TABLET ORAL
Qty: 30 TABLET | Refills: 0 | OUTPATIENT
Start: 2019-03-26

## 2019-03-27 ENCOUNTER — TRANSCRIBE ORDERS (OUTPATIENT)
Dept: ADMINISTRATIVE | Facility: HOSPITAL | Age: 54
End: 2019-03-27

## 2019-03-27 ENCOUNTER — HOSPITAL ENCOUNTER (OUTPATIENT)
Dept: GENERAL RADIOLOGY | Facility: HOSPITAL | Age: 54
Discharge: HOME | End: 2019-03-27
Admitting: PHYSICIAN ASSISTANT

## 2019-03-27 DIAGNOSIS — M25.562 LEFT KNEE PAIN, UNSPECIFIED CHRONICITY: ICD-10-CM

## 2019-03-27 DIAGNOSIS — M25.561 RIGHT KNEE PAIN, UNSPECIFIED CHRONICITY: ICD-10-CM

## 2019-03-27 DIAGNOSIS — M25.561 RIGHT KNEE PAIN, UNSPECIFIED CHRONICITY: Primary | ICD-10-CM

## 2019-03-27 PROCEDURE — 73562 X-RAY EXAM OF KNEE 3: CPT

## 2019-03-27 PROCEDURE — 73564 X-RAY EXAM KNEE 4 OR MORE: CPT | Performed by: RADIOLOGY

## 2019-04-09 ENCOUNTER — TRANSCRIBE ORDERS (OUTPATIENT)
Dept: ADMINISTRATIVE | Facility: HOSPITAL | Age: 54
End: 2019-04-09

## 2019-04-09 DIAGNOSIS — T14.90XA INJURY, UNSPECIFIED, INITIAL ENCOUNTER: Primary | ICD-10-CM

## 2019-04-09 RX ORDER — POTASSIUM CHLORIDE 750 MG/1
CAPSULE, EXTENDED RELEASE ORAL
Qty: 30 CAPSULE | Refills: 0 | OUTPATIENT
Start: 2019-04-09

## 2019-04-19 RX ORDER — POTASSIUM CHLORIDE 750 MG/1
CAPSULE, EXTENDED RELEASE ORAL
Qty: 30 CAPSULE | Refills: 0 | OUTPATIENT
Start: 2019-04-19

## 2019-05-13 RX ORDER — AMLODIPINE BESYLATE 5 MG/1
TABLET ORAL
Qty: 90 TABLET | Refills: 4 | OUTPATIENT
Start: 2019-05-13

## 2019-05-13 RX ORDER — AMLODIPINE BESYLATE 5 MG/1
TABLET ORAL
Qty: 90 TABLET | Refills: 0 | OUTPATIENT
Start: 2019-05-13

## 2019-05-14 RX ORDER — RIVAROXABAN 20 MG/1
TABLET, FILM COATED ORAL
Qty: 30 TABLET | Refills: 3 | OUTPATIENT
Start: 2019-05-14

## 2019-05-15 ENCOUNTER — HOSPITAL ENCOUNTER (EMERGENCY)
Facility: HOSPITAL | Age: 54
Discharge: HOME | End: 2019-05-16
Attending: EMERGENCY MEDICINE | Admitting: FAMILY MEDICINE

## 2019-05-15 ENCOUNTER — APPOINTMENT (OUTPATIENT)
Dept: CT IMAGING | Facility: HOSPITAL | Age: 54
End: 2019-05-15

## 2019-05-15 DIAGNOSIS — R10.9 LEFT FLANK PAIN: Primary | ICD-10-CM

## 2019-05-15 LAB
ALBUMIN SERPL-MCNC: 4.44 G/DL (ref 3.5–5.2)
ALBUMIN/GLOB SERPL: 1.3 G/DL
ALP SERPL-CCNC: 66 U/L (ref 39–117)
ALT SERPL W P-5'-P-CCNC: 27 U/L (ref 1–33)
ANION GAP SERPL CALCULATED.3IONS-SCNC: 16.3 MMOL/L
AST SERPL-CCNC: 28 U/L (ref 1–32)
BACTERIA UR QL AUTO: ABNORMAL /HPF
BASOPHILS # BLD AUTO: 0.07 10*3/MM3 (ref 0–0.2)
BASOPHILS NFR BLD AUTO: 0.9 % (ref 0–1.5)
BILIRUB SERPL-MCNC: 0.2 MG/DL (ref 0.2–1.2)
BILIRUB UR QL STRIP: NEGATIVE
BUN BLD-MCNC: 14 MG/DL (ref 6–20)
BUN/CREAT SERPL: 14.9 (ref 7–25)
CALCIUM SPEC-SCNC: 9.3 MG/DL (ref 8.6–10.5)
CHLORIDE SERPL-SCNC: 100 MMOL/L (ref 98–107)
CLARITY UR: CLEAR
CO2 SERPL-SCNC: 24.7 MMOL/L (ref 22–29)
COLOR UR: YELLOW
CREAT BLD-MCNC: 0.94 MG/DL (ref 0.57–1)
CRP SERPL-MCNC: 0.44 MG/DL (ref 0–0.5)
DEPRECATED RDW RBC AUTO: 40.3 FL (ref 37–54)
EOSINOPHIL # BLD AUTO: 0.22 10*3/MM3 (ref 0–0.4)
EOSINOPHIL NFR BLD AUTO: 2.7 % (ref 0.3–6.2)
ERYTHROCYTE [DISTWIDTH] IN BLOOD BY AUTOMATED COUNT: 12.5 % (ref 12.3–15.4)
GFR SERPL CREATININE-BSD FRML MDRD: 63 ML/MIN/1.73
GLOBULIN UR ELPH-MCNC: 3.5 GM/DL
GLUCOSE BLD-MCNC: 102 MG/DL (ref 65–99)
GLUCOSE UR STRIP-MCNC: NEGATIVE MG/DL
HCT VFR BLD AUTO: 42.8 % (ref 34–46.6)
HGB BLD-MCNC: 14.4 G/DL (ref 12–15.9)
HGB UR QL STRIP.AUTO: NEGATIVE
HOLD SPECIMEN: NORMAL
HOLD SPECIMEN: NORMAL
HYALINE CASTS UR QL AUTO: ABNORMAL /LPF
IMM GRANULOCYTES # BLD AUTO: 0.02 10*3/MM3 (ref 0–0.05)
IMM GRANULOCYTES NFR BLD AUTO: 0.2 % (ref 0–0.5)
KETONES UR QL STRIP: NEGATIVE
LEUKOCYTE ESTERASE UR QL STRIP.AUTO: ABNORMAL
LIPASE SERPL-CCNC: 22 U/L (ref 13–60)
LYMPHOCYTES # BLD AUTO: 3 10*3/MM3 (ref 0.7–3.1)
LYMPHOCYTES NFR BLD AUTO: 37.2 % (ref 19.6–45.3)
MCH RBC QN AUTO: 29.9 PG (ref 26.6–33)
MCHC RBC AUTO-ENTMCNC: 33.6 G/DL (ref 31.5–35.7)
MCV RBC AUTO: 89 FL (ref 79–97)
MONOCYTES # BLD AUTO: 0.8 10*3/MM3 (ref 0.1–0.9)
MONOCYTES NFR BLD AUTO: 9.9 % (ref 5–12)
NEUTROPHILS # BLD AUTO: 3.95 10*3/MM3 (ref 1.7–7)
NEUTROPHILS NFR BLD AUTO: 49.1 % (ref 42.7–76)
NITRITE UR QL STRIP: NEGATIVE
PH UR STRIP.AUTO: 7 [PH] (ref 5–8)
PLATELET # BLD AUTO: 335 10*3/MM3 (ref 140–450)
PMV BLD AUTO: 10 FL (ref 6–12)
POTASSIUM BLD-SCNC: 4.1 MMOL/L (ref 3.5–5.2)
PROT SERPL-MCNC: 7.9 G/DL (ref 6–8.5)
PROT UR QL STRIP: NEGATIVE
RBC # BLD AUTO: 4.81 10*6/MM3 (ref 3.77–5.28)
RBC # UR: ABNORMAL /HPF
REF LAB TEST METHOD: ABNORMAL
SODIUM BLD-SCNC: 141 MMOL/L (ref 136–145)
SP GR UR STRIP: 1.01 (ref 1–1.03)
SQUAMOUS #/AREA URNS HPF: ABNORMAL /HPF
UROBILINOGEN UR QL STRIP: ABNORMAL
WBC NRBC COR # BLD: 8.06 10*3/MM3 (ref 3.4–10.8)
WBC UR QL AUTO: ABNORMAL /HPF
WHOLE BLOOD HOLD SPECIMEN: NORMAL
WHOLE BLOOD HOLD SPECIMEN: NORMAL

## 2019-05-15 PROCEDURE — 74177 CT ABD & PELVIS W/CONTRAST: CPT | Performed by: RADIOLOGY

## 2019-05-15 PROCEDURE — 25010000002 ONDANSETRON PER 1 MG: Performed by: FAMILY MEDICINE

## 2019-05-15 PROCEDURE — 74177 CT ABD & PELVIS W/CONTRAST: CPT

## 2019-05-15 PROCEDURE — 86140 C-REACTIVE PROTEIN: CPT | Performed by: FAMILY MEDICINE

## 2019-05-15 PROCEDURE — 80053 COMPREHEN METABOLIC PANEL: CPT | Performed by: FAMILY MEDICINE

## 2019-05-15 PROCEDURE — 81001 URINALYSIS AUTO W/SCOPE: CPT | Performed by: FAMILY MEDICINE

## 2019-05-15 PROCEDURE — 96374 THER/PROPH/DIAG INJ IV PUSH: CPT

## 2019-05-15 PROCEDURE — 25010000002 MORPHINE PER 10 MG: Performed by: FAMILY MEDICINE

## 2019-05-15 PROCEDURE — 83690 ASSAY OF LIPASE: CPT | Performed by: FAMILY MEDICINE

## 2019-05-15 PROCEDURE — 99284 EMERGENCY DEPT VISIT MOD MDM: CPT

## 2019-05-15 PROCEDURE — 96375 TX/PRO/DX INJ NEW DRUG ADDON: CPT

## 2019-05-15 PROCEDURE — 0 IOVERSOL 68 % SOLUTION: Performed by: FAMILY MEDICINE

## 2019-05-15 PROCEDURE — 85025 COMPLETE CBC W/AUTO DIFF WBC: CPT | Performed by: FAMILY MEDICINE

## 2019-05-15 PROCEDURE — 96376 TX/PRO/DX INJ SAME DRUG ADON: CPT

## 2019-05-15 RX ORDER — ONDANSETRON 2 MG/ML
4 INJECTION INTRAMUSCULAR; INTRAVENOUS ONCE
Status: COMPLETED | OUTPATIENT
Start: 2019-05-15 | End: 2019-05-15

## 2019-05-15 RX ORDER — SODIUM CHLORIDE 0.9 % (FLUSH) 0.9 %
10 SYRINGE (ML) INJECTION AS NEEDED
Status: DISCONTINUED | OUTPATIENT
Start: 2019-05-15 | End: 2019-05-16 | Stop reason: HOSPADM

## 2019-05-15 RX ADMIN — MORPHINE SULFATE 4 MG: 4 INJECTION INTRAVENOUS at 22:44

## 2019-05-15 RX ADMIN — IOVERSOL 100 ML: 678 INJECTION INTRA-ARTERIAL; INTRAVENOUS at 23:23

## 2019-05-15 RX ADMIN — MORPHINE SULFATE 4 MG: 4 INJECTION INTRAVENOUS at 20:57

## 2019-05-15 RX ADMIN — SODIUM CHLORIDE 1000 ML: 9 INJECTION, SOLUTION INTRAVENOUS at 20:55

## 2019-05-15 RX ADMIN — ONDANSETRON 4 MG: 2 INJECTION, SOLUTION INTRAMUSCULAR; INTRAVENOUS at 20:46

## 2019-05-16 VITALS
HEART RATE: 73 BPM | BODY MASS INDEX: 48.76 KG/M2 | SYSTOLIC BLOOD PRESSURE: 118 MMHG | DIASTOLIC BLOOD PRESSURE: 79 MMHG | HEIGHT: 62 IN | OXYGEN SATURATION: 99 % | RESPIRATION RATE: 17 BRPM | TEMPERATURE: 98.1 F | WEIGHT: 265 LBS

## 2019-05-16 RX ORDER — HYDROCODONE BITARTRATE AND ACETAMINOPHEN 7.5; 325 MG/1; MG/1
1 TABLET ORAL EVERY 8 HOURS PRN
Qty: 8 TABLET | Refills: 0 | Status: SHIPPED | OUTPATIENT
Start: 2019-05-16 | End: 2020-06-10 | Stop reason: ALTCHOICE

## 2019-05-28 ENCOUNTER — TRANSCRIBE ORDERS (OUTPATIENT)
Dept: ADMINISTRATIVE | Facility: HOSPITAL | Age: 54
End: 2019-05-28

## 2019-05-28 DIAGNOSIS — N28.9 KIDNEY LESION: Primary | ICD-10-CM

## 2019-05-31 ENCOUNTER — OFFICE VISIT (OUTPATIENT)
Dept: UROLOGY | Facility: CLINIC | Age: 54
End: 2019-05-31

## 2019-05-31 VITALS — BODY MASS INDEX: 48.76 KG/M2 | HEIGHT: 62 IN | WEIGHT: 265 LBS

## 2019-05-31 DIAGNOSIS — N28.1 RENAL CYST: Primary | ICD-10-CM

## 2019-05-31 PROCEDURE — 99203 OFFICE O/P NEW LOW 30 MIN: CPT | Performed by: UROLOGY

## 2019-05-31 RX ORDER — FUROSEMIDE 20 MG/1
20 TABLET ORAL AS NEEDED
COMMUNITY
End: 2020-06-10 | Stop reason: ALTCHOICE

## 2019-05-31 NOTE — PROGRESS NOTES
Chief Complaint:          Chief Complaint   Patient presents with   • Renal Mass     New pt       HPI:   53 y.o. female.  53-year-old white female referred with a left renal mass seen on a CT dated 5/15/2019.  She has low back pain she was eating and had such severe left-sided flank pain it caused her to spit her food up she is been hospitalized with diverticulitis.  She is hypertensive, atrial fibrillation, degenerative joint disease, bone issues, recent total knee.   3 para 2 hysterectomy at age 30 a thyroidectomy for large goiter she has irritable bowel syndrome alternating diarrhea and constipation she was hospitalized with diverticulitis she currently has a normal CRP, normal lipase.  An exophytic cyst colonoscopy 8 years ago but I saw a number of air-fluid levels none thickened colon and I am concerned that she has something with her: I would recommend a call colonoscopy rather than anything genitourinary.  Her abdomen soft.  This is a Bosniak 1 cyst.    Past Medical History:        Past Medical History:   Diagnosis Date   • Acid reflux    • Allergic    • Anxiety    • Asthma    • Atrial fibrillation (CMS/HCC)    • Diastolic dysfunction     Grade I   • Diverticula of colon    • Endometriosis    • Gout    • Hyperlipidemia    • Hypertension    • Hyperthyroidism    • Morbid obesity (CMS/HCC)    • Panic attack    • RA (rheumatoid arthritis) (CMS/HCC)    • Sleep apnea     C-PAP SETTING OF 17, ONLY USES WHEN NOT SLEEPING IN RECLINER    • Spinal headache    • Vitamin D deficiency    • Wears glasses          Current Meds:     Current Outpatient Medications   Medication Sig Dispense Refill   • acetaminophen-codeine (TYLENOL #3) 300-30 MG per tablet Take 1 tablet by mouth Every 6 (Six) Hours As Needed for Moderate Pain .     • albuterol (PROVENTIL HFA;VENTOLIN HFA) 108 (90 BASE) MCG/ACT inhaler Inhale 2 puffs As Needed for Wheezing.     • amitriptyline (ELAVIL) 100 MG tablet Take 100 mg by mouth Every Evening.      • amLODIPine (NORVASC) 5 MG tablet Take 1 tablet by mouth Daily. 30 tablet 5   • febuxostat (ULORIC) 40 MG tablet Take 40 mg by mouth Daily.     • furosemide (LASIX) 20 MG tablet Take 20 mg by mouth As Needed.     • hydrochlorothiazide (HYDRODIURIL) 12.5 MG tablet TAKE ONE TABLET BY MOUTH DAILY 30 tablet 3   • levothyroxine (SYNTHROID) 125 MCG tablet Take 1 tablet by mouth Daily. 30 tablet 1   • metoprolol tartrate (LOPRESSOR) 100 MG tablet TAKE ONE TABLET BY MOUTH EVERY 12 HOURS 30 tablet 0   • montelukast (SINGULAIR) 10 MG tablet Take 10 mg by mouth Every Morning.     • omeprazole (priLOSEC) 20 MG capsule Take 20 mg by mouth Daily As Needed.     • potassium chloride (MICRO-K) 10 MEQ CR capsule TAKE ONE CAPSULE BY MOUTH DAILY 30 capsule 0   • pramipexole (MIRAPEX) 1 MG tablet Take 1 mg by mouth Every Night. Doctors office states to take 1 mg twice daily, patient states that she takes 0.5 mg in the morning and 1 mg at night.     • XARELTO 20 MG tablet TAKE ONE TABLET BY MOUTH DAILY WITH DINNER 30 tablet 4   • calcitriol (ROCALTROL) 0.25 MCG capsule Take 1 capsule by mouth 2 (Two) Times a Day. 60 capsule 1   • calcitriol (ROCALTROL) 0.25 MCG capsule Take 1 capsule by mouth 2 (Two) Times a Day. 60 capsule 1   • colchicine 0.6 MG tablet Take 0.6 mg by mouth 2 (Two) Times a Day.     • cyclobenzaprine (FLEXERIL) 10 MG tablet Take 1 tablet by mouth 3 (Three) Times a Day As Needed for Muscle Spasms. 12 tablet 0   • HYDROcodone-acetaminophen (NORCO) 7.5-325 MG per tablet Take 1 tablet by mouth Every 8 (Eight) Hours As Needed for Moderate Pain . 8 tablet 0   • multivitamin (DAILY ARACELI) tablet tablet Take 1 tablet by mouth Daily.     • pravastatin (PRAVACHOL) 10 MG tablet Take 10 mg by mouth Every Night.     • ranitidine (ZANTAC) 150 MG capsule Take 150 mg by mouth. TAKES 150 AT LEAST ONCE SOMETIMES TWICE A DAY       No current facility-administered medications for this visit.         Allergies:      No Known Allergies      Past Surgical History:     Past Surgical History:   Procedure Laterality Date   •  SECTION      x2   • COLONOSCOPY     • FOOT SURGERY Left     ASHLEY NEUROMA, BONE SPUR AND ACHILLES REPAIR AND PLANTAR FASCITITS    • GASTRIC SLEEVE LAPAROSCOPIC      2 years ago   • HARDWARE REMOVAL FOOT / ANKLE Left    • HYSTERECTOMY      BSO    • JOINT REPLACEMENT     • KNEE SURGERY Left     2 (1 was partial knee replacement)   • NOSE SURGERY      DEVIATED SEPTUM AND SWOLLEN TURBINATES   • SKIN BIOPSY     • THYROIDECTOMY N/A 2017    Procedure: TOTAL THYROIDECTOMY;  Surgeon: Lencho Kay MD;  Location: [facility];  Service:    • TOOTH EXTRACTION     • WISDOM TOOTH EXTRACTION           Social History:     Social History     Socioeconomic History   • Marital status:      Spouse name: Not on file   • Number of children: Not on file   • Years of education: Not on file   • Highest education level: Not on file   Tobacco Use   • Smoking status: Never Smoker   • Smokeless tobacco: Never Used   Substance and Sexual Activity   • Alcohol use: No     Comment: !x yearly   • Drug use: No   • Sexual activity: Defer     Comment: Hysterectomy       Family History:     Family History   Problem Relation Age of Onset   • Heart attack Mother    • Arrhythmia Father    • Arrhythmia Sister    • Heart attack Maternal Grandmother    • Heart disease Maternal Grandmother    • Heart attack Maternal Grandfather        Review of Systems:     Review of Systems   Constitutional: Negative.  Negative for activity change, appetite change, chills, diaphoresis, fatigue and unexpected weight change.   HENT: Negative for congestion, dental problem, drooling, ear discharge, ear pain, facial swelling, hearing loss, mouth sores, nosebleeds, postnasal drip, rhinorrhea, sinus pressure, sneezing, sore throat, tinnitus, trouble swallowing and voice change.    Eyes: Negative.  Negative for photophobia, pain, discharge, redness, itching and visual  disturbance.   Respiratory: Negative.  Negative for apnea, cough, choking, chest tightness, shortness of breath, wheezing and stridor.    Cardiovascular: Negative.  Negative for chest pain, palpitations and leg swelling.   Gastrointestinal: Negative.  Negative for abdominal distention, abdominal pain, anal bleeding, blood in stool, constipation, diarrhea, nausea, rectal pain and vomiting.   Endocrine: Negative.  Negative for cold intolerance, heat intolerance, polydipsia, polyphagia and polyuria.   Musculoskeletal: Negative.  Negative for arthralgias, back pain, gait problem, joint swelling, myalgias, neck pain and neck stiffness.   Skin: Negative.  Negative for color change, pallor, rash and wound.   Allergic/Immunologic: Negative.  Negative for environmental allergies, food allergies and immunocompromised state.   Neurological: Negative.  Negative for dizziness, tremors, seizures, syncope, facial asymmetry, speech difficulty, weakness, light-headedness, numbness and headaches.   Hematological: Negative.  Negative for adenopathy. Does not bruise/bleed easily.   Psychiatric/Behavioral: Negative for agitation, behavioral problems, confusion, decreased concentration, dysphoric mood, hallucinations, self-injury, sleep disturbance and suicidal ideas. The patient is not nervous/anxious and is not hyperactive.    All other systems reviewed and are negative.      Physical Exam:     Physical Exam   Constitutional: She appears well-developed and well-nourished.   HENT:   Head: Normocephalic and atraumatic.   Right Ear: External ear normal.   Left Ear: External ear normal.   Mouth/Throat: Oropharynx is clear and moist.   Eyes: Conjunctivae are normal. Pupils are equal, round, and reactive to light.   Cardiovascular: Normal rate, regular rhythm, normal heart sounds and intact distal pulses.   Pulmonary/Chest: Effort normal and breath sounds normal.   Abdominal: Soft. Bowel sounds are normal. She exhibits no distension and no  mass. There is no tenderness. There is no rebound and no guarding.   Genitourinary: No vaginal discharge found.   Musculoskeletal: Normal range of motion.   Neurological: She is alert. She has normal reflexes.   Skin: Skin is warm and dry.   Psychiatric: She has a normal mood and affect. Her behavior is normal. Judgment and thought content normal.       I have reviewed the following portions of the patient's history: allergies, current medications, past family history, past medical history, past social history, past surgical history, problem list and ROS and confirm it's accurate.      Procedure:       Assessment/Plan:   Renal cyst-I discussed the Bosniak classification of renal cysts.  I discussed the strict criteria involved with making a decision regarding intervention.  We discussed the fact that this is likely a Bosniak type I cyst which has sharp distinct borders and a thin wall and no internal echoes versus a Bosniak 2 with a thicker wall and faint striations.  We discussed the risks of malignancy in these situations is essentially 0 and requires no further intervention however we discussed the fact that a Bosniak 3 has about a 28% chance of malignancy and finally a Bosniak for probably is representative of a renal cell carcinoma variant.  He discussed the fact that these are generally asymptomatic and do not require intervention and that the appropriate surgical management is a radiographic cyst puncture when causing pain or problems particularly on the left with an early satiety            Patient's Body mass index is 48.47 kg/m². BMI is above normal parameters. Recommendations include: educational material.              This document has been electronically signed by JULIETTE LOPEZ MD May 31, 2019 8:39 AM

## 2019-06-03 ENCOUNTER — APPOINTMENT (OUTPATIENT)
Dept: CT IMAGING | Facility: HOSPITAL | Age: 54
End: 2019-06-03

## 2019-07-12 ENCOUNTER — TELEPHONE (OUTPATIENT)
Dept: CARDIOLOGY | Facility: CLINIC | Age: 54
End: 2019-07-12

## 2019-07-12 NOTE — TELEPHONE ENCOUNTER
Called pt to advise her that  has cleared her to have a colonoscopy and for her to hold her Xarelto for 2 days prior and restart ASAP. I also advised her that the longer she is off Xarelto the higher risk of having a stroke. She expressed understanding.

## 2019-08-28 ENCOUNTER — OFFICE VISIT (OUTPATIENT)
Dept: CARDIOLOGY | Facility: CLINIC | Age: 54
End: 2019-08-28

## 2019-08-28 VITALS
BODY MASS INDEX: 45.27 KG/M2 | HEART RATE: 76 BPM | HEIGHT: 62 IN | SYSTOLIC BLOOD PRESSURE: 153 MMHG | DIASTOLIC BLOOD PRESSURE: 96 MMHG | WEIGHT: 246 LBS | OXYGEN SATURATION: 93 %

## 2019-08-28 DIAGNOSIS — E78.5 DYSLIPIDEMIA: ICD-10-CM

## 2019-08-28 DIAGNOSIS — I10 ESSENTIAL HYPERTENSION: ICD-10-CM

## 2019-08-28 DIAGNOSIS — I48.0 PAROXYSMAL ATRIAL FIBRILLATION (HCC): Primary | ICD-10-CM

## 2019-08-28 PROCEDURE — 99213 OFFICE O/P EST LOW 20 MIN: CPT | Performed by: NURSE PRACTITIONER

## 2019-08-28 PROCEDURE — 93000 ELECTROCARDIOGRAM COMPLETE: CPT | Performed by: NURSE PRACTITIONER

## 2019-08-28 RX ORDER — AMLODIPINE BESYLATE 10 MG/1
10 TABLET ORAL DAILY
Qty: 30 TABLET | Refills: 5 | Status: SHIPPED | OUTPATIENT
Start: 2019-08-28 | End: 2019-12-18 | Stop reason: SDUPTHER

## 2019-08-28 NOTE — PROGRESS NOTES
Jazzmine Zapata PA-C  Ondina Hayes  1965 08/28/2019    Patient Active Problem List   Diagnosis   • Rapid atrial fibrillation (CMS/HCC)   • Paroxysmal atrial fibrillation (CMS/HCC)   • Hypertension   • Renal cyst       Dear Jazzmine Zapata PA-C:    Subjective     Chief Complaint   Patient presents with   • Surgical Clearance     LEFT FOOT I-D SINUS TRACT GRANULOMA           History of Present Illness:    Ondina Hayes is a 53 y.o. female with a history of paroxysmal atrial fibrillation and hypertension.  She presents today for routine cardiology follow-up.  She will be having an I&D of the left foot in the near future.  She has been cleared for surgery from a cardiac standpoint by Dr. Aldana.  She reports she occasionally has palpitations which have been present for nearly 30 years.  She she denies any chest pains or shortness of breath.  Denies dizziness, lightheadedness, near-syncope, or syncope.  She reports her blood pressure has been elevated at home intermittently.  Her blood pressure is also elevated in the office today.          No Known Allergies:      Current Outpatient Medications:   •  acetaminophen-codeine (TYLENOL #3) 300-30 MG per tablet, Take 1 tablet by mouth Every 6 (Six) Hours As Needed for Moderate Pain ., Disp: , Rfl:   •  albuterol (PROVENTIL HFA;VENTOLIN HFA) 108 (90 BASE) MCG/ACT inhaler, Inhale 2 puffs As Needed for Wheezing., Disp: , Rfl:   •  amitriptyline (ELAVIL) 100 MG tablet, Take 100 mg by mouth Every Evening., Disp: , Rfl:   •  amLODIPine (NORVASC) 10 MG tablet, Take 1 tablet by mouth Daily., Disp: 30 tablet, Rfl: 5  •  calcitriol (ROCALTROL) 0.25 MCG capsule, Take 1 capsule by mouth 2 (Two) Times a Day., Disp: 60 capsule, Rfl: 1  •  calcitriol (ROCALTROL) 0.25 MCG capsule, Take 1 capsule by mouth 2 (Two) Times a Day., Disp: 60 capsule, Rfl: 1  •  colchicine 0.6 MG tablet, Take 0.6 mg by mouth 2 (Two) Times a Day., Disp: , Rfl:   •  cyclobenzaprine (FLEXERIL) 10 MG  tablet, Take 1 tablet by mouth 3 (Three) Times a Day As Needed for Muscle Spasms., Disp: 12 tablet, Rfl: 0  •  febuxostat (ULORIC) 40 MG tablet, Take 40 mg by mouth Daily., Disp: , Rfl:   •  furosemide (LASIX) 20 MG tablet, Take 20 mg by mouth As Needed., Disp: , Rfl:   •  hydrochlorothiazide (HYDRODIURIL) 12.5 MG tablet, TAKE ONE TABLET BY MOUTH DAILY, Disp: 30 tablet, Rfl: 3  •  HYDROcodone-acetaminophen (NORCO) 7.5-325 MG per tablet, Take 1 tablet by mouth Every 8 (Eight) Hours As Needed for Moderate Pain ., Disp: 8 tablet, Rfl: 0  •  levothyroxine (SYNTHROID) 125 MCG tablet, Take 1 tablet by mouth Daily., Disp: 30 tablet, Rfl: 1  •  metoprolol tartrate (LOPRESSOR) 100 MG tablet, TAKE ONE TABLET BY MOUTH EVERY 12 HOURS, Disp: 30 tablet, Rfl: 0  •  montelukast (SINGULAIR) 10 MG tablet, Take 10 mg by mouth Every Morning., Disp: , Rfl:   •  multivitamin (DAILY ARACELI) tablet tablet, Take 1 tablet by mouth Daily., Disp: , Rfl:   •  omeprazole (priLOSEC) 20 MG capsule, Take 20 mg by mouth Daily As Needed., Disp: , Rfl:   •  potassium chloride (MICRO-K) 10 MEQ CR capsule, TAKE ONE CAPSULE BY MOUTH DAILY, Disp: 30 capsule, Rfl: 0  •  pramipexole (MIRAPEX) 1 MG tablet, Take 1 mg by mouth Every Night. Doctors office states to take 1 mg twice daily, patient states that she takes 0.5 mg in the morning and 1 mg at night., Disp: , Rfl:   •  pravastatin (PRAVACHOL) 10 MG tablet, Take 10 mg by mouth Every Night., Disp: , Rfl:   •  ranitidine (ZANTAC) 150 MG capsule, Take 150 mg by mouth. TAKES 150 AT LEAST ONCE SOMETIMES TWICE A DAY, Disp: , Rfl:   •  rivaroxaban (XARELTO) 20 MG tablet, Take 1 tablet by mouth Daily With Dinner., Disp: 30 tablet, Rfl: 5      The following portions of the patient's history were reviewed and updated as appropriate: allergies, current medications, past family history, past medical history, past social history, past surgical history and problem list.    Social History     Tobacco Use   • Smoking  "status: Never Smoker   • Smokeless tobacco: Never Used   Substance Use Topics   • Alcohol use: No     Comment: !x yearly   • Drug use: No       Review of Systems   Constitution: Negative for weakness and malaise/fatigue.   Cardiovascular: Positive for palpitations. Negative for chest pain, dyspnea on exertion, irregular heartbeat, leg swelling, near-syncope, orthopnea, paroxysmal nocturnal dyspnea and syncope.   Respiratory: Negative for cough, shortness of breath and wheezing.    Neurological: Negative for dizziness and light-headedness.   All other systems reviewed and are negative.      Objective   Vitals:    08/28/19 0847   BP: 153/96   BP Location: Right arm   Patient Position: Sitting   Cuff Size: Adult   Pulse: 76   SpO2: 93%   Weight: 112 kg (246 lb)   Height: 157.5 cm (62.01\")     Body mass index is 44.98 kg/m².        Physical Exam   Constitutional: She is oriented to person, place, and time. She appears well-developed and well-nourished.   HENT:   Head: Normocephalic and atraumatic.   Cardiovascular: Normal rate, regular rhythm and normal heart sounds. Exam reveals no S3 and no S4.   No murmur heard.  Pulmonary/Chest: Effort normal and breath sounds normal. She has no wheezes. She has no rales.   Abdominal: Soft. Bowel sounds are normal.   Musculoskeletal: She exhibits no edema.   Neurological: She is alert and oriented to person, place, and time.   Skin: Skin is warm and dry.   Psychiatric: She has a normal mood and affect. Her behavior is normal.       Lab Results   Component Value Date     05/15/2019    K 4.1 05/15/2019     05/15/2019    CO2 24.7 05/15/2019    BUN 14 05/15/2019    CREATININE 0.94 05/15/2019    GLUCOSE 102 (H) 05/15/2019    CALCIUM 9.3 05/15/2019    AST 28 05/15/2019    ALT 27 05/15/2019    ALKPHOS 66 05/15/2019    LABIL2 1.2 (L) 03/10/2015       Lab Results   Component Value Date    WBC 8.06 05/15/2019    HGB 14.4 05/15/2019    HCT 42.8 05/15/2019     05/15/2019 "             ECG 12 Lead  Date/Time: 8/28/2019 8:53 AM  Performed by: Lissette Denis APRN  Authorized by: Lissette Denis APRN   Comparison: compared with previous ECG   Similar to previous ECG  Rhythm: sinus rhythm  BPM: 70  Comments: QTc 444              Assessment/Plan    Diagnosis Plan   1. Paroxysmal atrial fibrillation (CMS/HCC)  rivaroxaban (XARELTO) 20 MG tablet   2. Essential hypertension  amLODIPine (NORVASC) 10 MG tablet   3. Dyslipidemia                  Recommendations:    1. Increase amlodipine to 10 mg daily.    2. Continue metoprolol and Xarelto.    3. Follow up in 6 months and PRN.         Return in about 6 months (around 2/28/2020) for Recheck.    As always, I appreciate very much the opportunity to participate in the cardiovascular care of your patients.      With Best Regards,    NHAN Anderson

## 2019-12-02 ENCOUNTER — TRANSCRIBE ORDERS (OUTPATIENT)
Dept: ADMINISTRATIVE | Facility: HOSPITAL | Age: 54
End: 2019-12-02

## 2019-12-02 DIAGNOSIS — N28.89 RENAL MASS: Primary | ICD-10-CM

## 2019-12-11 ENCOUNTER — HOSPITAL ENCOUNTER (OUTPATIENT)
Dept: CT IMAGING | Facility: HOSPITAL | Age: 54
Discharge: HOME | End: 2019-12-11
Admitting: PHYSICIAN ASSISTANT

## 2019-12-11 DIAGNOSIS — N28.89 RENAL MASS: ICD-10-CM

## 2019-12-11 PROCEDURE — 74177 CT ABD & PELVIS W/CONTRAST: CPT

## 2019-12-11 PROCEDURE — 74177 CT ABD & PELVIS W/CONTRAST: CPT | Performed by: RADIOLOGY

## 2019-12-11 PROCEDURE — 0 IOVERSOL 68 % SOLUTION: Performed by: PHYSICIAN ASSISTANT

## 2019-12-11 RX ADMIN — IOVERSOL 100 ML: 678 INJECTION INTRA-ARTERIAL; INTRAVENOUS at 09:59

## 2019-12-18 ENCOUNTER — OFFICE VISIT (OUTPATIENT)
Dept: CARDIOLOGY | Facility: CLINIC | Age: 54
End: 2019-12-18

## 2019-12-18 ENCOUNTER — LAB (OUTPATIENT)
Dept: LAB | Facility: HOSPITAL | Age: 54
End: 2019-12-18

## 2019-12-18 ENCOUNTER — HOSPITAL ENCOUNTER (OUTPATIENT)
Dept: GENERAL RADIOLOGY | Facility: HOSPITAL | Age: 54
Discharge: HOME | End: 2019-12-18
Admitting: NURSE PRACTITIONER

## 2019-12-18 VITALS
BODY MASS INDEX: 49.32 KG/M2 | WEIGHT: 268 LBS | HEIGHT: 62 IN | SYSTOLIC BLOOD PRESSURE: 162 MMHG | DIASTOLIC BLOOD PRESSURE: 87 MMHG | RESPIRATION RATE: 16 BRPM | HEART RATE: 98 BPM

## 2019-12-18 DIAGNOSIS — I48.0 PAROXYSMAL ATRIAL FIBRILLATION (HCC): ICD-10-CM

## 2019-12-18 DIAGNOSIS — I20.8 ANGINAL EQUIVALENT (HCC): ICD-10-CM

## 2019-12-18 DIAGNOSIS — R06.02 SHORTNESS OF BREATH: ICD-10-CM

## 2019-12-18 DIAGNOSIS — R60.0 LOWER EXTREMITY EDEMA: ICD-10-CM

## 2019-12-18 DIAGNOSIS — R07.2 PRECORDIAL PAIN: ICD-10-CM

## 2019-12-18 DIAGNOSIS — E78.5 DYSLIPIDEMIA: ICD-10-CM

## 2019-12-18 DIAGNOSIS — G47.33 OSA (OBSTRUCTIVE SLEEP APNEA): ICD-10-CM

## 2019-12-18 DIAGNOSIS — I48.0 PAROXYSMAL ATRIAL FIBRILLATION (HCC): Primary | ICD-10-CM

## 2019-12-18 DIAGNOSIS — I10 ESSENTIAL HYPERTENSION: ICD-10-CM

## 2019-12-18 LAB
ANION GAP SERPL CALCULATED.3IONS-SCNC: 14.8 MMOL/L (ref 5–15)
BASOPHILS # BLD AUTO: 0.07 10*3/MM3 (ref 0–0.2)
BASOPHILS NFR BLD AUTO: 0.8 % (ref 0–1.5)
BUN BLD-MCNC: 9 MG/DL (ref 6–20)
BUN/CREAT SERPL: 12.9 (ref 7–25)
CALCIUM SPEC-SCNC: 9.7 MG/DL (ref 8.6–10.5)
CHLORIDE SERPL-SCNC: 96 MMOL/L (ref 98–107)
CO2 SERPL-SCNC: 29.2 MMOL/L (ref 22–29)
CREAT BLD-MCNC: 0.7 MG/DL (ref 0.57–1)
DEPRECATED RDW RBC AUTO: 39.1 FL (ref 37–54)
EOSINOPHIL # BLD AUTO: 0.25 10*3/MM3 (ref 0–0.4)
EOSINOPHIL NFR BLD AUTO: 3 % (ref 0.3–6.2)
ERYTHROCYTE [DISTWIDTH] IN BLOOD BY AUTOMATED COUNT: 12.7 % (ref 12.3–15.4)
GFR SERPL CREATININE-BSD FRML MDRD: 88 ML/MIN/1.73
GLUCOSE BLD-MCNC: 82 MG/DL (ref 65–99)
HCT VFR BLD AUTO: 41.3 % (ref 34–46.6)
HGB BLD-MCNC: 14.4 G/DL (ref 12–15.9)
IMM GRANULOCYTES # BLD AUTO: 0.08 10*3/MM3 (ref 0–0.05)
IMM GRANULOCYTES NFR BLD AUTO: 1 % (ref 0–0.5)
LYMPHOCYTES # BLD AUTO: 1.73 10*3/MM3 (ref 0.7–3.1)
LYMPHOCYTES NFR BLD AUTO: 20.8 % (ref 19.6–45.3)
MCH RBC QN AUTO: 29.9 PG (ref 26.6–33)
MCHC RBC AUTO-ENTMCNC: 34.9 G/DL (ref 31.5–35.7)
MCV RBC AUTO: 85.7 FL (ref 79–97)
MONOCYTES # BLD AUTO: 0.67 10*3/MM3 (ref 0.1–0.9)
MONOCYTES NFR BLD AUTO: 8 % (ref 5–12)
NEUTROPHILS # BLD AUTO: 5.53 10*3/MM3 (ref 1.7–7)
NEUTROPHILS NFR BLD AUTO: 66.4 % (ref 42.7–76)
NRBC BLD AUTO-RTO: 0 /100 WBC (ref 0–0.2)
NT-PROBNP SERPL-MCNC: 25 PG/ML (ref 5–900)
PLATELET # BLD AUTO: 379 10*3/MM3 (ref 140–450)
PMV BLD AUTO: 9.8 FL (ref 6–12)
POTASSIUM BLD-SCNC: 3.8 MMOL/L (ref 3.5–5.2)
RBC # BLD AUTO: 4.82 10*6/MM3 (ref 3.77–5.28)
SODIUM BLD-SCNC: 140 MMOL/L (ref 136–145)
WBC NRBC COR # BLD: 8.33 10*3/MM3 (ref 3.4–10.8)

## 2019-12-18 PROCEDURE — 71046 X-RAY EXAM CHEST 2 VIEWS: CPT | Performed by: RADIOLOGY

## 2019-12-18 PROCEDURE — 80048 BASIC METABOLIC PNL TOTAL CA: CPT

## 2019-12-18 PROCEDURE — 93000 ELECTROCARDIOGRAM COMPLETE: CPT | Performed by: NURSE PRACTITIONER

## 2019-12-18 PROCEDURE — 83880 ASSAY OF NATRIURETIC PEPTIDE: CPT

## 2019-12-18 PROCEDURE — 71046 X-RAY EXAM CHEST 2 VIEWS: CPT

## 2019-12-18 PROCEDURE — 85025 COMPLETE CBC W/AUTO DIFF WBC: CPT

## 2019-12-18 PROCEDURE — 99214 OFFICE O/P EST MOD 30 MIN: CPT | Performed by: NURSE PRACTITIONER

## 2019-12-18 PROCEDURE — 36415 COLL VENOUS BLD VENIPUNCTURE: CPT

## 2019-12-18 RX ORDER — AMLODIPINE BESYLATE 5 MG/1
5 TABLET ORAL DAILY
Qty: 30 TABLET | Refills: 5
Start: 2019-12-18 | End: 2021-04-07

## 2019-12-18 RX ORDER — SPIRONOLACTONE 25 MG/1
25 TABLET ORAL DAILY
Qty: 30 TABLET | Refills: 11 | Status: SHIPPED | OUTPATIENT
Start: 2019-12-18 | End: 2020-12-08

## 2019-12-18 NOTE — PROGRESS NOTES
Jazzmine Zapata PA-C  Ondina Hayes  1965 12/18/2019    Patient Active Problem List   Diagnosis   • Rapid atrial fibrillation (CMS/HCC)   • Paroxysmal atrial fibrillation (CMS/HCC)   • Hypertension   • Renal cyst       Dear Jazzmine Zapata PA-C:    Subjective     Chief Complaint   Patient presents with   • Atrial Fibrillation   • Hypertension           History of Present Illness:    Ondina Hayes is a 54 y.o. female with a history of paroxysmal atrial fibrillation and hypertension.  She presents today with multiple issues.  She reports her blood pressure has been very elevated at home.  She relates this to some recent psychosocial stressors.  Her systolic has been as high as 200 and diastolic as high as 120 according to her home monitor.  She has also had worsening edema in both of her lower extremities.  This is worse of the evening but does not completely resolve in the morning hours.  She does have some shortness of breath with moderate exertion.  In addition, she has had left arm pain as well as left neck and jaw pain.  This does not seem to be related to exertion and she cannot identify any aggravating or alleviating factors.  She does note that her edema seems to be worse since increasing the amlodipine to 10 mg daily for her blood pressure.  She has been taking a Lasix tablet only sparingly for worsening edema.  She denies any palpitations, dizziness, or lightheadedness.  She reports she does not feel she has had any episodes of atrial fibrillation since thyroid surgery several months ago.          No Known Allergies:      Current Outpatient Medications:   •  acetaminophen-codeine (TYLENOL #3) 300-30 MG per tablet, Take 1 tablet by mouth Every 6 (Six) Hours As Needed for Moderate Pain ., Disp: , Rfl:   •  albuterol (PROVENTIL HFA;VENTOLIN HFA) 108 (90 BASE) MCG/ACT inhaler, Inhale 2 puffs As Needed for Wheezing., Disp: , Rfl:   •  amitriptyline (ELAVIL) 100 MG tablet, Take 100 mg by mouth Every  Evening., Disp: , Rfl:   •  amLODIPine (NORVASC) 5 MG tablet, Take 1 tablet by mouth Daily., Disp: 30 tablet, Rfl: 5  •  calcitriol (ROCALTROL) 0.25 MCG capsule, Take 1 capsule by mouth 2 (Two) Times a Day., Disp: 60 capsule, Rfl: 1  •  calcitriol (ROCALTROL) 0.25 MCG capsule, Take 1 capsule by mouth 2 (Two) Times a Day., Disp: 60 capsule, Rfl: 1  •  colchicine 0.6 MG tablet, Take 0.6 mg by mouth 2 (Two) Times a Day., Disp: , Rfl:   •  cyclobenzaprine (FLEXERIL) 10 MG tablet, Take 1 tablet by mouth 3 (Three) Times a Day As Needed for Muscle Spasms., Disp: 12 tablet, Rfl: 0  •  febuxostat (ULORIC) 40 MG tablet, Take 40 mg by mouth Daily., Disp: , Rfl:   •  furosemide (LASIX) 20 MG tablet, Take 20 mg by mouth As Needed., Disp: , Rfl:   •  HYDROcodone-acetaminophen (NORCO) 7.5-325 MG per tablet, Take 1 tablet by mouth Every 8 (Eight) Hours As Needed for Moderate Pain ., Disp: 8 tablet, Rfl: 0  •  levothyroxine (SYNTHROID) 125 MCG tablet, Take 1 tablet by mouth Daily., Disp: 30 tablet, Rfl: 1  •  metoprolol tartrate (LOPRESSOR) 100 MG tablet, TAKE ONE TABLET BY MOUTH EVERY 12 HOURS, Disp: 30 tablet, Rfl: 0  •  montelukast (SINGULAIR) 10 MG tablet, Take 10 mg by mouth Every Morning., Disp: , Rfl:   •  multivitamin (DAILY ARACELI) tablet tablet, Take 1 tablet by mouth Daily., Disp: , Rfl:   •  pramipexole (MIRAPEX) 1 MG tablet, Take 1 mg by mouth Every Night. Doctors office states to take 1 mg twice daily, patient states that she takes 0.5 mg in the morning and 1 mg at night., Disp: , Rfl:   •  pravastatin (PRAVACHOL) 10 MG tablet, Take 10 mg by mouth Every Night., Disp: , Rfl:   •  rivaroxaban (XARELTO) 20 MG tablet, Take 1 tablet by mouth Daily With Dinner., Disp: 30 tablet, Rfl: 5  •  spironolactone (ALDACTONE) 25 MG tablet, Take 1 tablet by mouth Daily., Disp: 30 tablet, Rfl: 11      The following portions of the patient's history were reviewed and updated as appropriate: allergies, current medications, past family  "history, past medical history, past social history, past surgical history and problem list.    Social History     Tobacco Use   • Smoking status: Never Smoker   • Smokeless tobacco: Never Used   Substance Use Topics   • Alcohol use: No     Comment: !x yearly   • Drug use: No       Review of Systems   Constitution: Negative for malaise/fatigue.   Cardiovascular: Positive for chest pain, dyspnea on exertion and leg swelling. Negative for irregular heartbeat, near-syncope, orthopnea, palpitations, paroxysmal nocturnal dyspnea and syncope.   Respiratory: Positive for shortness of breath. Negative for cough and wheezing.    Neurological: Negative for dizziness, light-headedness and weakness.       Objective   Vitals:    12/18/19 0857   BP: 162/87   BP Location: Right arm   Pulse: 98   Resp: 16   Weight: 122 kg (268 lb)   Height: 157.5 cm (62.01\")     Body mass index is 49.01 kg/m².        Physical Exam   Constitutional: She is oriented to person, place, and time. She appears well-developed and well-nourished.   HENT:   Head: Normocephalic and atraumatic.   Cardiovascular: Normal rate, regular rhythm and normal heart sounds. Exam reveals no S3 and no S4.   No murmur heard.  Pulmonary/Chest: Effort normal and breath sounds normal. She has no wheezes. She has no rales.   Abdominal: Soft. Bowel sounds are normal.   Musculoskeletal: She exhibits edema (2+ bilateral lower extremities).   Neurological: She is alert and oriented to person, place, and time.   Skin: Skin is warm and dry.   Psychiatric: She has a normal mood and affect. Her behavior is normal.       Lab Results   Component Value Date     05/15/2019    K 4.1 05/15/2019     05/15/2019    CO2 24.7 05/15/2019    BUN 14 05/15/2019    CREATININE 0.94 05/15/2019    GLUCOSE 102 (H) 05/15/2019    CALCIUM 9.3 05/15/2019    AST 28 05/15/2019    ALT 27 05/15/2019    ALKPHOS 66 05/15/2019    LABIL2 1.2 (L) 03/10/2015     Lab Results   Component Value Date    CKTOTAL " 27 07/18/2017     Lab Results   Component Value Date    WBC 8.06 05/15/2019    HGB 14.4 05/15/2019    HCT 42.8 05/15/2019     05/15/2019     Lab Results   Component Value Date    INR 1.13 (H) 08/07/2017    INR 1.03 07/17/2017    INR 0.99 07/17/2017     Lab Results   Component Value Date    MG 1.7 07/17/2017     Lab Results   Component Value Date    TSH <0.010 (L) 07/17/2017    CHLPL 164 03/10/2015    TRIG 82 03/10/2015    HDL 49 (L) 03/10/2015    LDL 99 03/10/2015               ECG 12 Lead  Date/Time: 12/18/2019 11:10 AM  Performed by: Lissette Denis APRN  Authorized by: Lissette Denis APRN   Comparison: compared with previous ECG   Similar to previous ECG  Rhythm: sinus rhythm  BPM: 78                Assessment/Plan    Diagnosis Plan   1. Paroxysmal atrial fibrillation (CMS/HCC)  Basic Metabolic Panel    BNP    CBC & Differential    Adult Transthoracic Echo Complete W/ Cont if Necessary Per Protocol   2. Essential hypertension  amLODIPine (NORVASC) 5 MG tablet    spironolactone (ALDACTONE) 25 MG tablet    Basic Metabolic Panel    BNP    CBC & Differential   3. Precordial pain  ECG 12 Lead    Basic Metabolic Panel    BNP    CBC & Differential   4. Dyslipidemia  Basic Metabolic Panel    BNP    CBC & Differential   5. MICHEL (obstructive sleep apnea)  Basic Metabolic Panel    BNP    CBC & Differential   6. Lower extremity edema  spironolactone (ALDACTONE) 25 MG tablet    Basic Metabolic Panel    BNP    CBC & Differential    XR Chest 2 View    Adult Transthoracic Echo Complete W/ Cont if Necessary Per Protocol   7. Shortness of breath  Basic Metabolic Panel    BNP    CBC & Differential    XR Chest 2 View    Adult Transthoracic Echo Complete W/ Cont if Necessary Per Protocol   8. Anginal equivalent (CMS/HCC)  Basic Metabolic Panel    BNP    CBC & Differential    Stress Test With Myocardial Perfusion One Day                Recommendations:    The left arm pain and neck/jaw pain could be anginal equivalent  considering she has multiple risk factors for coronary artery disease.  Will evaluate further with a Lexiscan sestamibi study.    We will evaluate her LV function with an echocardiogram and tailor further therapy accordingly.  I have ordered a chest x-ray, BMP, and BNP to rule out acute heart failure today.    Since she has had increased edema with the amlodipine, will decrease the dose of amlodipine to 5 mg daily.  Will add spironolactone 25 mg daily.  She will also take the Lasix daily for now.    I have discontinued the HCTZ and potassium supplement.  We will monitor her renal function and potassium very closely.  She voices understanding.  I have asked her to check her blood pressure several times daily and to contact us if readings are above 140/90.  I have encouraged weight loss and a low-sodium diet.        Return in about 3 weeks (around 1/8/2020) for Recheck.    As always, I appreciate very much the opportunity to participate in the cardiovascular care of your patients.      With Best Regards,    NHAN Anderson

## 2019-12-19 ENCOUNTER — TELEPHONE (OUTPATIENT)
Dept: CARDIOLOGY | Facility: CLINIC | Age: 54
End: 2019-12-19

## 2019-12-19 NOTE — TELEPHONE ENCOUNTER
None of the medication changes we made affect her heart rate. She can continue to monitor. Also, her labs indicated she had no signs of heart failure. She should stop the Lasix. She can continue with the other medication changes we made yesterday. BMP in one week. Thanks.

## 2019-12-19 NOTE — TELEPHONE ENCOUNTER
Pt says she had some med adjustments in office yesterday.  She is calling to report her HR is staying around 50 and this is new for her.     She felt a little weak/fatigued this morning but feels ok now. Pt says she is afraid the HR will keep going lower, the longer she takes the meds.  She states her BP this morning was 140/89.

## 2019-12-23 ENCOUNTER — APPOINTMENT (OUTPATIENT)
Dept: NUCLEAR MEDICINE | Facility: HOSPITAL | Age: 54
End: 2019-12-23

## 2019-12-23 ENCOUNTER — APPOINTMENT (OUTPATIENT)
Dept: CARDIOLOGY | Facility: HOSPITAL | Age: 54
End: 2019-12-23

## 2020-01-08 ENCOUNTER — HOSPITAL ENCOUNTER (EMERGENCY)
Facility: HOSPITAL | Age: 55
Discharge: LEFT BEFORE DISCHARGE | End: 2020-01-08
Attending: EMERGENCY MEDICINE | Admitting: EMERGENCY MEDICINE

## 2020-01-08 ENCOUNTER — APPOINTMENT (OUTPATIENT)
Dept: GENERAL RADIOLOGY | Facility: HOSPITAL | Age: 55
End: 2020-01-08

## 2020-01-08 VITALS
SYSTOLIC BLOOD PRESSURE: 130 MMHG | TEMPERATURE: 98.5 F | HEIGHT: 62 IN | HEART RATE: 76 BPM | OXYGEN SATURATION: 99 % | BODY MASS INDEX: 47.84 KG/M2 | WEIGHT: 260 LBS | DIASTOLIC BLOOD PRESSURE: 65 MMHG | RESPIRATION RATE: 18 BRPM

## 2020-01-08 DIAGNOSIS — R07.9 CHEST PAIN, UNSPECIFIED TYPE: Primary | ICD-10-CM

## 2020-01-08 LAB
ALBUMIN SERPL-MCNC: 4.19 G/DL (ref 3.5–5.2)
ALBUMIN/GLOB SERPL: 1.2 G/DL
ALP SERPL-CCNC: 74 U/L (ref 39–117)
ALT SERPL W P-5'-P-CCNC: 47 U/L (ref 1–33)
AMYLASE SERPL-CCNC: 57 U/L (ref 28–100)
ANION GAP SERPL CALCULATED.3IONS-SCNC: 13.4 MMOL/L (ref 5–15)
AST SERPL-CCNC: 40 U/L (ref 1–32)
BASOPHILS # BLD AUTO: 0.03 10*3/MM3 (ref 0–0.2)
BASOPHILS NFR BLD AUTO: 0.5 % (ref 0–1.5)
BILIRUB SERPL-MCNC: 0.4 MG/DL (ref 0.2–1.2)
BILIRUB UR QL STRIP: NEGATIVE
BUN BLD-MCNC: 16 MG/DL (ref 6–20)
BUN/CREAT SERPL: 22.9 (ref 7–25)
CALCIUM SPEC-SCNC: 8.7 MG/DL (ref 8.6–10.5)
CHLORIDE SERPL-SCNC: 95 MMOL/L (ref 98–107)
CLARITY UR: CLEAR
CO2 SERPL-SCNC: 28.6 MMOL/L (ref 22–29)
COLOR UR: YELLOW
CREAT BLD-MCNC: 0.7 MG/DL (ref 0.57–1)
DEPRECATED RDW RBC AUTO: 42.3 FL (ref 37–54)
EOSINOPHIL # BLD AUTO: 0.02 10*3/MM3 (ref 0–0.4)
EOSINOPHIL NFR BLD AUTO: 0.3 % (ref 0.3–6.2)
ERYTHROCYTE [DISTWIDTH] IN BLOOD BY AUTOMATED COUNT: 13 % (ref 12.3–15.4)
GFR SERPL CREATININE-BSD FRML MDRD: 88 ML/MIN/1.73
GLOBULIN UR ELPH-MCNC: 3.5 GM/DL
GLUCOSE BLD-MCNC: 92 MG/DL (ref 65–99)
GLUCOSE UR STRIP-MCNC: NEGATIVE MG/DL
HCT VFR BLD AUTO: 44.7 % (ref 34–46.6)
HGB BLD-MCNC: 15 G/DL (ref 12–15.9)
HGB UR QL STRIP.AUTO: NEGATIVE
IMM GRANULOCYTES # BLD AUTO: 0.06 10*3/MM3 (ref 0–0.05)
IMM GRANULOCYTES NFR BLD AUTO: 0.9 % (ref 0–0.5)
KETONES UR QL STRIP: NEGATIVE
LEUKOCYTE ESTERASE UR QL STRIP.AUTO: NEGATIVE
LIPASE SERPL-CCNC: 44 U/L (ref 13–60)
LYMPHOCYTES # BLD AUTO: 2.52 10*3/MM3 (ref 0.7–3.1)
LYMPHOCYTES NFR BLD AUTO: 38.2 % (ref 19.6–45.3)
MCH RBC QN AUTO: 29.9 PG (ref 26.6–33)
MCHC RBC AUTO-ENTMCNC: 33.6 G/DL (ref 31.5–35.7)
MCV RBC AUTO: 89 FL (ref 79–97)
MONOCYTES # BLD AUTO: 0.75 10*3/MM3 (ref 0.1–0.9)
MONOCYTES NFR BLD AUTO: 11.4 % (ref 5–12)
NEUTROPHILS # BLD AUTO: 3.21 10*3/MM3 (ref 1.7–7)
NEUTROPHILS NFR BLD AUTO: 48.7 % (ref 42.7–76)
NITRITE UR QL STRIP: NEGATIVE
NRBC BLD AUTO-RTO: 0 /100 WBC (ref 0–0.2)
NT-PROBNP SERPL-MCNC: 9.2 PG/ML (ref 5–900)
PH UR STRIP.AUTO: 6.5 [PH] (ref 5–8)
PLATELET # BLD AUTO: 262 10*3/MM3 (ref 140–450)
PMV BLD AUTO: 9.5 FL (ref 6–12)
POTASSIUM BLD-SCNC: 4.2 MMOL/L (ref 3.5–5.2)
PROT SERPL-MCNC: 7.7 G/DL (ref 6–8.5)
PROT UR QL STRIP: NEGATIVE
RBC # BLD AUTO: 5.02 10*6/MM3 (ref 3.77–5.28)
SODIUM BLD-SCNC: 137 MMOL/L (ref 136–145)
SP GR UR STRIP: 1.01 (ref 1–1.03)
TROPONIN T SERPL-MCNC: <0.01 NG/ML (ref 0–0.03)
TROPONIN T SERPL-MCNC: <0.01 NG/ML (ref 0–0.03)
UROBILINOGEN UR QL STRIP: NORMAL
WBC NRBC COR # BLD: 6.59 10*3/MM3 (ref 3.4–10.8)

## 2020-01-08 PROCEDURE — 99285 EMERGENCY DEPT VISIT HI MDM: CPT

## 2020-01-08 PROCEDURE — 71045 X-RAY EXAM CHEST 1 VIEW: CPT | Performed by: RADIOLOGY

## 2020-01-08 PROCEDURE — 81003 URINALYSIS AUTO W/O SCOPE: CPT | Performed by: NURSE PRACTITIONER

## 2020-01-08 PROCEDURE — 71045 X-RAY EXAM CHEST 1 VIEW: CPT

## 2020-01-08 PROCEDURE — 93005 ELECTROCARDIOGRAM TRACING: CPT | Performed by: EMERGENCY MEDICINE

## 2020-01-08 PROCEDURE — 83690 ASSAY OF LIPASE: CPT | Performed by: NURSE PRACTITIONER

## 2020-01-08 PROCEDURE — 83880 ASSAY OF NATRIURETIC PEPTIDE: CPT | Performed by: NURSE PRACTITIONER

## 2020-01-08 PROCEDURE — 82150 ASSAY OF AMYLASE: CPT | Performed by: NURSE PRACTITIONER

## 2020-01-08 PROCEDURE — 80053 COMPREHEN METABOLIC PANEL: CPT | Performed by: NURSE PRACTITIONER

## 2020-01-08 PROCEDURE — 84484 ASSAY OF TROPONIN QUANT: CPT | Performed by: NURSE PRACTITIONER

## 2020-01-08 PROCEDURE — 85025 COMPLETE CBC W/AUTO DIFF WBC: CPT | Performed by: NURSE PRACTITIONER

## 2020-01-08 RX ORDER — ASPIRIN 81 MG/1
324 TABLET, CHEWABLE ORAL ONCE
Status: COMPLETED | OUTPATIENT
Start: 2020-01-08 | End: 2020-01-08

## 2020-01-08 RX ORDER — SODIUM CHLORIDE 0.9 % (FLUSH) 0.9 %
10 SYRINGE (ML) INJECTION AS NEEDED
Status: DISCONTINUED | OUTPATIENT
Start: 2020-01-08 | End: 2020-01-08 | Stop reason: HOSPADM

## 2020-01-08 RX ADMIN — ASPIRIN 324 MG: 81 TABLET, CHEWABLE ORAL at 09:58

## 2020-01-08 NOTE — ED PROVIDER NOTES
Subjective     History provided by:  Patient   used: No    Chest Pain   Pain location:  L chest  Pain quality: sharp    Pain radiates to:  L arm  Pain severity:  Moderate  Onset quality:  Sudden  Duration:  2 hours  Timing:  Constant  Progression:  Waxing and waning  Chronicity:  New  Context: not drug use, not eating, not lifting, not movement and not raising an arm    Relieved by:  Nothing  Worsened by:  Nothing  Ineffective treatments:  None tried  Associated symptoms: nausea    Associated symptoms: no abdominal pain, no AICD problem, no altered mental status, no anorexia, no back pain and no claudication    Risk factors: coronary artery disease and obesity    Risk factors comment:  A. Fib      Review of Systems   Constitutional: Negative.    HENT: Negative.    Eyes: Negative.    Respiratory: Negative.    Cardiovascular: Positive for chest pain. Negative for claudication.   Gastrointestinal: Positive for nausea. Negative for abdominal pain and anorexia.   Endocrine: Negative.    Genitourinary: Negative.    Musculoskeletal: Negative.  Negative for back pain.   Skin: Negative.    Allergic/Immunologic: Negative.    Neurological: Negative.    Hematological: Negative.    Psychiatric/Behavioral: Negative.        Past Medical History:   Diagnosis Date   • Acid reflux    • Allergic    • Anxiety    • Asthma    • Atrial fibrillation (CMS/HCC)    • Diastolic dysfunction     Grade I   • Diverticula of colon    • Endometriosis    • Gout    • Hyperlipidemia    • Hypertension    • Hyperthyroidism    • Morbid obesity (CMS/HCC)    • Panic attack    • RA (rheumatoid arthritis) (CMS/HCC)    • Sleep apnea     C-PAP SETTING OF 17, ONLY USES WHEN NOT SLEEPING IN RECLINER    • Spinal headache    • Vitamin D deficiency    • Wears glasses        No Known Allergies    Past Surgical History:   Procedure Laterality Date   •  SECTION      x2   • COLONOSCOPY     • FOOT SURGERY Left     ASHLEY NEUROMA, BONE SPUR AND  ACHILLES REPAIR AND PLANTAR FASCITITS    • GASTRIC SLEEVE LAPAROSCOPIC      2 years ago   • HARDWARE REMOVAL FOOT / ANKLE Left    • HYSTERECTOMY      BSO    • JOINT REPLACEMENT     • KNEE SURGERY Left     2 (1 was partial knee replacement)   • NOSE SURGERY      DEVIATED SEPTUM AND SWOLLEN TURBINATES   • SKIN BIOPSY     • THYROIDECTOMY N/A 11/16/2017    Procedure: TOTAL THYROIDECTOMY;  Surgeon: Lencho Kay MD;  Location: [facility];  Service:    • TOOTH EXTRACTION     • WISDOM TOOTH EXTRACTION         Family History   Problem Relation Age of Onset   • Heart attack Mother    • Arrhythmia Father    • Arrhythmia Sister    • Heart attack Maternal Grandmother    • Heart disease Maternal Grandmother    • Heart attack Maternal Grandfather        Social History     Socioeconomic History   • Marital status:      Spouse name: Not on file   • Number of children: Not on file   • Years of education: Not on file   • Highest education level: Not on file   Tobacco Use   • Smoking status: Never Smoker   • Smokeless tobacco: Never Used   Substance and Sexual Activity   • Alcohol use: No     Comment: !x yearly   • Drug use: No   • Sexual activity: Defer     Comment: Hysterectomy           Objective   Physical Exam   Constitutional: She is oriented to person, place, and time. She appears well-developed and well-nourished.   HENT:   Head: Normocephalic.   Right Ear: External ear normal.   Left Ear: External ear normal.   Mouth/Throat: Oropharynx is clear and moist.   Eyes: Pupils are equal, round, and reactive to light. Conjunctivae and EOM are normal.   Neck: Normal range of motion. Neck supple.   Cardiovascular: Normal rate, regular rhythm, normal heart sounds and intact distal pulses.   Pulmonary/Chest: Effort normal and breath sounds normal.   Abdominal: Soft. Bowel sounds are normal.   Musculoskeletal: Normal range of motion.   Neurological: She is alert and oriented to person, place, and time.   Skin: Skin is warm and  dry. Capillary refill takes less than 2 seconds.   Psychiatric: She has a normal mood and affect. Her behavior is normal. Thought content normal.   Nursing note and vitals reviewed.      Procedures           ED Course                                               MDM    Final diagnoses:   Chest pain, unspecified type            Marky Beth, APRN  01/09/20 [id number]

## 2020-01-08 NOTE — ED NOTES
Patient was seen by a healthcare provider and left AMA. Pt was counseled on risks of leaving and cardiac problems and verbalized understanding of risks.      Delbert Jamison, RN  01/08/20 [id number]

## 2020-01-10 ENCOUNTER — APPOINTMENT (OUTPATIENT)
Dept: CARDIOLOGY | Facility: HOSPITAL | Age: 55
End: 2020-01-10

## 2020-01-10 ENCOUNTER — APPOINTMENT (OUTPATIENT)
Dept: NUCLEAR MEDICINE | Facility: HOSPITAL | Age: 55
End: 2020-01-10

## 2020-01-27 ENCOUNTER — HOSPITAL ENCOUNTER (OUTPATIENT)
Dept: NUCLEAR MEDICINE | Facility: HOSPITAL | Age: 55
Discharge: HOME | End: 2020-01-27

## 2020-01-27 ENCOUNTER — HOSPITAL ENCOUNTER (OUTPATIENT)
Dept: CARDIOLOGY | Facility: HOSPITAL | Age: 55
Discharge: HOME | End: 2020-01-27

## 2020-01-27 DIAGNOSIS — R06.02 SHORTNESS OF BREATH: ICD-10-CM

## 2020-01-27 DIAGNOSIS — I20.8 ANGINAL EQUIVALENT (HCC): ICD-10-CM

## 2020-01-27 DIAGNOSIS — R60.0 LOWER EXTREMITY EDEMA: ICD-10-CM

## 2020-01-27 DIAGNOSIS — I48.0 PAROXYSMAL ATRIAL FIBRILLATION (HCC): ICD-10-CM

## 2020-01-27 LAB
BH CV ECHO MEAS - % IVS THICK: 11 %
BH CV ECHO MEAS - % LVPW THICK: 53.9 %
BH CV ECHO MEAS - ACS: 2.2 CM
BH CV ECHO MEAS - AO MAX PG: 9.2 MMHG
BH CV ECHO MEAS - AO MEAN PG: 5.2 MMHG
BH CV ECHO MEAS - AO ROOT AREA (BSA CORRECTED): 1.3
BH CV ECHO MEAS - AO ROOT AREA: 6.4 CM^2
BH CV ECHO MEAS - AO ROOT DIAM: 2.8 CM
BH CV ECHO MEAS - AO V2 MAX: 152 CM/SEC
BH CV ECHO MEAS - AO V2 MEAN: 108.6 CM/SEC
BH CV ECHO MEAS - AO V2 VTI: 34.8 CM
BH CV ECHO MEAS - BSA(HAYCOCK): 2.3 M^2
BH CV ECHO MEAS - BSA: 2.1 M^2
BH CV ECHO MEAS - BZI_BMI: 47.6 KILOGRAMS/M^2
BH CV ECHO MEAS - BZI_METRIC_HEIGHT: 157.5 CM
BH CV ECHO MEAS - BZI_METRIC_WEIGHT: 117.9 KG
BH CV ECHO MEAS - EDV(CUBED): 100 ML
BH CV ECHO MEAS - EDV(MOD-SP4): 70 ML
BH CV ECHO MEAS - EDV(TEICH): 99.4 ML
BH CV ECHO MEAS - EF(CUBED): 55.6 %
BH CV ECHO MEAS - EF(MOD-SP4): 61.4 %
BH CV ECHO MEAS - EF(TEICH): 47.3 %
BH CV ECHO MEAS - ESV(CUBED): 44.5 ML
BH CV ECHO MEAS - ESV(MOD-SP4): 27 ML
BH CV ECHO MEAS - ESV(TEICH): 52.4 ML
BH CV ECHO MEAS - FS: 23.7 %
BH CV ECHO MEAS - IVS/LVPW: 0.99
BH CV ECHO MEAS - IVSD: 1.1 CM
BH CV ECHO MEAS - IVSS: 1.3 CM
BH CV ECHO MEAS - LA DIMENSION: 2.8 CM
BH CV ECHO MEAS - LA/AO: 0.97
BH CV ECHO MEAS - LV DIASTOLIC VOL/BSA (35-75): 32.8 ML/M^2
BH CV ECHO MEAS - LV MASS(C)D: 193.5 GRAMS
BH CV ECHO MEAS - LV MASS(C)DI: 90.5 GRAMS/M^2
BH CV ECHO MEAS - LV MASS(C)S: 199.3 GRAMS
BH CV ECHO MEAS - LV MASS(C)SI: 93.3 GRAMS/M^2
BH CV ECHO MEAS - LV SYSTOLIC VOL/BSA (12-30): 12.6 ML/M^2
BH CV ECHO MEAS - LVIDD: 4.6 CM
BH CV ECHO MEAS - LVIDS: 3.5 CM
BH CV ECHO MEAS - LVLD AP4: 6.9 CM
BH CV ECHO MEAS - LVLS AP4: 6.9 CM
BH CV ECHO MEAS - LVOT AREA (M): 4.5 CM^2
BH CV ECHO MEAS - LVOT AREA: 4.6 CM^2
BH CV ECHO MEAS - LVOT DIAM: 2.4 CM
BH CV ECHO MEAS - LVPWD: 1.1 CM
BH CV ECHO MEAS - LVPWS: 1.8 CM
BH CV ECHO MEAS - MV A MAX VEL: 98.2 CM/SEC
BH CV ECHO MEAS - MV E MAX VEL: 119.5 CM/SEC
BH CV ECHO MEAS - MV E/A: 1.2
BH CV ECHO MEAS - SI(AO): 103.6 ML/M^2
BH CV ECHO MEAS - SI(CUBED): 26 ML/M^2
BH CV ECHO MEAS - SI(MOD-SP4): 20.1 ML/M^2
BH CV ECHO MEAS - SI(TEICH): 22 ML/M^2
BH CV ECHO MEAS - SV(AO): 221.3 ML
BH CV ECHO MEAS - SV(CUBED): 55.6 ML
BH CV ECHO MEAS - SV(MOD-SP4): 43 ML
BH CV ECHO MEAS - SV(TEICH): 47.1 ML
BH CV NUCLEAR PRIOR STUDY: 3
BH CV STRESS BP STAGE 1: NORMAL
BH CV STRESS BP STAGE 2: NORMAL
BH CV STRESS COMMENTS STAGE 1: NORMAL
BH CV STRESS COMMENTS STAGE 2: NORMAL
BH CV STRESS DOSE REGADENOSON STAGE 1: 0.4
BH CV STRESS DURATION MIN STAGE 1: 0
BH CV STRESS DURATION MIN STAGE 2: 4
BH CV STRESS DURATION SEC STAGE 1: 10
BH CV STRESS DURATION SEC STAGE 2: 0
BH CV STRESS HR STAGE 1: 103
BH CV STRESS HR STAGE 2: 79
BH CV STRESS PROTOCOL 1: NORMAL
BH CV STRESS RECOVERY BP: NORMAL MMHG
BH CV STRESS RECOVERY HR: 79 BPM
BH CV STRESS STAGE 1: 1
BH CV STRESS STAGE 2: 2
LV EF NUC BP: 69 %
MAXIMAL PREDICTED HEART RATE: 168 BPM
MAXIMAL PREDICTED HEART RATE: 168 BPM
PERCENT MAX PREDICTED HR: 61.31 %
STRESS BASELINE BP: NORMAL MMHG
STRESS BASELINE HR: 83 BPM
STRESS PERCENT HR: 72 %
STRESS POST PEAK BP: NORMAL MMHG
STRESS POST PEAK HR: 103 BPM
STRESS TARGET HR: 143 BPM
STRESS TARGET HR: 143 BPM

## 2020-01-27 PROCEDURE — 0 TECHNETIUM SESTAMIBI: Performed by: NURSE PRACTITIONER

## 2020-01-27 PROCEDURE — A9500 TC99M SESTAMIBI: HCPCS | Performed by: NURSE PRACTITIONER

## 2020-01-27 PROCEDURE — 93306 TTE W/DOPPLER COMPLETE: CPT | Performed by: INTERNAL MEDICINE

## 2020-01-27 PROCEDURE — 93018 CV STRESS TEST I&R ONLY: CPT | Performed by: INTERNAL MEDICINE

## 2020-01-27 PROCEDURE — 78452 HT MUSCLE IMAGE SPECT MULT: CPT

## 2020-01-27 PROCEDURE — 25010000002 REGADENOSON 0.4 MG/5ML SOLUTION: Performed by: NURSE PRACTITIONER

## 2020-01-27 PROCEDURE — 93306 TTE W/DOPPLER COMPLETE: CPT

## 2020-01-27 PROCEDURE — 78452 HT MUSCLE IMAGE SPECT MULT: CPT | Performed by: INTERNAL MEDICINE

## 2020-01-27 PROCEDURE — 93017 CV STRESS TEST TRACING ONLY: CPT

## 2020-01-27 RX ADMIN — TECHNETIUM TC 99M SESTAMIBI 1 DOSE: 1 INJECTION INTRAVENOUS at 08:20

## 2020-01-27 RX ADMIN — REGADENOSON 0.4 MG: 0.08 INJECTION, SOLUTION INTRAVENOUS at 09:37

## 2020-01-27 RX ADMIN — TECHNETIUM TC 99M SESTAMIBI 1 DOSE: 1 INJECTION INTRAVENOUS at 09:37

## 2020-02-10 ENCOUNTER — OFFICE VISIT (OUTPATIENT)
Dept: CARDIOLOGY | Facility: CLINIC | Age: 55
End: 2020-02-10

## 2020-02-10 VITALS
SYSTOLIC BLOOD PRESSURE: 148 MMHG | HEART RATE: 97 BPM | HEIGHT: 62 IN | BODY MASS INDEX: 49.8 KG/M2 | DIASTOLIC BLOOD PRESSURE: 75 MMHG | WEIGHT: 270.6 LBS | OXYGEN SATURATION: 100 %

## 2020-02-10 DIAGNOSIS — R07.2 PRECORDIAL PAIN: ICD-10-CM

## 2020-02-10 DIAGNOSIS — I10 ESSENTIAL HYPERTENSION: ICD-10-CM

## 2020-02-10 DIAGNOSIS — I48.0 PAROXYSMAL ATRIAL FIBRILLATION (HCC): Primary | ICD-10-CM

## 2020-02-10 DIAGNOSIS — R60.0 LOWER EXTREMITY EDEMA: ICD-10-CM

## 2020-02-10 PROCEDURE — 99213 OFFICE O/P EST LOW 20 MIN: CPT | Performed by: NURSE PRACTITIONER

## 2020-02-10 NOTE — PROGRESS NOTES
Jazzmine Zapata PA-C  Ondina Hayes  1965  02/10/2020    Patient Active Problem List   Diagnosis   • Rapid atrial fibrillation (CMS/HCC)   • Paroxysmal atrial fibrillation (CMS/HCC)   • Hypertension   • Renal cyst       Dear Jazzmine Zapata PA-C:    Subjective     Chief Complaint   Patient presents with   • Results     stress test, echo; ER visit CP 1/2020   • Med Management     verbal           History of Present Illness:    Ondina Hayes is a 54 y.o. female with a past medical history significant for paroxysmal atrial fibrillation and hypertension.  Previously, she was having uncontrolled hypertension, neck and jaw pain, as well as lower extremity edema.  Several medications were adjusted.  The patient also underwent echocardiogram which revealed a normal EF with no significant valvular abnormalities.  Stress test revealed no evidence of ischemia.  Chest x-ray was unremarkable as well as BNP and BMP.  Since that time, the patient reports she has been doing much better.  Her blood pressure has been very well controlled at home.  She did have one episode of chest pain about a month ago which she describes epigastric pain she feels may have been related to her stomach.  She went to the ED for this and troponin was unremarkable.  EKG was normal sinus rhythm.  Since that time, she has been doing great with no further chest pains.  She denies any palpitations, dizziness, shortness of breath, lightheadedness, or syncope.          No Known Allergies:      Current Outpatient Medications:   •  albuterol (PROVENTIL HFA;VENTOLIN HFA) 108 (90 BASE) MCG/ACT inhaler, Inhale 2 puffs As Needed for Wheezing., Disp: , Rfl:   •  amitriptyline (ELAVIL) 100 MG tablet, Take 100 mg by mouth Every Evening., Disp: , Rfl:   •  amLODIPine (NORVASC) 5 MG tablet, Take 1 tablet by mouth Daily., Disp: 30 tablet, Rfl: 5  •  levothyroxine (SYNTHROID) 125 MCG tablet, Take 1 tablet by mouth Daily., Disp: 30 tablet, Rfl: 1  •  metoprolol  tartrate (LOPRESSOR) 100 MG tablet, TAKE ONE TABLET BY MOUTH EVERY 12 HOURS, Disp: 30 tablet, Rfl: 0  •  montelukast (SINGULAIR) 10 MG tablet, Take 10 mg by mouth Every Morning., Disp: , Rfl:   •  pramipexole (MIRAPEX) 1 MG tablet, Take 1 mg by mouth Every Night. Doctors office states to take 1 mg twice daily, patient states that she takes 0.5 mg in the morning and 1 mg at night., Disp: , Rfl:   •  pravastatin (PRAVACHOL) 10 MG tablet, Take 10 mg by mouth Every Night., Disp: , Rfl:   •  rivaroxaban (XARELTO) 20 MG tablet, Take 1 tablet by mouth Daily With Dinner., Disp: 30 tablet, Rfl: 5  •  spironolactone (ALDACTONE) 25 MG tablet, Take 1 tablet by mouth Daily., Disp: 30 tablet, Rfl: 11  •  acetaminophen-codeine (TYLENOL #3) 300-30 MG per tablet, Take 1 tablet by mouth Every 6 (Six) Hours As Needed for Moderate Pain ., Disp: , Rfl:   •  calcitriol (ROCALTROL) 0.25 MCG capsule, Take 1 capsule by mouth 2 (Two) Times a Day., Disp: 60 capsule, Rfl: 1  •  calcitriol (ROCALTROL) 0.25 MCG capsule, Take 1 capsule by mouth 2 (Two) Times a Day., Disp: 60 capsule, Rfl: 1  •  colchicine 0.6 MG tablet, Take 0.6 mg by mouth 2 (Two) Times a Day., Disp: , Rfl:   •  cyclobenzaprine (FLEXERIL) 10 MG tablet, Take 1 tablet by mouth 3 (Three) Times a Day As Needed for Muscle Spasms., Disp: 12 tablet, Rfl: 0  •  febuxostat (ULORIC) 40 MG tablet, Take 40 mg by mouth Daily., Disp: , Rfl:   •  furosemide (LASIX) 20 MG tablet, Take 20 mg by mouth As Needed., Disp: , Rfl:   •  HYDROcodone-acetaminophen (NORCO) 7.5-325 MG per tablet, Take 1 tablet by mouth Every 8 (Eight) Hours As Needed for Moderate Pain ., Disp: 8 tablet, Rfl: 0  •  multivitamin (DAILY ARACELI) tablet tablet, Take 1 tablet by mouth Daily., Disp: , Rfl:       The following portions of the patient's history were reviewed and updated as appropriate: allergies, current medications, past family history, past medical history, past social history, past surgical history and problem  "list.    Social History     Tobacco Use   • Smoking status: Never Smoker   • Smokeless tobacco: Never Used   Substance Use Topics   • Alcohol use: No     Comment: !x yearly   • Drug use: No       Review of Systems   Constitution: Negative for malaise/fatigue.   Cardiovascular: Negative for chest pain, dyspnea on exertion, irregular heartbeat, leg swelling, near-syncope, orthopnea, palpitations, paroxysmal nocturnal dyspnea and syncope.   Respiratory: Negative for cough, shortness of breath and wheezing.    Neurological: Negative for dizziness, light-headedness and weakness.       Objective   Vitals:    02/10/20 1146   BP: 148/75   BP Location: Left arm   Patient Position: Sitting   Cuff Size: Adult   Pulse: 97   SpO2: 100%   Weight: 123 kg (270 lb 9.6 oz)   Height: 157.5 cm (62\")     Body mass index is 49.49 kg/m².        Physical Exam   Constitutional: She is oriented to person, place, and time. She appears well-developed and well-nourished.   HENT:   Head: Normocephalic and atraumatic.   Cardiovascular: Normal rate, regular rhythm and normal heart sounds. Exam reveals no S3 and no S4.   No murmur heard.  Pulmonary/Chest: Effort normal and breath sounds normal. She has no wheezes. She has no rales.   Abdominal: Soft. Bowel sounds are normal.   Musculoskeletal: She exhibits no edema.   Neurological: She is alert and oriented to person, place, and time.   Skin: Skin is warm and dry.   Psychiatric: She has a normal mood and affect. Her behavior is normal.       Lab Results   Component Value Date     01/08/2020    K 4.2 01/08/2020    CL 95 (L) 01/08/2020    CO2 28.6 01/08/2020    BUN 16 01/08/2020    CREATININE 0.70 01/08/2020    GLUCOSE 92 01/08/2020    CALCIUM 8.7 01/08/2020    AST 40 (H) 01/08/2020    ALT 47 (H) 01/08/2020    ALKPHOS 74 01/08/2020    LABIL2 1.2 (L) 03/10/2015     Lab Results   Component Value Date    CKTOTAL 27 07/18/2017     Lab Results   Component Value Date    WBC 6.59 01/08/2020    HGB " 15.0 01/08/2020    HCT 44.7 01/08/2020     01/08/2020     Lab Results   Component Value Date    INR 1.13 (H) 08/07/2017    INR 1.03 07/17/2017    INR 0.99 07/17/2017     Lab Results   Component Value Date    MG 1.7 07/17/2017     Lab Results   Component Value Date    TSH <0.010 (L) 07/17/2017    CHLPL 164 03/10/2015    TRIG 82 03/10/2015    HDL 49 (L) 03/10/2015    LDL 99 03/10/2015             Procedures      Assessment/Plan    Diagnosis Plan   1. Paroxysmal atrial fibrillation (CMS/[facility])     2. Lower extremity edema     3. Essential hypertension     4. Precordial pain                  Recommendations:    1.  I have reviewed stress test, echocardiogram, x-ray, and lab findings with the patient today.    2.  Since she has been asymptomatic and tolerating her medications well, we will continue to monitor for now.    3.  Continue current dose of amlodipine, metoprolol, pravastatin, Xarelto, and spironolactone.    4.  Follow-up in 4 months or sooner if needed.            Return in about 4 months (around 6/10/2020) for Recheck.    As always, I appreciate very much the opportunity to participate in the cardiovascular care of your patients.      With Best Regards,    NHAN Anderson

## 2020-02-26 RX ORDER — AMLODIPINE BESYLATE 10 MG/1
10 TABLET ORAL DAILY
Qty: 90 TABLET | Refills: 0 | Status: SHIPPED | OUTPATIENT
Start: 2020-02-26 | End: 2020-06-10 | Stop reason: SDUPTHER

## 2020-05-29 DIAGNOSIS — I48.0 PAROXYSMAL ATRIAL FIBRILLATION (HCC): ICD-10-CM

## 2020-05-29 RX ORDER — RIVAROXABAN 20 MG/1
TABLET, FILM COATED ORAL
Qty: 30 TABLET | Refills: 1 | Status: SHIPPED | OUTPATIENT
Start: 2020-05-29 | End: 2021-01-25

## 2020-06-03 ENCOUNTER — HOSPITAL ENCOUNTER (EMERGENCY)
Facility: HOSPITAL | Age: 55
Discharge: LEFT BEFORE DISCHARGE | End: 2020-06-04
Attending: EMERGENCY MEDICINE | Admitting: EMERGENCY MEDICINE

## 2020-06-03 ENCOUNTER — APPOINTMENT (OUTPATIENT)
Dept: GENERAL RADIOLOGY | Facility: HOSPITAL | Age: 55
End: 2020-06-03

## 2020-06-03 ENCOUNTER — APPOINTMENT (OUTPATIENT)
Dept: CT IMAGING | Facility: HOSPITAL | Age: 55
End: 2020-06-03

## 2020-06-03 DIAGNOSIS — R51.9 NONINTRACTABLE HEADACHE, UNSPECIFIED CHRONICITY PATTERN, UNSPECIFIED HEADACHE TYPE: Primary | ICD-10-CM

## 2020-06-03 LAB
6-ACETYL MORPHINE: NEGATIVE
ALBUMIN SERPL-MCNC: 4.11 G/DL (ref 3.5–5.2)
ALBUMIN/GLOB SERPL: 1.3 G/DL
ALP SERPL-CCNC: 76 U/L (ref 39–117)
ALT SERPL W P-5'-P-CCNC: 25 U/L (ref 1–33)
AMPHET+METHAMPHET UR QL: NEGATIVE
ANION GAP SERPL CALCULATED.3IONS-SCNC: 13 MMOL/L (ref 5–15)
APAP SERPL-MCNC: <5 MCG/ML (ref 10–30)
APTT PPP: 32.4 SECONDS (ref 25.6–35.3)
AST SERPL-CCNC: 26 U/L (ref 1–32)
ATMOSPHERIC PRESS: 725 MMHG
BARBITURATES UR QL SCN: NEGATIVE
BASE EXCESS BLDV CALC-SCNC: 1.4 MMOL/L (ref 0–2)
BASOPHILS # BLD AUTO: 0.06 10*3/MM3 (ref 0–0.2)
BASOPHILS NFR BLD AUTO: 0.6 % (ref 0–1.5)
BDY SITE: ABNORMAL
BENZODIAZ UR QL SCN: NEGATIVE
BILIRUB SERPL-MCNC: 0.2 MG/DL (ref 0.2–1.2)
BILIRUB UR QL STRIP: NEGATIVE
BODY TEMPERATURE: 37 C
BUN BLD-MCNC: 12 MG/DL (ref 6–20)
BUN/CREAT SERPL: 14.3 (ref 7–25)
BUPRENORPHINE SERPL-MCNC: NEGATIVE NG/ML
CALCIUM SPEC-SCNC: 9.1 MG/DL (ref 8.6–10.5)
CANNABINOIDS SERPL QL: NEGATIVE
CHLORIDE SERPL-SCNC: 103 MMOL/L (ref 98–107)
CLARITY UR: CLEAR
CO2 BLDA-SCNC: 27.5 MMOL/L (ref 22–33)
CO2 SERPL-SCNC: 23 MMOL/L (ref 22–29)
COCAINE UR QL: NEGATIVE
COHGB MFR BLD: 2.6 % (ref 0–5)
COLOR UR: YELLOW
CREAT BLD-MCNC: 0.84 MG/DL (ref 0.57–1)
CRP SERPL-MCNC: 0.74 MG/DL (ref 0–0.5)
DEPRECATED RDW RBC AUTO: 40.5 FL (ref 37–54)
EOSINOPHIL # BLD AUTO: 0.29 10*3/MM3 (ref 0–0.4)
EOSINOPHIL NFR BLD AUTO: 3.1 % (ref 0.3–6.2)
ERYTHROCYTE [DISTWIDTH] IN BLOOD BY AUTOMATED COUNT: 12.5 % (ref 12.3–15.4)
ETHANOL BLD-MCNC: <10 MG/DL (ref 0–10)
ETHANOL UR QL: <0.01 %
GFR SERPL CREATININE-BSD FRML MDRD: 71 ML/MIN/1.73
GLOBULIN UR ELPH-MCNC: 3.1 GM/DL
GLUCOSE BLD-MCNC: 110 MG/DL (ref 65–99)
GLUCOSE BLDC GLUCOMTR-MCNC: 116 MG/DL (ref 70–130)
GLUCOSE UR STRIP-MCNC: NEGATIVE MG/DL
HCO3 BLDV-SCNC: 26.2 MMOL/L (ref 22–28)
HCT VFR BLD AUTO: 40.2 % (ref 34–46.6)
HGB BLD-MCNC: 13.5 G/DL (ref 12–15.9)
HGB BLDA-MCNC: 14 G/DL (ref 13.5–17.5)
HGB UR QL STRIP.AUTO: NEGATIVE
HOLD SPECIMEN: NORMAL
HOROWITZ INDEX BLD+IHG-RTO: 21 %
IMM GRANULOCYTES # BLD AUTO: 0.05 10*3/MM3 (ref 0–0.05)
IMM GRANULOCYTES NFR BLD AUTO: 0.5 % (ref 0–0.5)
INR PPP: 0.89 (ref 0.9–1.1)
KETONES UR QL STRIP: NEGATIVE
LEUKOCYTE ESTERASE UR QL STRIP.AUTO: NEGATIVE
LYMPHOCYTES # BLD AUTO: 1.99 10*3/MM3 (ref 0.7–3.1)
LYMPHOCYTES NFR BLD AUTO: 21.1 % (ref 19.6–45.3)
Lab: ABNORMAL
MAGNESIUM SERPL-MCNC: 1.9 MG/DL (ref 1.6–2.6)
MCH RBC QN AUTO: 29.5 PG (ref 26.6–33)
MCHC RBC AUTO-ENTMCNC: 33.6 G/DL (ref 31.5–35.7)
MCV RBC AUTO: 88 FL (ref 79–97)
METHADONE UR QL SCN: NEGATIVE
METHGB BLD QL: 0.2 % (ref 0–3)
MODALITY: ABNORMAL
MONOCYTES # BLD AUTO: 0.66 10*3/MM3 (ref 0.1–0.9)
MONOCYTES NFR BLD AUTO: 7 % (ref 5–12)
NEUTROPHILS # BLD AUTO: 6.38 10*3/MM3 (ref 1.7–7)
NEUTROPHILS NFR BLD AUTO: 67.7 % (ref 42.7–76)
NITRITE UR QL STRIP: NEGATIVE
NRBC BLD AUTO-RTO: 0 /100 WBC (ref 0–0.2)
OPIATES UR QL: NEGATIVE
OXYCODONE UR QL SCN: NEGATIVE
OXYHGB MFR BLDV: 80.8 % (ref 45–75)
PCO2 BLDV: 41.4 MM HG (ref 41–51)
PCP UR QL SCN: NEGATIVE
PH BLDV: 7.41 PH UNITS (ref 7.32–7.42)
PH UR STRIP.AUTO: 6.5 [PH] (ref 5–8)
PLATELET # BLD AUTO: 300 10*3/MM3 (ref 140–450)
PMV BLD AUTO: 9.7 FL (ref 6–12)
PO2 BLDV: 45.2 MM HG (ref 27–53)
POTASSIUM BLD-SCNC: 3.7 MMOL/L (ref 3.5–5.2)
PROT SERPL-MCNC: 7.2 G/DL (ref 6–8.5)
PROT UR QL STRIP: NEGATIVE
PROTHROMBIN TIME: 11.8 SECONDS (ref 11.9–14.1)
RBC # BLD AUTO: 4.57 10*6/MM3 (ref 3.77–5.28)
SALICYLATES SERPL-MCNC: 0.3 MG/DL
SODIUM BLD-SCNC: 139 MMOL/L (ref 136–145)
SP GR UR STRIP: 1.01 (ref 1–1.03)
T4 FREE SERPL-MCNC: 1.32 NG/DL (ref 0.93–1.7)
TROPONIN T SERPL-MCNC: <0.01 NG/ML (ref 0–0.03)
TSH SERPL DL<=0.05 MIU/L-ACNC: 4.56 UIU/ML (ref 0.27–4.2)
UROBILINOGEN UR QL STRIP: NORMAL
VENTILATOR MODE: ABNORMAL
WBC NRBC COR # BLD: 9.43 10*3/MM3 (ref 3.4–10.8)
WHOLE BLOOD HOLD SPECIMEN: NORMAL
WHOLE BLOOD HOLD SPECIMEN: NORMAL

## 2020-06-03 PROCEDURE — 99285 EMERGENCY DEPT VISIT HI MDM: CPT

## 2020-06-03 PROCEDURE — 80053 COMPREHEN METABOLIC PANEL: CPT | Performed by: EMERGENCY MEDICINE

## 2020-06-03 PROCEDURE — 93005 ELECTROCARDIOGRAM TRACING: CPT | Performed by: EMERGENCY MEDICINE

## 2020-06-03 PROCEDURE — 80307 DRUG TEST PRSMV CHEM ANLYZR: CPT | Performed by: EMERGENCY MEDICINE

## 2020-06-03 PROCEDURE — 93010 ELECTROCARDIOGRAM REPORT: CPT | Performed by: SPECIALIST

## 2020-06-03 PROCEDURE — 85025 COMPLETE CBC W/AUTO DIFF WBC: CPT | Performed by: EMERGENCY MEDICINE

## 2020-06-03 PROCEDURE — 82962 GLUCOSE BLOOD TEST: CPT

## 2020-06-03 PROCEDURE — 25010000002 DROPERIDOL PER 5 MG: Performed by: EMERGENCY MEDICINE

## 2020-06-03 PROCEDURE — 82805 BLOOD GASES W/O2 SATURATION: CPT

## 2020-06-03 PROCEDURE — 70450 CT HEAD/BRAIN W/O DYE: CPT

## 2020-06-03 PROCEDURE — 96374 THER/PROPH/DIAG INJ IV PUSH: CPT

## 2020-06-03 PROCEDURE — 84443 ASSAY THYROID STIM HORMONE: CPT | Performed by: EMERGENCY MEDICINE

## 2020-06-03 PROCEDURE — 85610 PROTHROMBIN TIME: CPT | Performed by: EMERGENCY MEDICINE

## 2020-06-03 PROCEDURE — 86140 C-REACTIVE PROTEIN: CPT | Performed by: EMERGENCY MEDICINE

## 2020-06-03 PROCEDURE — 84439 ASSAY OF FREE THYROXINE: CPT | Performed by: EMERGENCY MEDICINE

## 2020-06-03 PROCEDURE — 81003 URINALYSIS AUTO W/O SCOPE: CPT | Performed by: EMERGENCY MEDICINE

## 2020-06-03 PROCEDURE — P9612 CATHETERIZE FOR URINE SPEC: HCPCS

## 2020-06-03 PROCEDURE — 84484 ASSAY OF TROPONIN QUANT: CPT | Performed by: EMERGENCY MEDICINE

## 2020-06-03 PROCEDURE — 85730 THROMBOPLASTIN TIME PARTIAL: CPT | Performed by: EMERGENCY MEDICINE

## 2020-06-03 PROCEDURE — 71045 X-RAY EXAM CHEST 1 VIEW: CPT

## 2020-06-03 PROCEDURE — 82820 HEMOGLOBIN-OXYGEN AFFINITY: CPT

## 2020-06-03 PROCEDURE — 83735 ASSAY OF MAGNESIUM: CPT | Performed by: EMERGENCY MEDICINE

## 2020-06-03 RX ORDER — ACETAMINOPHEN 500 MG
1000 TABLET ORAL ONCE
Status: COMPLETED | OUTPATIENT
Start: 2020-06-03 | End: 2020-06-03

## 2020-06-03 RX ORDER — DROPERIDOL 2.5 MG/ML
1.25 INJECTION, SOLUTION INTRAMUSCULAR; INTRAVENOUS ONCE
Status: COMPLETED | OUTPATIENT
Start: 2020-06-03 | End: 2020-06-03

## 2020-06-03 RX ADMIN — DROPERIDOL 1.25 MG: 2.5 INJECTION, SOLUTION INTRAMUSCULAR; INTRAVENOUS at 23:48

## 2020-06-03 RX ADMIN — ACETAMINOPHEN 1000 MG: 500 TABLET ORAL at 23:23

## 2020-06-04 ENCOUNTER — APPOINTMENT (OUTPATIENT)
Dept: CT IMAGING | Facility: HOSPITAL | Age: 55
End: 2020-06-04

## 2020-06-04 VITALS
WEIGHT: 260 LBS | HEIGHT: 62 IN | TEMPERATURE: 97.7 F | SYSTOLIC BLOOD PRESSURE: 146 MMHG | BODY MASS INDEX: 47.84 KG/M2 | OXYGEN SATURATION: 99 % | RESPIRATION RATE: 20 BRPM | HEART RATE: 72 BPM | DIASTOLIC BLOOD PRESSURE: 84 MMHG

## 2020-06-04 PROCEDURE — 0 IOVERSOL 74 % SOLUTION: Performed by: EMERGENCY MEDICINE

## 2020-06-04 PROCEDURE — 70496 CT ANGIOGRAPHY HEAD: CPT

## 2020-06-04 RX ADMIN — IOVERSOL 90 ML: 741 INJECTION INTRA-ARTERIAL; INTRAVENOUS at 00:32

## 2020-06-04 NOTE — ED NOTES
Provider at bedside discussing POC with pt and spouse, pt expresses wishes to be discharged home and not be transferred.  All risks and benefits explained to pt and spouse, understanding verbalized.       Keli Delgado RN  06/04/20 0432

## 2020-06-04 NOTE — ED PROVIDER NOTES
Subjective    is here with the patient who gives all of the history.  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  Beforehand he had no knowledge of her feeling unwell and as far as he is concerned there was no issues earlier today with headaches, fevers, vomiting or complaints of pain of any kind.  He states ambulance was called after she passed out and when they arrived she was feeling better and did not want to go.  She came home afterwards and had persistent altered mental status so was brought here.  She follows commands but will not answer questions          Review of Systems   Unable to perform ROS: Mental status change       Past Medical History:   Diagnosis Date   • Acid reflux    • Allergic    • Anxiety    • Asthma    • Atrial fibrillation (CMS/HCC)    • Diastolic dysfunction     Grade I   • Diverticula of colon    • Endometriosis    • Gout    • Hyperlipidemia    • Hypertension    • Hyperthyroidism    • Morbid obesity (CMS/HCC)    • Panic attack    • RA (rheumatoid arthritis) (CMS/HCC)    • Sleep apnea     C-PAP SETTING OF 17, ONLY USES WHEN NOT SLEEPING IN RECLINER    • Spinal headache    • Vitamin D deficiency    • Wears glasses        No Known Allergies    Past Surgical History:   Procedure Laterality Date   •  SECTION      x2   • COLONOSCOPY     • FOOT SURGERY Left     ASHLEY NEUROMA, BONE SPUR AND ACHILLES REPAIR AND PLANTAR FASCITITS    • GASTRIC SLEEVE LAPAROSCOPIC      2 years ago   • HARDWARE REMOVAL FOOT / ANKLE Left    • HYSTERECTOMY      BSO    • JOINT REPLACEMENT     • KNEE SURGERY Left     2 (1 was partial knee replacement)   • NOSE SURGERY      DEVIATED SEPTUM AND SWOLLEN TURBINATES   • SKIN BIOPSY     • THYROIDECTOMY N/A 2017    Procedure: TOTAL THYROIDECTOMY;  Surgeon: Lencho Kay MD;  Location: [facility];  Service:    • TOOTH EXTRACTION     • WISDOM TOOTH EXTRACTION         Family History   Problem Relation Age  of Onset   • Heart attack Mother    • Arrhythmia Father    • Arrhythmia Sister    • Heart attack Maternal Grandmother    • Heart disease Maternal Grandmother    • Heart attack Maternal Grandfather        Social History     Socioeconomic History   • Marital status:      Spouse name: Not on file   • Number of children: Not on file   • Years of education: Not on file   • Highest education level: Not on file   Tobacco Use   • Smoking status: Never Smoker   • Smokeless tobacco: Never Used   Substance and Sexual Activity   • Alcohol use: No     Comment: !x yearly   • Drug use: No   • Sexual activity: Defer     Comment: Hysterectomy           Objective   Physical Exam   Constitutional: She appears well-developed and well-nourished. She appears distressed.   HENT:   Head: Normocephalic and atraumatic.   Nose: Nose normal.   Eyes: Pupils are equal, round, and reactive to light. Conjunctivae and EOM are normal.   Neck: Normal range of motion. Neck supple. No JVD present. No neck rigidity. No tracheal deviation present. No Kernig's sign noted. No thyromegaly present.   Cardiovascular: Normal rate, regular rhythm, normal heart sounds and intact distal pulses. Exam reveals no gallop and no friction rub.   No murmur heard.  Pulmonary/Chest: Effort normal and breath sounds normal. No stridor. No respiratory distress. She has no wheezes. She has no rales.   Abdominal: Soft. Bowel sounds are normal. She exhibits no distension and no mass. There is no tenderness. There is no rebound and no guarding. No hernia.   Musculoskeletal: Normal range of motion. She exhibits no edema, tenderness or deformity.   Lymphadenopathy:     She has no cervical adenopathy.   Neurological: She is disoriented. She exhibits normal muscle tone. Coordination normal.   Difficult neurologic exam no appreciated unilateral weakness.  Patient seems photophobic follows some commands but does not answer questions. GCS of 11   Skin: Skin is warm and dry.  Capillary refill takes less than 2 seconds. No rash noted. She is not diaphoretic. No erythema. No pallor.   Psychiatric: She has a normal mood and affect. Her behavior is normal. Judgment and thought content normal.       Procedures           ED Course  ED Course as of Jun 04 0238   Wed Jun 03, 2020   2247 ECG shows a normal sinus rhythm no acute ST changes normal intervals    [SM]   Thu Jun 04, 2020   0146 Patient is now completely awake and alert GCS of 15 talkative smiling and denies any headaches, photophobia or neck pains at this time.    [SM]      ED Course User Index  [SM] Wolf Emanuel DO                                           MDM  Number of Diagnoses or Management Options     Amount and/or Complexity of Data Reviewed  Clinical lab tests: reviewed and ordered  Tests in the radiology section of CPT®: ordered and reviewed  Tests in the medicine section of CPT®: ordered and reviewed    Risk of Complications, Morbidity, and/or Mortality  Presenting problems: high  General comments: Called for transportation and admission to another hospital with neurology however patient feels back to baseline has no symptoms at this time and wants to go home.  She is alert and oriented x3 fully aware of what is going on and able to make decisions with her .  Informed both of them that she could have an undiagnosed medical condition leading to serious disability or death but they still want to go home.  Advised to follow-up with her primary care physician and return immediately with any worsening headaches, neck pains, fevers, vomiting, altered mental status or any other concerns.    Critical Care  Total time providing critical care: < 30 minutes      Final diagnoses:   Nonintractable headache, unspecified chronicity pattern, unspecified headache type            Wolf Emanuel DO  06/04/20 0238

## 2020-06-04 NOTE — ED NOTES
Called [facility] for [facility] to call back Leonel Clemons  06/04/20 6183

## 2020-06-10 ENCOUNTER — OFFICE VISIT (OUTPATIENT)
Dept: CARDIOLOGY | Facility: CLINIC | Age: 55
End: 2020-06-10

## 2020-06-10 VITALS
WEIGHT: 269.6 LBS | DIASTOLIC BLOOD PRESSURE: 82 MMHG | SYSTOLIC BLOOD PRESSURE: 147 MMHG | BODY MASS INDEX: 49.61 KG/M2 | OXYGEN SATURATION: 98 % | HEART RATE: 69 BPM | TEMPERATURE: 98.9 F | HEIGHT: 62 IN

## 2020-06-10 DIAGNOSIS — I10 ESSENTIAL HYPERTENSION: ICD-10-CM

## 2020-06-10 DIAGNOSIS — R55 SYNCOPE, UNSPECIFIED SYNCOPE TYPE: ICD-10-CM

## 2020-06-10 DIAGNOSIS — I48.0 PAROXYSMAL ATRIAL FIBRILLATION (HCC): Primary | ICD-10-CM

## 2020-06-10 DIAGNOSIS — G47.33 OSA (OBSTRUCTIVE SLEEP APNEA): ICD-10-CM

## 2020-06-10 PROCEDURE — 99214 OFFICE O/P EST MOD 30 MIN: CPT | Performed by: NURSE PRACTITIONER

## 2020-06-10 RX ORDER — PANTOPRAZOLE SODIUM 20 MG/1
20 TABLET, DELAYED RELEASE ORAL DAILY
COMMUNITY
End: 2020-09-01 | Stop reason: ALTCHOICE

## 2020-06-10 RX ORDER — LOSARTAN POTASSIUM 25 MG/1
25 TABLET ORAL DAILY
Qty: 30 TABLET | Refills: 5 | Status: SHIPPED | OUTPATIENT
Start: 2020-06-10 | End: 2020-12-18

## 2020-06-10 RX ORDER — ERGOCALCIFEROL 1.25 MG/1
CAPSULE ORAL
COMMUNITY
Start: 2020-05-13 | End: 2022-05-17

## 2020-06-10 NOTE — PROGRESS NOTES
Jazzmine Zapata PA-C  Ondina Hayes  1965  06/10/2020    Patient Active Problem List   Diagnosis   • Rapid atrial fibrillation (CMS/HCC)   • Paroxysmal atrial fibrillation (CMS/HCC)   • Hypertension   • Renal cyst       Dear Jazzmine Zapata PA-C:    Subjective     Chief Complaint   Patient presents with   • Atrial Fibrillation   • Hypertension           History of Present Illness:    Ondina Hayes is a 54 y.o. female with a past medical history significant for paroxysmal atrial fibrillation and hypertension. She reports one week ago she was involved in a very stressful situation.  She had chest tightness, her blood pressure was markedly elevated and her heart rate was 144.  She then reports she had a syncopal episode and lost consciousness for a few minutes.  She did apparently hit her head when this occurred.  Afterwards, she had altered mental status and confusion for several hours.  She reports her blood pressures typically in the 140s over 70s, however if she has any type of emotional stress her blood pressure becomes markedly elevated.  She denies any further chest pains, shortness of breath, palpitations, dizziness, or lightheadedness.  Stress test and echocardiogram were unremarkable in January 2020.  She denies any bleeding issues with Xarelto.          No Known Allergies:      Current Outpatient Medications:   •  acetaminophen-codeine (TYLENOL #3) 300-30 MG per tablet, Take 1 tablet by mouth Every 6 (Six) Hours As Needed for Moderate Pain ., Disp: , Rfl:   •  albuterol (PROVENTIL HFA;VENTOLIN HFA) 108 (90 BASE) MCG/ACT inhaler, Inhale 2 puffs As Needed for Wheezing., Disp: , Rfl:   •  amitriptyline (ELAVIL) 100 MG tablet, Take 100 mg by mouth Every Evening., Disp: , Rfl:   •  amLODIPine (NORVASC) 5 MG tablet, Take 1 tablet by mouth Daily., Disp: 30 tablet, Rfl: 5  •  levothyroxine (SYNTHROID) 125 MCG tablet, Take 1 tablet by mouth Daily., Disp: 30 tablet, Rfl: 1  •  metoprolol tartrate  (LOPRESSOR) 100 MG tablet, TAKE ONE TABLET BY MOUTH EVERY 12 HOURS, Disp: 30 tablet, Rfl: 0  •  montelukast (SINGULAIR) 10 MG tablet, Take 10 mg by mouth Every Morning., Disp: , Rfl:   •  pantoprazole (PROTONIX) 20 MG EC tablet, Take 20 mg by mouth Daily., Disp: , Rfl:   •  pramipexole (MIRAPEX) 1 MG tablet, Take 1.5 mg by mouth Every Night. Doctors office states to take 1 mg twice daily, patient states that she takes 0.5 mg in the morning and 1 mg at night. , Disp: , Rfl:   •  pravastatin (PRAVACHOL) 10 MG tablet, Take 10 mg by mouth Every Night., Disp: , Rfl:   •  spironolactone (ALDACTONE) 25 MG tablet, Take 1 tablet by mouth Daily., Disp: 30 tablet, Rfl: 11  •  vitamin D (ERGOCALCIFEROL) 1.25 MG (75696 UT) capsule capsule, , Disp: , Rfl:   •  XARELTO 20 MG tablet, TAKE ONE TABLET BY MOUTH DAILY WITH DINNER, Disp: 30 tablet, Rfl: 1  •  cyclobenzaprine (FLEXERIL) 10 MG tablet, Take 1 tablet by mouth 3 (Three) Times a Day As Needed for Muscle Spasms., Disp: 12 tablet, Rfl: 0  •  febuxostat (ULORIC) 40 MG tablet, Take 40 mg by mouth Daily., Disp: , Rfl:   •  losartan (COZAAR) 25 MG tablet, Take 1 tablet by mouth Daily., Disp: 30 tablet, Rfl: 5      The following portions of the patient's history were reviewed and updated as appropriate: allergies, current medications, past family history, past medical history, past social history, past surgical history and problem list.    Social History     Tobacco Use   • Smoking status: Current Some Day Smoker   • Smokeless tobacco: Never Used   Substance Use Topics   • Alcohol use: No     Comment: !x yearly   • Drug use: No       Review of Systems   Constitution: Negative for decreased appetite and malaise/fatigue.   Cardiovascular: Negative for chest pain, dyspnea on exertion, irregular heartbeat, leg swelling, near-syncope, orthopnea, palpitations, paroxysmal nocturnal dyspnea and syncope.   Respiratory: Negative for cough, shortness of breath and wheezing.    Neurological:  "Negative for dizziness, light-headedness and weakness.       Objective   Vitals:    06/10/20 0850   BP: 147/82   BP Location: Right arm   Pulse: 69   Temp: 98.9 °F (37.2 °C)   SpO2: 98%   Weight: 122 kg (269 lb 9.6 oz)   Height: 157.5 cm (62\")     Body mass index is 49.31 kg/m².        Physical Exam   Constitutional: She is oriented to person, place, and time. She appears well-developed and well-nourished.   HENT:   Head: Normocephalic and atraumatic.   Cardiovascular: Normal rate, regular rhythm and normal heart sounds. Exam reveals no S3 and no S4.   No murmur heard.  Pulmonary/Chest: Effort normal and breath sounds normal. She has no wheezes. She has no rales.   Abdominal: Soft. Bowel sounds are normal.   Musculoskeletal: She exhibits no edema.   Neurological: She is alert and oriented to person, place, and time.   Skin: Skin is warm and dry.   Psychiatric: She has a normal mood and affect. Her behavior is normal.       Lab Results   Component Value Date     06/03/2020    K 3.7 06/03/2020     06/03/2020    CO2 23.0 06/03/2020    BUN 12 06/03/2020    CREATININE 0.84 06/03/2020    GLUCOSE 110 (H) 06/03/2020    CALCIUM 9.1 06/03/2020    AST 26 06/03/2020    ALT 25 06/03/2020    ALKPHOS 76 06/03/2020    LABIL2 1.2 (L) 03/10/2015     Lab Results   Component Value Date    CKTOTAL 27 07/18/2017     Lab Results   Component Value Date    WBC 9.43 06/03/2020    HGB 13.5 06/03/2020    HCT 40.2 06/03/2020     06/03/2020     Lab Results   Component Value Date    INR 0.89 (L) 06/03/2020    INR 1.13 (H) 08/07/2017    INR 1.03 07/17/2017     Lab Results   Component Value Date    MG 1.9 06/03/2020     Lab Results   Component Value Date    TSH 4.560 (H) 06/03/2020    CHLPL 164 03/10/2015    TRIG 82 03/10/2015    HDL 49 (L) 03/10/2015    LDL 99 03/10/2015            Procedures      Assessment/Plan    Diagnosis Plan   1. Paroxysmal atrial fibrillation (CMS/HCC)  Cardiac Event Monitor   2. Essential hypertension  " losartan (COZAAR) 25 MG tablet    Basic Metabolic Panel   3. MICHEL (obstructive sleep apnea)     4. Syncope, unspecified syncope type  Cardiac Event Monitor    US Carotid Bilateral                Recommendations:    1.  We will evaluate her syncopal episode further with a 30-day cardiac event monitor and carotid Doppler.    2.  For her uncontrolled hypertension, will initiate losartan 25 mg daily.    3.  BMP in 1 week.    4.  Follow-up in 6 weeks or sooner if needed.       Return in about 6 weeks (around 7/22/2020) for Recheck.    As always, I appreciate very much the opportunity to participate in the cardiovascular care of your patients.      With Best Regards,    Lissette Denis, APRN

## 2020-06-15 ENCOUNTER — TELEPHONE (OUTPATIENT)
Dept: CARDIOLOGY | Facility: CLINIC | Age: 55
End: 2020-06-15

## 2020-06-15 NOTE — TELEPHONE ENCOUNTER
Called pt to advise them they can come by the office any day inbewteen 8 am and 11 am to have there monitor put on or we can mail it. If they want it mailed confirm their address.      She stated to mail it to her.

## 2020-06-19 ENCOUNTER — APPOINTMENT (OUTPATIENT)
Dept: CARDIOLOGY | Facility: HOSPITAL | Age: 55
End: 2020-06-19

## 2020-06-23 PROCEDURE — 93270 REMOTE 30 DAY ECG REV/REPORT: CPT | Performed by: INTERNAL MEDICINE

## 2020-06-26 ENCOUNTER — LAB (OUTPATIENT)
Dept: LAB | Facility: HOSPITAL | Age: 55
End: 2020-06-26

## 2020-06-26 ENCOUNTER — HOSPITAL ENCOUNTER (OUTPATIENT)
Dept: CARDIOLOGY | Facility: HOSPITAL | Age: 55
Discharge: HOME | End: 2020-06-26
Admitting: NURSE PRACTITIONER

## 2020-06-26 DIAGNOSIS — R55 SYNCOPE, UNSPECIFIED SYNCOPE TYPE: ICD-10-CM

## 2020-06-26 DIAGNOSIS — I10 ESSENTIAL HYPERTENSION: ICD-10-CM

## 2020-06-26 PROCEDURE — 93880 EXTRACRANIAL BILAT STUDY: CPT

## 2020-06-26 PROCEDURE — 93880 EXTRACRANIAL BILAT STUDY: CPT | Performed by: RADIOLOGY

## 2020-06-26 PROCEDURE — 36415 COLL VENOUS BLD VENIPUNCTURE: CPT

## 2020-06-26 PROCEDURE — 80048 BASIC METABOLIC PNL TOTAL CA: CPT

## 2020-06-27 LAB
ANION GAP SERPL CALCULATED.3IONS-SCNC: 13.4 MMOL/L (ref 5–15)
BUN BLD-MCNC: 11 MG/DL (ref 6–20)
BUN/CREAT SERPL: 18.3 (ref 7–25)
CALCIUM SPEC-SCNC: 9.3 MG/DL (ref 8.6–10.5)
CHLORIDE SERPL-SCNC: 96 MMOL/L (ref 98–107)
CO2 SERPL-SCNC: 23.6 MMOL/L (ref 22–29)
CREAT BLD-MCNC: 0.6 MG/DL (ref 0.57–1)
GFR SERPL CREATININE-BSD FRML MDRD: 105 ML/MIN/1.73
GLUCOSE BLD-MCNC: 94 MG/DL (ref 65–99)
POTASSIUM BLD-SCNC: 4 MMOL/L (ref 3.5–5.2)
SODIUM BLD-SCNC: 133 MMOL/L (ref 136–145)

## 2020-06-30 ENCOUNTER — TRANSCRIBE ORDERS (OUTPATIENT)
Dept: ADMINISTRATIVE | Facility: HOSPITAL | Age: 55
End: 2020-06-30

## 2020-06-30 DIAGNOSIS — R13.10 PROBLEMS WITH SWALLOWING AND MASTICATION: Primary | ICD-10-CM

## 2020-07-03 ENCOUNTER — TRANSCRIBE ORDERS (OUTPATIENT)
Dept: GENERAL RADIOLOGY | Facility: HOSPITAL | Age: 55
End: 2020-07-03

## 2020-07-03 ENCOUNTER — TRANSCRIBE ORDERS (OUTPATIENT)
Dept: ADMINISTRATIVE | Facility: HOSPITAL | Age: 55
End: 2020-07-03

## 2020-07-03 DIAGNOSIS — Z01.818 PRE-OPERATIVE CLEARANCE: Primary | ICD-10-CM

## 2020-07-06 ENCOUNTER — LAB (OUTPATIENT)
Dept: LAB | Facility: HOSPITAL | Age: 55
End: 2020-07-06

## 2020-07-06 DIAGNOSIS — Z01.818 PRE-OPERATIVE CLEARANCE: ICD-10-CM

## 2020-07-06 LAB
REF LAB TEST METHOD: NORMAL
SARS-COV-2 RNA RESP QL NAA+PROBE: NOT DETECTED

## 2020-07-06 PROCEDURE — U0002 COVID-19 LAB TEST NON-CDC: HCPCS

## 2020-07-06 PROCEDURE — C9803 HOPD COVID-19 SPEC COLLECT: HCPCS

## 2020-07-06 PROCEDURE — U0004 COV-19 TEST NON-CDC HGH THRU: HCPCS

## 2020-07-08 ENCOUNTER — HOSPITAL ENCOUNTER (OUTPATIENT)
Dept: GENERAL RADIOLOGY | Facility: HOSPITAL | Age: 55
Discharge: HOME | End: 2020-07-08
Admitting: NURSE PRACTITIONER

## 2020-07-08 DIAGNOSIS — R13.10 PROBLEMS WITH SWALLOWING AND MASTICATION: ICD-10-CM

## 2020-07-08 PROCEDURE — 74220 X-RAY XM ESOPHAGUS 1CNTRST: CPT | Performed by: RADIOLOGY

## 2020-07-08 PROCEDURE — 74220 X-RAY XM ESOPHAGUS 1CNTRST: CPT

## 2020-07-14 DIAGNOSIS — R55 SYNCOPE, UNSPECIFIED SYNCOPE TYPE: ICD-10-CM

## 2020-07-14 DIAGNOSIS — I48.0 PAROXYSMAL ATRIAL FIBRILLATION (HCC): ICD-10-CM

## 2020-08-03 ENCOUNTER — TREATMENT (OUTPATIENT)
Dept: CARDIOLOGY | Facility: CLINIC | Age: 55
End: 2020-08-03

## 2020-08-03 DIAGNOSIS — R55 SYNCOPE AND COLLAPSE: Primary | ICD-10-CM

## 2020-08-03 DIAGNOSIS — I48.0 PAROXYSMAL ATRIAL FIBRILLATION (HCC): ICD-10-CM

## 2020-08-03 PROCEDURE — 93272 ECG/REVIEW INTERPRET ONLY: CPT | Performed by: INTERNAL MEDICINE

## 2020-09-01 ENCOUNTER — OFFICE VISIT (OUTPATIENT)
Dept: CARDIOLOGY | Facility: CLINIC | Age: 55
End: 2020-09-01

## 2020-09-01 VITALS — DIASTOLIC BLOOD PRESSURE: 83 MMHG | HEART RATE: 78 BPM | SYSTOLIC BLOOD PRESSURE: 134 MMHG

## 2020-09-01 DIAGNOSIS — I48.0 PAROXYSMAL ATRIAL FIBRILLATION (HCC): Primary | ICD-10-CM

## 2020-09-01 DIAGNOSIS — R55 SYNCOPE AND COLLAPSE: ICD-10-CM

## 2020-09-01 DIAGNOSIS — I10 ESSENTIAL HYPERTENSION: ICD-10-CM

## 2020-09-01 DIAGNOSIS — G47.33 OSA (OBSTRUCTIVE SLEEP APNEA): ICD-10-CM

## 2020-09-01 DIAGNOSIS — E78.5 DYSLIPIDEMIA: ICD-10-CM

## 2020-09-01 PROCEDURE — 99442 PR PHYS/QHP TELEPHONE EVALUATION 11-20 MIN: CPT | Performed by: NURSE PRACTITIONER

## 2020-09-01 RX ORDER — BACLOFEN 10 MG/1
10 TABLET ORAL
COMMUNITY

## 2020-09-01 RX ORDER — LEVOTHYROXINE SODIUM 0.15 MG/1
150 TABLET ORAL DAILY
COMMUNITY

## 2020-09-01 RX ORDER — ALLOPURINOL 100 MG/1
100 TABLET ORAL DAILY
COMMUNITY

## 2020-09-01 NOTE — PROGRESS NOTES
You have chosen to receive care through a telephone visit. Do you consent to use a telephone visit for your medical care today? Yes  Jazzmine Zapata PA-C  Ondina Hayes  1965 09/01/2020    Patient Active Problem List   Diagnosis   • Rapid atrial fibrillation (CMS/HCC)   • Paroxysmal atrial fibrillation (CMS/HCC)   • Hypertension   • Renal cyst       Dear Jazzmine Zapata PA-C:    Subjective     Chief Complaint   Patient presents with   • Edema   • Dizziness   • Syncope   • Palpitations   • Med Management     over the phone.            History of Present Illness:    Ondina Hayes is a 54 y.o. female with a past medical history significant for paroxysmal atrial fibrillation and hypertension.  She recently had a syncopal episode and therefore was evaluated further with a 30-day event monitor.  This revealed predominantly sinus rhythm with heart rate ranging from 55 to 130 bpm.  The patient had chest pain, fatigue, and palpitations during the monitoring period which did not correlate with any EKG changes.  She reports she did not have a syncopal episode while wearing the monitor but she did have near syncopal episodes with no significant tachy or jordin arrhythmias noted.  She does report her TSH was abnormal and her PCP has been adjusting thyroid medications.  Since this time she has been feeling much more energetic.  She has also been referred to her neurologist because there is some question about her possibly having a diagnosis of multiple sclerosis.  Her blood pressure has also improved since initiating losartan 25 mg daily.          No Known Allergies:      Current Outpatient Medications:   •  acetaminophen-codeine (TYLENOL #3) 300-30 MG per tablet, Take 1 tablet by mouth Every 6 (Six) Hours As Needed for Moderate Pain ., Disp: , Rfl:   •  albuterol (PROVENTIL HFA;VENTOLIN HFA) 108 (90 BASE) MCG/ACT inhaler, Inhale 2 puffs As Needed for Wheezing., Disp: , Rfl:   •  allopurinol (ZYLOPRIM) 100 MG tablet,  Take 100 mg by mouth Daily., Disp: , Rfl:   •  amitriptyline (ELAVIL) 100 MG tablet, Take 100 mg by mouth Every Evening., Disp: , Rfl:   •  amLODIPine (NORVASC) 5 MG tablet, Take 1 tablet by mouth Daily., Disp: 30 tablet, Rfl: 5  •  baclofen (LIORESAL) 10 MG tablet, Take 10 mg by mouth every night at bedtime., Disp: , Rfl:   •  levothyroxine (SYNTHROID, LEVOTHROID) 137 MCG tablet, Take 137 mcg by mouth Daily., Disp: , Rfl:   •  losartan (COZAAR) 25 MG tablet, Take 1 tablet by mouth Daily., Disp: 30 tablet, Rfl: 5  •  metoprolol tartrate (LOPRESSOR) 100 MG tablet, TAKE ONE TABLET BY MOUTH EVERY 12 HOURS (Patient taking differently: Take 100 mg by mouth 2 (Two) Times a Day.), Disp: 30 tablet, Rfl: 0  •  montelukast (SINGULAIR) 10 MG tablet, Take 10 mg by mouth Every Morning., Disp: , Rfl:   •  pramipexole (MIRAPEX) 1 MG tablet, Take 1.5 mg by mouth Every Night. Doctors office states to take 1 mg twice daily, patient states that she takes 0.5 mg in the morning and 1 mg at night. , Disp: , Rfl:   •  pravastatin (PRAVACHOL) 10 MG tablet, Take 10 mg by mouth Every Night., Disp: , Rfl:   •  spironolactone (ALDACTONE) 25 MG tablet, Take 1 tablet by mouth Daily., Disp: 30 tablet, Rfl: 11  •  vitamin D (ERGOCALCIFEROL) 1.25 MG (39611 UT) capsule capsule, , Disp: , Rfl:   •  XARELTO 20 MG tablet, TAKE ONE TABLET BY MOUTH DAILY WITH DINNER, Disp: 30 tablet, Rfl: 1      The following portions of the patient's history were reviewed and updated as appropriate: allergies, current medications, past family history, past medical history, past social history, past surgical history and problem list.    Social History     Tobacco Use   • Smoking status: Current Some Day Smoker   • Smokeless tobacco: Never Used   Substance Use Topics   • Alcohol use: No     Comment: !x yearly   • Drug use: No       Review of Systems   Constitution: Negative for decreased appetite and malaise/fatigue.   Cardiovascular: Positive for near-syncope. Negative  for chest pain, dyspnea on exertion, irregular heartbeat, leg swelling, orthopnea, palpitations, paroxysmal nocturnal dyspnea and syncope.   Respiratory: Negative for cough, shortness of breath and wheezing.    Neurological: Negative for dizziness, light-headedness and weakness.       Objective   Vitals:    09/01/20 0946   BP: 134/83   BP Location: Left arm   Patient Position: Sitting   Cuff Size: Adult   Pulse: 78     There is no height or weight on file to calculate BMI.        Physical Exam    Lab Results   Component Value Date     (L) 06/26/2020    K 4.0 06/26/2020    CL 96 (L) 06/26/2020    CO2 23.6 06/26/2020    BUN 11 06/26/2020    CREATININE 0.60 06/26/2020    GLUCOSE 94 06/26/2020    CALCIUM 9.3 06/26/2020    AST 26 06/03/2020    ALT 25 06/03/2020    ALKPHOS 76 06/03/2020    LABIL2 1.2 (L) 03/10/2015     Lab Results   Component Value Date    CKTOTAL 27 07/18/2017     Lab Results   Component Value Date    WBC 9.43 06/03/2020    HGB 13.5 06/03/2020    HCT 40.2 06/03/2020     06/03/2020     Lab Results   Component Value Date    INR 0.89 (L) 06/03/2020    INR 1.13 (H) 08/07/2017    INR 1.03 07/17/2017     Lab Results   Component Value Date    MG 1.9 06/03/2020     Lab Results   Component Value Date    TSH 4.560 (H) 06/03/2020    CHLPL 164 03/10/2015    TRIG 82 03/10/2015    HDL 49 (L) 03/10/2015    LDL 99 03/10/2015      No results found for: BNP        Procedures      Assessment/Plan    Diagnosis Plan   1. Paroxysmal atrial fibrillation (CMS/[facility])     2. Syncope and collapse     3. Essential hypertension     4. MICHEL (obstructive sleep apnea)     5. Dyslipidemia                  Recommendations:    1.  I discussed the results of the 30-day event monitor with the patient at length today.    2.  If she continues to have syncopal episodes, will consider loop recorder implantation.    3.  Follow-up in 6 months or sooner if needed.    This visit has been rescheduled as a phone visit to comply with patient  safety concerns in accordance with CDC recommendations. Total time of discussion was 12 minutes.        Return in about 6 months (around 3/1/2021) for Recheck.    As always, I appreciate very much the opportunity to participate in the cardiovascular care of your patients.      With Best Regards,    NHAN Anderson

## 2020-12-08 DIAGNOSIS — I10 ESSENTIAL HYPERTENSION: ICD-10-CM

## 2020-12-08 DIAGNOSIS — R60.0 LOWER EXTREMITY EDEMA: ICD-10-CM

## 2020-12-08 RX ORDER — SPIRONOLACTONE 25 MG/1
TABLET ORAL
Qty: 90 TABLET | Refills: 10 | Status: SHIPPED | OUTPATIENT
Start: 2020-12-08 | End: 2022-05-11 | Stop reason: SDUPTHER

## 2020-12-18 DIAGNOSIS — I10 ESSENTIAL HYPERTENSION: ICD-10-CM

## 2020-12-18 RX ORDER — LOSARTAN POTASSIUM 25 MG/1
TABLET ORAL
Qty: 30 TABLET | Refills: 4 | Status: SHIPPED | OUTPATIENT
Start: 2020-12-18 | End: 2021-05-27

## 2021-01-24 DIAGNOSIS — I48.0 PAROXYSMAL ATRIAL FIBRILLATION (HCC): ICD-10-CM

## 2021-01-25 RX ORDER — RIVAROXABAN 20 MG/1
TABLET, FILM COATED ORAL
Qty: 30 TABLET | Refills: 5 | Status: SHIPPED | OUTPATIENT
Start: 2021-01-25 | End: 2022-05-19 | Stop reason: SDUPTHER

## 2021-03-18 ENCOUNTER — BULK ORDERING (OUTPATIENT)
Dept: CASE MANAGEMENT | Facility: OTHER | Age: 56
End: 2021-03-18

## 2021-03-18 DIAGNOSIS — Z23 IMMUNIZATION DUE: ICD-10-CM

## 2021-04-07 ENCOUNTER — OFFICE VISIT (OUTPATIENT)
Dept: CARDIOLOGY | Facility: CLINIC | Age: 56
End: 2021-04-07

## 2021-04-07 VITALS
OXYGEN SATURATION: 98 % | BODY MASS INDEX: 51.3 KG/M2 | HEIGHT: 62 IN | DIASTOLIC BLOOD PRESSURE: 87 MMHG | RESPIRATION RATE: 16 BRPM | SYSTOLIC BLOOD PRESSURE: 139 MMHG | TEMPERATURE: 97.1 F | WEIGHT: 278.8 LBS | HEART RATE: 79 BPM

## 2021-04-07 DIAGNOSIS — G47.33 OSA (OBSTRUCTIVE SLEEP APNEA): ICD-10-CM

## 2021-04-07 DIAGNOSIS — R07.2 PRECORDIAL PAIN: ICD-10-CM

## 2021-04-07 DIAGNOSIS — I10 ESSENTIAL HYPERTENSION: ICD-10-CM

## 2021-04-07 DIAGNOSIS — I48.0 PAROXYSMAL ATRIAL FIBRILLATION (HCC): Primary | ICD-10-CM

## 2021-04-07 PROCEDURE — 93000 ELECTROCARDIOGRAM COMPLETE: CPT | Performed by: NURSE PRACTITIONER

## 2021-04-07 PROCEDURE — 99214 OFFICE O/P EST MOD 30 MIN: CPT | Performed by: NURSE PRACTITIONER

## 2021-04-07 RX ORDER — ISOSORBIDE MONONITRATE 30 MG/1
30 TABLET, EXTENDED RELEASE ORAL DAILY
Qty: 30 TABLET | Refills: 5 | Status: SHIPPED | OUTPATIENT
Start: 2021-04-07 | End: 2022-05-17

## 2021-04-07 NOTE — PROGRESS NOTES
Jazzmine Zapata PA-C  Ondina Hayes  1965 04/07/2021    Patient Active Problem List   Diagnosis   • Rapid atrial fibrillation (CMS/HCC)   • Paroxysmal atrial fibrillation (CMS/HCC)   • Hypertension   • Renal cyst       Dear Jazzmine Zapata PA-C:    Subjective     Chief Complaint   Patient presents with   • Chest Pain     patient has had some flutter feeling in the chest last week   • Med Management     verbal           History of Present Illness:    Ondina Hayes is a 55 y.o. female with a past medical history significant for paroxysmal atrial fibrillation and hypertension. She presents today for routine cardiology follow up.  She continues to have intermittent palpitations which were present during her last event monitor and did not correlate with any cardiac arrhythmias.  She has an extensive history of GI disturbance including esophageal spasms and dysphagia for which she takes several medications.  She has been tolerating her medications well and reports blood pressure has been very well controlled at home.  She does report one episode of chest tightness a few nights ago which lasted several minutes.  This occurred while she was lying down and did not occur with exertion.  This relieved spontaneously.  She had no chest pains prior to this or after this event.        No Known Allergies:      Current Outpatient Medications:   •  acetaminophen-codeine (TYLENOL #3) 300-30 MG per tablet, Take 1 tablet by mouth Every 6 (Six) Hours As Needed for Moderate Pain ., Disp: , Rfl:   •  albuterol (PROVENTIL HFA;VENTOLIN HFA) 108 (90 BASE) MCG/ACT inhaler, Inhale 2 puffs As Needed for Wheezing., Disp: , Rfl:   •  allopurinol (ZYLOPRIM) 100 MG tablet, Take 100 mg by mouth Daily., Disp: , Rfl:   •  amitriptyline (ELAVIL) 100 MG tablet, Take 100 mg by mouth Every Evening., Disp: , Rfl:   •  baclofen (LIORESAL) 10 MG tablet, Take 10 mg by mouth every night at bedtime. 10mg at night and 5 mg in the AM, Disp: , Rfl:   •   levothyroxine (SYNTHROID, LEVOTHROID) 137 MCG tablet, Take 137 mcg by mouth Daily., Disp: , Rfl:   •  losartan (COZAAR) 25 MG tablet, TAKE ONE TABLET BY MOUTH DAILY, Disp: 30 tablet, Rfl: 4  •  metoprolol tartrate (LOPRESSOR) 100 MG tablet, TAKE ONE TABLET BY MOUTH EVERY 12 HOURS (Patient taking differently: Take 100 mg by mouth 2 (Two) Times a Day.), Disp: 30 tablet, Rfl: 0  •  montelukast (SINGULAIR) 10 MG tablet, Take 10 mg by mouth Every Morning., Disp: , Rfl:   •  pramipexole (MIRAPEX) 1 MG tablet, Take 1.5 mg by mouth Every Night. Doctors office states to take 1 mg twice daily, patient states that she takes 0.5 mg in the morning and 1 mg at night. , Disp: , Rfl:   •  pravastatin (PRAVACHOL) 10 MG tablet, Take 10 mg by mouth Every Night., Disp: , Rfl:   •  spironolactone (ALDACTONE) 25 MG tablet, TAKE ONE TABLET BY MOUTH DAILY, Disp: 90 tablet, Rfl: 10  •  vitamin D (ERGOCALCIFEROL) 1.25 MG (10585 UT) capsule capsule, , Disp: , Rfl:   •  Xarelto 20 MG tablet, TAKE ONE TABLET BY MOUTH DAILY WITH DINNER, Disp: 30 tablet, Rfl: 5  •  isosorbide mononitrate (IMDUR) 30 MG 24 hr tablet, Take 1 tablet by mouth Daily., Disp: 30 tablet, Rfl: 5      The following portions of the patient's history were reviewed and updated as appropriate: allergies, current medications, past family history, past medical history, past social history, past surgical history and problem list.    Social History     Tobacco Use   • Smoking status: Current Some Day Smoker   • Smokeless tobacco: Never Used   Substance Use Topics   • Alcohol use: No     Comment: !x yearly   • Drug use: No       Review of Systems   Constitutional: Negative for decreased appetite and malaise/fatigue.   Cardiovascular: Positive for chest pain and palpitations. Negative for dyspnea on exertion, irregular heartbeat, leg swelling, near-syncope, orthopnea, paroxysmal nocturnal dyspnea and syncope.   Respiratory: Negative for cough, shortness of breath and wheezing.   "  Neurological: Negative for dizziness, light-headedness and weakness.       Objective   Vitals:    04/07/21 0904   BP: 139/87   BP Location: Right arm   Patient Position: Sitting   Cuff Size: Large Adult   Pulse: 79   Resp: 16   Temp: 97.1 °F (36.2 °C)   TempSrc: Temporal   SpO2: 98%   Weight: 126 kg (278 lb 12.8 oz)   Height: 157.5 cm (62.01\")     Body mass index is 50.98 kg/m².        Vitals reviewed.   Constitutional:       Appearance: Healthy appearance. Well-developed and not in distress.   HENT:      Head: Normocephalic and atraumatic.   Neck:      Vascular: No JVD.   Pulmonary:      Effort: Pulmonary effort is normal.      Breath sounds: Normal breath sounds. No wheezing. No rales.   Cardiovascular:      Normal rate. Regular rhythm.      Murmurs: There is no murmur.      . No S3 and S4 gallop.   Edema:     Peripheral edema absent.   Abdominal:      General: Bowel sounds are normal.      Palpations: Abdomen is soft.   Skin:     General: Skin is warm and dry.   Neurological:      Mental Status: Alert, oriented to person, place, and time and oriented to person, place and time.   Psychiatric:         Mood and Affect: Mood normal.         Behavior: Behavior normal.         Lab Results   Component Value Date     (L) 06/26/2020    K 4.0 06/26/2020    CL 96 (L) 06/26/2020    CO2 23.6 06/26/2020    BUN 11 06/26/2020    CREATININE 0.60 06/26/2020    GLUCOSE 94 06/26/2020    CALCIUM 9.3 06/26/2020    AST 26 06/03/2020    ALT 25 06/03/2020    ALKPHOS 76 06/03/2020    LABIL2 1.2 (L) 03/10/2015     Lab Results   Component Value Date    CKTOTAL 27 07/18/2017     Lab Results   Component Value Date    WBC 9.43 06/03/2020    HGB 13.5 06/03/2020    HCT 40.2 06/03/2020     06/03/2020     Lab Results   Component Value Date    INR 0.89 (L) 06/03/2020    INR 1.13 (H) 08/07/2017    INR 1.03 07/17/2017     Lab Results   Component Value Date    MG 1.9 06/03/2020     Lab Results   Component Value Date    TSH 4.560 (H) " 06/03/2020    CHLPL 164 03/10/2015    TRIG 82 03/10/2015    HDL 49 (L) 03/10/2015    LDL 99 03/10/2015      No results found for: BNP          ECG 12 Lead    Date/Time: 4/7/2021 9:07 AM  Performed by: Lissette Denis APRN  Authorized by: Lissette Denis APRN   Comparison: compared with previous ECG   Similar to previous ECG  Rhythm: sinus rhythm  BPM: 78                Assessment/Plan    Diagnosis Plan   1. Paroxysmal atrial fibrillation (CMS/HCC)  ECG 12 Lead   2. Essential hypertension  ECG 12 Lead   3. MICHEL (obstructive sleep apnea)  ECG 12 Lead   4. Precordial pain  ECG 12 Lead    isosorbide mononitrate (IMDUR) 30 MG 24 hr tablet                Recommendations:    1.  We have discussed options for her recent chest pain including stress test.  However, since this occurred once she would like to try isosorbide first.  If she has any further episodes of chest pain she will let us know and we will proceed with nuclear stress test.    2.  Her palpitations have not correlated with any significant arrhythmias, therefore we will continue the current dose of metoprolol.    3.  She does report her PCP obtain labs about 1 month ago.  We will try to obtain these.    4.  Follow-up in 3 months or sooner if needed.          Return in about 3 months (around 7/7/2021) for Recheck.    As always, I appreciate very much the opportunity to participate in the cardiovascular care of your patients.      With Best Regards,    NHAN Anderson

## 2021-05-12 ENCOUNTER — TRANSCRIBE ORDERS (OUTPATIENT)
Dept: ADMINISTRATIVE | Facility: HOSPITAL | Age: 56
End: 2021-05-12

## 2021-05-12 ENCOUNTER — HOSPITAL ENCOUNTER (OUTPATIENT)
Dept: CARDIOLOGY | Facility: HOSPITAL | Age: 56
Discharge: HOME | End: 2021-05-12
Admitting: PHYSICIAN ASSISTANT

## 2021-05-12 DIAGNOSIS — R20.2 PARESTHESIA: ICD-10-CM

## 2021-05-12 DIAGNOSIS — I77.1 STRICTURE OF ARTERY (HCC): ICD-10-CM

## 2021-05-12 DIAGNOSIS — I77.1 STRICTURE OF ARTERY (HCC): Primary | ICD-10-CM

## 2021-05-12 PROCEDURE — 93931 UPPER EXTREMITY STUDY: CPT

## 2021-05-12 PROCEDURE — 93931 UPPER EXTREMITY STUDY: CPT | Performed by: RADIOLOGY

## 2021-05-14 ENCOUNTER — HOSPITAL ENCOUNTER (OUTPATIENT)
Dept: GENERAL RADIOLOGY | Facility: HOSPITAL | Age: 56
Discharge: HOME | End: 2021-05-14

## 2021-05-14 ENCOUNTER — TRANSCRIBE ORDERS (OUTPATIENT)
Dept: ADMINISTRATIVE | Facility: HOSPITAL | Age: 56
End: 2021-05-14

## 2021-05-14 DIAGNOSIS — R07.9 CHEST PAIN, UNSPECIFIED TYPE: ICD-10-CM

## 2021-05-14 DIAGNOSIS — M54.2 NECK ACHE: ICD-10-CM

## 2021-05-14 DIAGNOSIS — R52 BURNING PAIN: ICD-10-CM

## 2021-05-14 DIAGNOSIS — R20.0 ANESTHESIA: ICD-10-CM

## 2021-05-14 DIAGNOSIS — R20.2 PARESTHESIA: Primary | ICD-10-CM

## 2021-05-14 PROCEDURE — 72070 X-RAY EXAM THORAC SPINE 2VWS: CPT

## 2021-05-14 PROCEDURE — 72050 X-RAY EXAM NECK SPINE 4/5VWS: CPT

## 2021-05-27 DIAGNOSIS — I10 ESSENTIAL HYPERTENSION: ICD-10-CM

## 2021-05-27 RX ORDER — LOSARTAN POTASSIUM 25 MG/1
TABLET ORAL
Qty: 90 TABLET | Refills: 3 | Status: SHIPPED | OUTPATIENT
Start: 2021-05-27 | End: 2022-05-17

## 2021-12-11 ENCOUNTER — TRANSCRIBE ORDERS (OUTPATIENT)
Dept: LAB | Facility: HOSPITAL | Age: 56
End: 2021-12-11

## 2021-12-11 ENCOUNTER — LAB (OUTPATIENT)
Dept: LAB | Facility: HOSPITAL | Age: 56
End: 2021-12-11

## 2021-12-11 DIAGNOSIS — E11.610 DIABETIC NEUROGENIC ARTHROPATHY (HCC): Primary | ICD-10-CM

## 2021-12-11 DIAGNOSIS — E11.610 DIABETIC NEUROGENIC ARTHROPATHY (HCC): ICD-10-CM

## 2021-12-11 LAB
ANION GAP SERPL CALCULATED.3IONS-SCNC: 12.2 MMOL/L (ref 5–15)
BUN SERPL-MCNC: 19 MG/DL (ref 6–20)
BUN/CREAT SERPL: 23.8 (ref 7–25)
CALCIUM SPEC-SCNC: 9.4 MG/DL (ref 8.6–10.5)
CHLORIDE SERPL-SCNC: 98 MMOL/L (ref 98–107)
CO2 SERPL-SCNC: 25.8 MMOL/L (ref 22–29)
CREAT SERPL-MCNC: 0.8 MG/DL (ref 0.57–1)
GFR SERPL CREATININE-BSD FRML MDRD: 75 ML/MIN/1.73
GLUCOSE SERPL-MCNC: 254 MG/DL (ref 65–99)
HBA1C MFR BLD: 8 % (ref 4.8–5.6)
POTASSIUM SERPL-SCNC: 4.4 MMOL/L (ref 3.5–5.2)
SODIUM SERPL-SCNC: 136 MMOL/L (ref 136–145)

## 2021-12-11 PROCEDURE — 36415 COLL VENOUS BLD VENIPUNCTURE: CPT

## 2021-12-11 PROCEDURE — 80048 BASIC METABOLIC PNL TOTAL CA: CPT

## 2021-12-11 PROCEDURE — 83036 HEMOGLOBIN GLYCOSYLATED A1C: CPT

## 2022-03-22 ENCOUNTER — APPOINTMENT (OUTPATIENT)
Dept: CT IMAGING | Facility: HOSPITAL | Age: 57
End: 2022-03-22

## 2022-03-22 ENCOUNTER — HOSPITAL ENCOUNTER (EMERGENCY)
Facility: HOSPITAL | Age: 57
Discharge: HOME | End: 2022-03-22
Attending: STUDENT IN AN ORGANIZED HEALTH CARE EDUCATION/TRAINING PROGRAM | Admitting: EMERGENCY MEDICINE

## 2022-03-22 ENCOUNTER — APPOINTMENT (OUTPATIENT)
Dept: GENERAL RADIOLOGY | Facility: HOSPITAL | Age: 57
End: 2022-03-22

## 2022-03-22 VITALS
DIASTOLIC BLOOD PRESSURE: 90 MMHG | OXYGEN SATURATION: 96 % | RESPIRATION RATE: 20 BRPM | BODY MASS INDEX: 51.53 KG/M2 | HEIGHT: 62 IN | TEMPERATURE: 98.1 F | HEART RATE: 95 BPM | WEIGHT: 280 LBS | SYSTOLIC BLOOD PRESSURE: 134 MMHG

## 2022-03-22 DIAGNOSIS — D64.9 SYMPTOMATIC ANEMIA: ICD-10-CM

## 2022-03-22 DIAGNOSIS — D50.9 IRON DEFICIENCY ANEMIA, UNSPECIFIED IRON DEFICIENCY ANEMIA TYPE: Primary | ICD-10-CM

## 2022-03-22 LAB
ABO GROUP BLD: NORMAL
ALBUMIN SERPL-MCNC: 3.88 G/DL (ref 3.5–5.2)
ALBUMIN/GLOB SERPL: 1.3 G/DL
ALP SERPL-CCNC: 96 U/L (ref 39–117)
ALT SERPL W P-5'-P-CCNC: 17 U/L (ref 1–33)
ANION GAP SERPL CALCULATED.3IONS-SCNC: 9.7 MMOL/L (ref 5–15)
APTT PPP: 34.1 SECONDS (ref 25.5–35.4)
AST SERPL-CCNC: 20 U/L (ref 1–32)
BASOPHILS # BLD AUTO: 0.09 10*3/MM3 (ref 0–0.2)
BASOPHILS NFR BLD AUTO: 1.2 % (ref 0–1.5)
BILIRUB SERPL-MCNC: 0.2 MG/DL (ref 0–1.2)
BILIRUB UR QL STRIP: NEGATIVE
BLD GP AB SCN SERPL QL: NEGATIVE
BUN SERPL-MCNC: 10 MG/DL (ref 6–20)
BUN/CREAT SERPL: 13 (ref 7–25)
CALCIUM SPEC-SCNC: 8.5 MG/DL (ref 8.6–10.5)
CHLORIDE SERPL-SCNC: 95 MMOL/L (ref 98–107)
CLARITY UR: CLEAR
CO2 SERPL-SCNC: 27.3 MMOL/L (ref 22–29)
COLOR UR: YELLOW
CREAT SERPL-MCNC: 0.77 MG/DL (ref 0.57–1)
DEPRECATED RDW RBC AUTO: 43.5 FL (ref 37–54)
EGFRCR SERPLBLD CKD-EPI 2021: 91.8 ML/MIN/1.73
EOSINOPHIL # BLD AUTO: 0.2 10*3/MM3 (ref 0–0.4)
EOSINOPHIL NFR BLD AUTO: 2.7 % (ref 0.3–6.2)
ERYTHROCYTE [DISTWIDTH] IN BLOOD BY AUTOMATED COUNT: 17 % (ref 12.3–15.4)
FERRITIN SERPL-MCNC: 8.69 NG/ML (ref 13–150)
FLUAV SUBTYP SPEC NAA+PROBE: NOT DETECTED
FLUBV RNA ISLT QL NAA+PROBE: NOT DETECTED
GLOBULIN UR ELPH-MCNC: 3 GM/DL
GLUCOSE SERPL-MCNC: 249 MG/DL (ref 65–99)
GLUCOSE UR STRIP-MCNC: ABNORMAL MG/DL
HCT VFR BLD AUTO: 26.6 % (ref 34–46.6)
HGB BLD-MCNC: 7.2 G/DL (ref 12–15.9)
HGB UR QL STRIP.AUTO: NEGATIVE
HOLD SPECIMEN: NORMAL
HOLD SPECIMEN: NORMAL
HYPOCHROMIA BLD QL: NORMAL
IMM GRANULOCYTES # BLD AUTO: 0.14 10*3/MM3 (ref 0–0.05)
IMM GRANULOCYTES NFR BLD AUTO: 1.9 % (ref 0–0.5)
INR PPP: 1 (ref 0.9–1.1)
IRON 24H UR-MRATE: 17 MCG/DL (ref 37–145)
IRON SATN MFR SERPL: 3 % (ref 20–50)
KETONES UR QL STRIP: NEGATIVE
LEUKOCYTE ESTERASE UR QL STRIP.AUTO: NEGATIVE
LIPASE SERPL-CCNC: 22 U/L (ref 13–60)
LYMPHOCYTES # BLD AUTO: 1.2 10*3/MM3 (ref 0.7–3.1)
LYMPHOCYTES NFR BLD AUTO: 15.9 % (ref 19.6–45.3)
MAGNESIUM SERPL-MCNC: 2 MG/DL (ref 1.6–2.6)
MCH RBC QN AUTO: 19.5 PG (ref 26.6–33)
MCHC RBC AUTO-ENTMCNC: 27.1 G/DL (ref 31.5–35.7)
MCV RBC AUTO: 71.9 FL (ref 79–97)
MICROCYTES BLD QL: NORMAL
MONOCYTES # BLD AUTO: 0.47 10*3/MM3 (ref 0.1–0.9)
MONOCYTES NFR BLD AUTO: 6.2 % (ref 5–12)
NEUTROPHILS NFR BLD AUTO: 5.43 10*3/MM3 (ref 1.7–7)
NEUTROPHILS NFR BLD AUTO: 72.1 % (ref 42.7–76)
NITRITE UR QL STRIP: NEGATIVE
NRBC BLD AUTO-RTO: 0.3 /100 WBC (ref 0–0.2)
PH UR STRIP.AUTO: 7 [PH] (ref 5–8)
PLAT MORPH BLD: NORMAL
PLATELET # BLD AUTO: 260 10*3/MM3 (ref 140–450)
PMV BLD AUTO: 9.3 FL (ref 6–12)
POTASSIUM SERPL-SCNC: 3.9 MMOL/L (ref 3.5–5.2)
PROT SERPL-MCNC: 6.9 G/DL (ref 6–8.5)
PROT UR QL STRIP: NEGATIVE
PROTHROMBIN TIME: 13.6 SECONDS (ref 12.8–14.5)
QT INTERVAL: 398 MS
QTC INTERVAL: 476 MS
RBC # BLD AUTO: 3.7 10*6/MM3 (ref 3.77–5.28)
RETICS # AUTO: 0.12 10*6/MM3 (ref 0.02–0.13)
RETICS/RBC NFR AUTO: 3.29 % (ref 0.7–1.9)
RH BLD: NEGATIVE
SARS-COV-2 RNA PNL SPEC NAA+PROBE: NOT DETECTED
SODIUM SERPL-SCNC: 132 MMOL/L (ref 136–145)
SP GR UR STRIP: 1.02 (ref 1–1.03)
T&S EXPIRATION DATE: NORMAL
T4 FREE SERPL-MCNC: 1.16 NG/DL (ref 0.93–1.7)
TIBC SERPL-MCNC: 551 MCG/DL (ref 298–536)
TRANSFERRIN SERPL-MCNC: 370 MG/DL (ref 200–360)
TROPONIN T SERPL-MCNC: <0.01 NG/ML (ref 0–0.03)
TROPONIN T SERPL-MCNC: <0.01 NG/ML (ref 0–0.03)
TSH SERPL DL<=0.05 MIU/L-ACNC: 3.48 UIU/ML (ref 0.27–4.2)
UROBILINOGEN UR QL STRIP: ABNORMAL
WBC NRBC COR # BLD: 7.53 10*3/MM3 (ref 3.4–10.8)
WHOLE BLOOD HOLD SPECIMEN: NORMAL
WHOLE BLOOD HOLD SPECIMEN: NORMAL

## 2022-03-22 PROCEDURE — 36430 TRANSFUSION BLD/BLD COMPNT: CPT

## 2022-03-22 PROCEDURE — 25010000002 IOPAMIDOL 61 % SOLUTION: Performed by: STUDENT IN AN ORGANIZED HEALTH CARE EDUCATION/TRAINING PROGRAM

## 2022-03-22 PROCEDURE — 86900 BLOOD TYPING SEROLOGIC ABO: CPT

## 2022-03-22 PROCEDURE — 84439 ASSAY OF FREE THYROXINE: CPT | Performed by: PHYSICIAN ASSISTANT

## 2022-03-22 PROCEDURE — 83690 ASSAY OF LIPASE: CPT | Performed by: PHYSICIAN ASSISTANT

## 2022-03-22 PROCEDURE — 96374 THER/PROPH/DIAG INJ IV PUSH: CPT

## 2022-03-22 PROCEDURE — 74177 CT ABD & PELVIS W/CONTRAST: CPT

## 2022-03-22 PROCEDURE — 81003 URINALYSIS AUTO W/O SCOPE: CPT | Performed by: PHYSICIAN ASSISTANT

## 2022-03-22 PROCEDURE — 85045 AUTOMATED RETICULOCYTE COUNT: CPT | Performed by: PHYSICIAN ASSISTANT

## 2022-03-22 PROCEDURE — 93005 ELECTROCARDIOGRAM TRACING: CPT | Performed by: PHYSICIAN ASSISTANT

## 2022-03-22 PROCEDURE — 25010000002 ONDANSETRON PER 1 MG: Performed by: PHYSICIAN ASSISTANT

## 2022-03-22 PROCEDURE — 84484 ASSAY OF TROPONIN QUANT: CPT | Performed by: PHYSICIAN ASSISTANT

## 2022-03-22 PROCEDURE — P9016 RBC LEUKOCYTES REDUCED: HCPCS

## 2022-03-22 PROCEDURE — 36415 COLL VENOUS BLD VENIPUNCTURE: CPT

## 2022-03-22 PROCEDURE — 86900 BLOOD TYPING SEROLOGIC ABO: CPT | Performed by: PHYSICIAN ASSISTANT

## 2022-03-22 PROCEDURE — 87636 SARSCOV2 & INF A&B AMP PRB: CPT | Performed by: PHYSICIAN ASSISTANT

## 2022-03-22 PROCEDURE — 82746 ASSAY OF FOLIC ACID SERUM: CPT | Performed by: PHYSICIAN ASSISTANT

## 2022-03-22 PROCEDURE — 71045 X-RAY EXAM CHEST 1 VIEW: CPT

## 2022-03-22 PROCEDURE — 82607 VITAMIN B-12: CPT | Performed by: PHYSICIAN ASSISTANT

## 2022-03-22 PROCEDURE — 86922 COMPATIBILITY TEST ANTIGLOB: CPT

## 2022-03-22 PROCEDURE — 80050 GENERAL HEALTH PANEL: CPT | Performed by: PHYSICIAN ASSISTANT

## 2022-03-22 PROCEDURE — 83540 ASSAY OF IRON: CPT | Performed by: PHYSICIAN ASSISTANT

## 2022-03-22 PROCEDURE — 74177 CT ABD & PELVIS W/CONTRAST: CPT | Performed by: RADIOLOGY

## 2022-03-22 PROCEDURE — 85730 THROMBOPLASTIN TIME PARTIAL: CPT | Performed by: PHYSICIAN ASSISTANT

## 2022-03-22 PROCEDURE — 85610 PROTHROMBIN TIME: CPT | Performed by: PHYSICIAN ASSISTANT

## 2022-03-22 PROCEDURE — 86901 BLOOD TYPING SEROLOGIC RH(D): CPT | Performed by: PHYSICIAN ASSISTANT

## 2022-03-22 PROCEDURE — 83735 ASSAY OF MAGNESIUM: CPT | Performed by: PHYSICIAN ASSISTANT

## 2022-03-22 PROCEDURE — 71045 X-RAY EXAM CHEST 1 VIEW: CPT | Performed by: RADIOLOGY

## 2022-03-22 PROCEDURE — 99284 EMERGENCY DEPT VISIT MOD MDM: CPT

## 2022-03-22 PROCEDURE — 82728 ASSAY OF FERRITIN: CPT | Performed by: PHYSICIAN ASSISTANT

## 2022-03-22 PROCEDURE — 86850 RBC ANTIBODY SCREEN: CPT | Performed by: PHYSICIAN ASSISTANT

## 2022-03-22 PROCEDURE — 84466 ASSAY OF TRANSFERRIN: CPT | Performed by: PHYSICIAN ASSISTANT

## 2022-03-22 PROCEDURE — 86920 COMPATIBILITY TEST SPIN: CPT

## 2022-03-22 PROCEDURE — 85007 BL SMEAR W/DIFF WBC COUNT: CPT | Performed by: PHYSICIAN ASSISTANT

## 2022-03-22 RX ORDER — ONDANSETRON 2 MG/ML
4 INJECTION INTRAMUSCULAR; INTRAVENOUS ONCE
Status: COMPLETED | OUTPATIENT
Start: 2022-03-22 | End: 2022-03-22

## 2022-03-22 RX ORDER — BUSPIRONE HYDROCHLORIDE 5 MG/1
5 TABLET ORAL ONCE
Status: COMPLETED | OUTPATIENT
Start: 2022-03-22 | End: 2022-03-22

## 2022-03-22 RX ORDER — SODIUM CHLORIDE 0.9 % (FLUSH) 0.9 %
10 SYRINGE (ML) INJECTION AS NEEDED
Status: DISCONTINUED | OUTPATIENT
Start: 2022-03-22 | End: 2022-03-22 | Stop reason: HOSPADM

## 2022-03-22 RX ORDER — ACETAMINOPHEN 500 MG
1000 TABLET ORAL ONCE
Status: COMPLETED | OUTPATIENT
Start: 2022-03-22 | End: 2022-03-22

## 2022-03-22 RX ADMIN — ONDANSETRON 4 MG: 2 INJECTION INTRAMUSCULAR; INTRAVENOUS at 19:31

## 2022-03-22 RX ADMIN — IOPAMIDOL 88 ML: 612 INJECTION, SOLUTION INTRAVENOUS at 15:37

## 2022-03-22 RX ADMIN — BUSPIRONE HYDROCHLORIDE 5 MG: 5 TABLET ORAL at 19:29

## 2022-03-22 RX ADMIN — ACETAMINOPHEN 1000 MG: 500 TABLET ORAL at 16:27

## 2022-03-22 NOTE — DISCHARGE INSTRUCTIONS
Follow-up with Dr. Mendez in the office at the next available appointment to discuss being scheduled for a colonoscopy and upper GI for further evaluation of your anemia.  Follow-up with your primary care provider in the office at the next available appointment as well.  Hold your Xarelto for 2 days.  Please return to the ED if any of your symptoms change or worsen.

## 2022-03-22 NOTE — ED PROVIDER NOTES
"Subjective   56-year-old female presents to the ED today for abnormal labs.  She states she has felt very weak and fatigued for the last 3 or 4 months.  She states she gets short of breath with exertion.  She states she feels like she has \"jelly legs.\"  She had lab work done at her primary care provider's office about 4 days ago.  She states she was called today and was told that her hemoglobin level was critical and she needed to come to the ER.  She states that she had foot surgery at the end of October and had to be in a wheelchair.  She was supposed to be able to come out of the wheelchair about 3 or 4 months ago but has had difficulty with this due to her fatigue and generalized weakness.  She states she feels like she has no stamina.  She denies any blood in her stool or black tarry stool.  She states she does have a history of hemorrhoids that will bleed but denies any hemorrhoids recently.  She states for the last few months she states there will be blood on the toilet paper when she wipes her rectum.  She states this is not every day and is not able to quantify exactly how often this happens.  She states she has a history of irritable bowel syndrome and fluctuates between diarrhea and constipation.  She denies any abdominal pain.  She states she has felt like her abdomen was distended over the last couple weeks.  She states she is never required a blood transfusion in the past.  She states she has been told in the past that she has had anemia but has never had a work-up for this.  She states this was many years ago.  Patient is on Xarelto due to a history of atrial fibrillation.      History provided by:  Patient  Fatigue  Severity:  Moderate  Onset quality:  Gradual  Duration:  4 months  Timing:  Constant  Progression:  Unchanged  Chronicity:  New  Associated symptoms: diarrhea, fatigue and shortness of breath    Associated symptoms: no abdominal pain, no chest pain, no fever, no headaches, no myalgias, no " nausea, no vomiting and no wheezing        Review of Systems   Constitutional: Positive for fatigue. Negative for appetite change and fever.   HENT: Negative.    Eyes: Negative.    Respiratory: Positive for shortness of breath. Negative for chest tightness and wheezing.    Cardiovascular: Negative for chest pain and palpitations.   Gastrointestinal: Positive for abdominal distention, anal bleeding, constipation and diarrhea. Negative for abdominal pain, blood in stool, nausea, rectal pain and vomiting.   Genitourinary: Negative.  Negative for hematuria.   Musculoskeletal: Negative for myalgias.   Skin: Negative.    Neurological: Positive for weakness. Negative for headaches.   Psychiatric/Behavioral: Negative.    All other systems reviewed and are negative.      Past Medical History:   Diagnosis Date   • Acid reflux    • Allergic    • Anxiety    • Asthma    • Atrial fibrillation ([facility])    • Diastolic dysfunction     Grade I   • Diverticula of colon    • Endometriosis    • Gout    • Hyperlipidemia    • Hypertension    • Hyperthyroidism    • Morbid obesity ([facility])    • Panic attack    • RA (rheumatoid arthritis) ([facility])    • Sleep apnea     C-PAP SETTING OF 17, ONLY USES WHEN NOT SLEEPING IN RECLINER    • Spinal headache    • Vitamin D deficiency    • Wears glasses        No Known Allergies    Past Surgical History:   Procedure Laterality Date   •  SECTION      x2   • COLONOSCOPY     • FOOT SURGERY Left     ASHLEY NEUROMA, BONE SPUR AND ACHILLES REPAIR AND PLANTAR FASCITITS    • GASTRIC SLEEVE LAPAROSCOPIC      2 years ago   • HARDWARE REMOVAL FOOT / ANKLE Left    • HYSTERECTOMY      BSO    • JOINT REPLACEMENT     • KNEE SURGERY Left     2 (1 was partial knee replacement)   • NOSE SURGERY      DEVIATED SEPTUM AND SWOLLEN TURBINATES   • SKIN BIOPSY     • THYROIDECTOMY N/A 2017    Procedure: TOTAL THYROIDECTOMY;  Surgeon: Lencho Kay MD;  Location: [facility];  Service:    • TOOTH EXTRACTION     • WISDOM  TOOTH EXTRACTION         Family History   Problem Relation Age of Onset   • Heart attack Mother    • Arrhythmia Father    • Arrhythmia Sister    • Heart attack Maternal Grandmother    • Heart disease Maternal Grandmother    • Heart attack Maternal Grandfather        Social History     Socioeconomic History   • Marital status:    Tobacco Use   • Smoking status: Current Some Day Smoker   • Smokeless tobacco: Never Used   Substance and Sexual Activity   • Alcohol use: No     Comment: !x yearly   • Drug use: No   • Sexual activity: Defer     Comment: Hysterectomy           Objective   Physical Exam  Vitals and nursing note reviewed. Exam conducted with a chaperone present.   Constitutional:       General: She is not in acute distress.     Appearance: Normal appearance. She is obese.   HENT:      Head: Normocephalic and atraumatic.      Right Ear: External ear normal.      Left Ear: External ear normal.      Nose: Nose normal.      Mouth/Throat:      Mouth: Mucous membranes are moist.      Pharynx: Oropharynx is clear.   Eyes:      Extraocular Movements: Extraocular movements intact.      Conjunctiva/sclera: Conjunctivae normal.      Pupils: Pupils are equal, round, and reactive to light.   Cardiovascular:      Rate and Rhythm: Normal rate and regular rhythm.      Pulses: Normal pulses.      Heart sounds: Normal heart sounds.   Pulmonary:      Effort: Pulmonary effort is normal.      Breath sounds: Normal breath sounds.      Comments: Patient did become acutely short of breath with ambulation to the bathroom  Abdominal:      General: Bowel sounds are normal.      Palpations: Abdomen is soft.      Tenderness: There is no abdominal tenderness. There is no guarding or rebound.   Genitourinary:     Rectum: No mass, tenderness, anal fissure or external hemorrhoid. Normal anal tone.      Comments: Assisted by Carla Denis RN  Stool is brown in color, no bright red blood or dark stool noted. Unable to perform a  hemoccult at this facility.  Musculoskeletal:         General: Normal range of motion.      Cervical back: Normal range of motion and neck supple.   Skin:     General: Skin is warm and dry.      Capillary Refill: Capillary refill takes less than 2 seconds.      Coloration: Skin is pale.   Neurological:      General: No focal deficit present.      Mental Status: She is alert and oriented to person, place, and time.   Psychiatric:         Mood and Affect: Mood normal.         Procedures           ED Course  ED Course as of 03/22/22 2015   Tue Mar 22, 2022   1420 EKG noted sinus rhythm.  86 bpm.  .  QRS 82.  QTc 476.  No acute ST elevation. []   1440 XR Chest 1 View  FINDINGS:    Lungs:  Unremarkable.  No consolidation.    Pleural space:  Unremarkable.  No pneumothorax.    Heart:  Unremarkable.  No cardiomegaly.    Mediastinum:  Unremarkable.    Bones/joints:  Bony structures are stable.     IMPRESSION:    Stable chest. No acute cardiopulmonary findings. []   1501 Case discussed with Dr. Hall []   1600 CT Abdomen Pelvis With Contrast    IMPRESSION:  1.  No acute findings within abdomen or pelvis.  2.  Diverticulosis without diverticulitis.  3.  Marked diffuse fatty infiltration of liver.  4.  No free fluid or hemoperitoneum. No pneumoperitoneum.  5.  Changes of prior sleeve gastrectomy.  6.  Stable cysts both kidneys including a probable hyperdense cyst left  kidney.  7.  Other nonacute/incidental findings detailed above. []   1606 I discussed this patient with Dr. Hall.  Due to the patient being symptomatic with her anemia, a unit of blood has been ordered.  After that, the patient can be discharged home to follow-up outpatient for further work-up of her anemia. []   2014 Endorsed to Dr. Burton []      ED Course User Index  [] Jeanne Winter, PA  [SF] Demetri Hall DO                                                 MDM  Number of Diagnoses or Management Options     Amount and/or Complexity of  Data Reviewed  Clinical lab tests: reviewed  Tests in the radiology section of CPT®: reviewed  Tests in the medicine section of CPT®: reviewed  Decide to obtain previous medical records or to obtain history from someone other than the patient: yes  Discuss the patient with other providers: yes    Patient Progress  Patient progress: stable      Final diagnoses:   Iron deficiency anemia, unspecified iron deficiency anemia type   Symptomatic anemia     Electronically signed by LEVI Hatfield, 03/22/22, 8:15 PM EDT.  ED Disposition  ED Disposition     ED Disposition   Discharge    Condition   Stable    Comment   --             Lencho Mendez MD  [address] [facility] [address]  [phone]    Schedule an appointment as soon as possible for a visit in 2 days      Jazzmine Zapata PA-C  [address]  [address]  [facility] [id number] [phone]    Schedule an appointment as soon as possible for a visit in 2 days           Medication List      No changes were made to your prescriptions during this visit.          Dharmesh Burton MD  03/22/22 0255

## 2022-03-23 LAB
BH BB BLOOD EXPIRATION DATE: NORMAL
BH BB BLOOD TYPE BARCODE: 600
BH BB DISPENSE STATUS: NORMAL
BH BB PRODUCT CODE: NORMAL
BH BB UNIT NUMBER: NORMAL
CROSSMATCH INTERPRETATION: NORMAL
FOLATE SERPL-MCNC: 16.4 NG/ML (ref 4.78–24.2)
UNIT  ABO: NORMAL
UNIT  RH: NORMAL
VIT B12 BLD-MCNC: 545 PG/ML (ref 211–946)

## 2022-05-11 DIAGNOSIS — I10 ESSENTIAL HYPERTENSION: ICD-10-CM

## 2022-05-11 DIAGNOSIS — R60.0 LOWER EXTREMITY EDEMA: ICD-10-CM

## 2022-05-11 RX ORDER — SPIRONOLACTONE 25 MG/1
25 TABLET ORAL DAILY
Qty: 90 TABLET | Refills: 0 | Status: SHIPPED | OUTPATIENT
Start: 2022-05-11 | End: 2022-08-26 | Stop reason: SDUPTHER

## 2022-05-11 RX ORDER — SPIRONOLACTONE 25 MG/1
25 TABLET ORAL DAILY
Qty: 30 TABLET | Refills: 0 | Status: SHIPPED | OUTPATIENT
Start: 2022-05-11 | End: 2022-05-11

## 2022-05-17 ENCOUNTER — OFFICE VISIT (OUTPATIENT)
Dept: CARDIOLOGY | Facility: CLINIC | Age: 57
End: 2022-05-17

## 2022-05-17 VITALS
TEMPERATURE: 96 F | DIASTOLIC BLOOD PRESSURE: 96 MMHG | HEIGHT: 62 IN | BODY MASS INDEX: 52.26 KG/M2 | OXYGEN SATURATION: 100 % | HEART RATE: 91 BPM | WEIGHT: 284 LBS | SYSTOLIC BLOOD PRESSURE: 184 MMHG

## 2022-05-17 DIAGNOSIS — R00.2 PALPITATIONS: ICD-10-CM

## 2022-05-17 DIAGNOSIS — I48.0 PAROXYSMAL ATRIAL FIBRILLATION: ICD-10-CM

## 2022-05-17 DIAGNOSIS — K27.9 PUD (PEPTIC ULCER DISEASE): ICD-10-CM

## 2022-05-17 DIAGNOSIS — I10 ESSENTIAL HYPERTENSION: Primary | ICD-10-CM

## 2022-05-17 DIAGNOSIS — D50.8 OTHER IRON DEFICIENCY ANEMIA: ICD-10-CM

## 2022-05-17 PROCEDURE — 99214 OFFICE O/P EST MOD 30 MIN: CPT | Performed by: NURSE PRACTITIONER

## 2022-05-17 RX ORDER — ROPINIROLE 0.5 MG/1
0.5 TABLET, FILM COATED ORAL NIGHTLY
COMMUNITY

## 2022-05-17 RX ORDER — DAPAGLIFLOZIN 5 MG/1
5 TABLET, FILM COATED ORAL DAILY
COMMUNITY

## 2022-05-17 RX ORDER — METOPROLOL TARTRATE 100 MG/1
100 TABLET ORAL 2 TIMES DAILY
Qty: 60 TABLET | Refills: 11 | Status: SHIPPED | OUTPATIENT
Start: 2022-05-17

## 2022-05-17 RX ORDER — PANTOPRAZOLE SODIUM 40 MG/1
40 TABLET, DELAYED RELEASE ORAL 2 TIMES DAILY
COMMUNITY

## 2022-05-17 RX ORDER — GABAPENTIN 300 MG/1
300 CAPSULE ORAL NIGHTLY
COMMUNITY

## 2022-05-17 RX ORDER — FERROUS SULFATE 325(65) MG
325 TABLET ORAL
COMMUNITY
End: 2022-06-01 | Stop reason: SDUPTHER

## 2022-05-17 RX ORDER — LOSARTAN POTASSIUM 25 MG/1
25 TABLET ORAL DAILY
Qty: 90 TABLET | Refills: 3
Start: 2022-05-17 | End: 2022-06-17

## 2022-05-17 RX ORDER — BUSPIRONE HYDROCHLORIDE 5 MG/1
5 TABLET ORAL 3 TIMES DAILY
COMMUNITY
End: 2022-08-26 | Stop reason: ALTCHOICE

## 2022-05-17 NOTE — PROGRESS NOTES
Juliana Smith APRN  Ondina Hayes  1965 05/17/2022    Patient Active Problem List   Diagnosis   • Rapid atrial fibrillation (HCC)   • Paroxysmal atrial fibrillation (HCC)   • Hypertension   • Renal cyst       Dear Juliana Smith APRN:    Subjective     Chief Complaint   Patient presents with   • Med Management     List.      • Shortness of Breath   • Edema   • Palpitations   • Chest Pain           History of Present Illness:    Ondina Hayes is a 56 y.o. female with a past medical history of paroxysmal atrial fibrillation and hypertension. She presents today for routine cardiology follow up.    She was last in our office about 1 year ago.  Since that time she reports she had foot surgery and was noted to be anemic with a hemoglobin of 6.  She states she did receive PRBC transfusion.  She underwent endoscopies with reported discovery of peptic ulcer disease but no active bleeding.  She was then told her anemia was iron deficiency.  She has now been on an iron supplement and reports her last hemoglobin was around 9.  She did have labs about 3 weeks ago and is due to repeat those labs per her PCP who is monitoring.  She denies any overt bleeding such as hematuria or blood in her stools.  She has continued on Xarelto throughout this time.  Her blood pressure has been fluctuating which she attributes to pain in her ankle.  Most of the time it is running high.  She reports some palpitations which she feels are A. fib episodes.  They seem to be getting better as the anemia has improved.        No Known Allergies:      Current Outpatient Medications:   •  acetaminophen-codeine (TYLENOL #3) 300-30 MG per tablet, Take 1 tablet by mouth Every 6 (Six) Hours As Needed for Moderate Pain ., Disp: , Rfl:   •  albuterol (PROVENTIL HFA;VENTOLIN HFA) 108 (90 BASE) MCG/ACT inhaler, Inhale 2 puffs As Needed for Wheezing., Disp: , Rfl:   •  allopurinol (ZYLOPRIM) 100 MG tablet, Take 100 mg by mouth Daily., Disp: ,  Rfl:   •  amitriptyline (ELAVIL) 100 MG tablet, Take 100 mg by mouth Every Evening., Disp: , Rfl:   •  Ascorbic Acid (VITAMIN C ER PO), Take  by mouth., Disp: , Rfl:   •  baclofen (LIORESAL) 10 MG tablet, Take 10 mg by mouth every night at bedtime. 10mg at night and 5 mg in the AM, Disp: , Rfl:   •  busPIRone (BUSPAR) 5 MG tablet, Take 5 mg by mouth 3 (Three) Times a Day., Disp: , Rfl:   •  dapagliflozin (Farxiga) 5 MG tablet tablet, Take 5 mg by mouth Daily., Disp: , Rfl:   •  Ergocalciferol (VITAMIN D2 PO), Take  by mouth., Disp: , Rfl:   •  ferrous sulfate 325 (65 FE) MG tablet, Take 325 mg by mouth Daily With Breakfast., Disp: , Rfl:   •  gabapentin (NEURONTIN) 300 MG capsule, Take 300 mg by mouth 3 (Three) Times a Day., Disp: , Rfl:   •  levothyroxine (SYNTHROID, LEVOTHROID) 150 MCG tablet, Take 150 mcg by mouth Daily., Disp: , Rfl:   •  losartan (COZAAR) 25 MG tablet, Take 1 tablet by mouth Daily., Disp: 90 tablet, Rfl: 3  •  metFORMIN (GLUCOPHAGE) 500 MG tablet, Take 500 mg by mouth 2 (Two) Times a Day With Meals., Disp: , Rfl:   •  metoprolol tartrate (LOPRESSOR) 100 MG tablet, Take 1 tablet by mouth 2 (Two) Times a Day. Take with metoprolol tartrate 25 mg tablet for a total of 125 mg twice a day., Disp: 60 tablet, Rfl: 11  •  montelukast (SINGULAIR) 10 MG tablet, Take 10 mg by mouth Every Morning., Disp: , Rfl:   •  pantoprazole (PROTONIX) 40 MG EC tablet, Take 40 mg by mouth 2 (Two) Times a Day., Disp: , Rfl:   •  pravastatin (PRAVACHOL) 10 MG tablet, Take 10 mg by mouth Every Night., Disp: , Rfl:   •  rOPINIRole (REQUIP) 0.5 MG tablet, Take 0.5 mg by mouth Every Night. Take 1 hour before bedtime., Disp: , Rfl:   •  spironolactone (ALDACTONE) 25 MG tablet, TAKE 1 TABLET BY MOUTH DAILY, Disp: 90 tablet, Rfl: 0  •  Xarelto 20 MG tablet, TAKE ONE TABLET BY MOUTH DAILY WITH DINNER, Disp: 30 tablet, Rfl: 5  •  metoprolol tartrate (LOPRESSOR) 25 MG tablet, Take 1 tablet by mouth 2 (Two) Times a Day. Take with  "metoprolol tartrate 100 mg for a total of metoprolol 125 mg twice a day., Disp: 60 tablet, Rfl: 11      The following portions of the patient's history were reviewed and updated as appropriate: allergies, current medications, past family history, past medical history, past social history, past surgical history and problem list.    Social History     Tobacco Use   • Smoking status: Current Some Day Smoker   • Smokeless tobacco: Never Used   Substance Use Topics   • Alcohol use: No     Comment: !x yearly   • Drug use: No       ROS    Objective   Vitals:    05/17/22 0844   BP: (!) 184/96   BP Location: Left arm   Patient Position: Sitting   Cuff Size: Adult   Pulse: 91   Temp: 96 °F (35.6 °C)   TempSrc: Infrared   SpO2: 100%   Weight: 129 kg (284 lb)   Height: 157.5 cm (62\")     Body mass index is 51.94 kg/m².        Vitals reviewed.   Constitutional:       Appearance: Healthy appearance. Well-developed and not in distress.   HENT:      Head: Normocephalic and atraumatic.   Neck:      Vascular: No JVD.   Pulmonary:      Effort: Pulmonary effort is normal.      Breath sounds: Normal breath sounds. No wheezing. No rales.   Cardiovascular:      Normal rate. Regular rhythm.      Murmurs: There is no murmur.      . No S3 and S4 gallop.   Edema:     Peripheral edema absent.   Abdominal:      General: Bowel sounds are normal.      Palpations: Abdomen is soft.   Musculoskeletal:      Comments: Ambulating with cane Skin:     General: Skin is warm and dry.   Neurological:      Mental Status: Alert, oriented to person, place, and time and oriented to person, place and time.   Psychiatric:         Mood and Affect: Mood normal.         Behavior: Behavior normal.         Lab Results   Component Value Date     (L) 03/22/2022    K 3.9 03/22/2022    CL 95 (L) 03/22/2022    CO2 27.3 03/22/2022    BUN 10 03/22/2022    CREATININE 0.77 03/22/2022    GLUCOSE 249 (H) 03/22/2022    CALCIUM 8.5 (L) 03/22/2022    AST 20 03/22/2022    ALT " 17 03/22/2022    ALKPHOS 96 03/22/2022    LABIL2 1.2 (L) 03/10/2015     Lab Results   Component Value Date    CKTOTAL 27 07/18/2017     Lab Results   Component Value Date    WBC 7.53 03/22/2022    HGB 7.2 (L) 03/22/2022    HCT 26.6 (L) 03/22/2022     03/22/2022     Lab Results   Component Value Date    INR 1.00 03/22/2022    INR 0.89 (L) 06/03/2020    INR 1.13 (H) 08/07/2017     Lab Results   Component Value Date    MG 2.0 03/22/2022     Lab Results   Component Value Date    TSH 3.480 03/22/2022    CHLPL 164 03/10/2015    TRIG 82 03/10/2015    HDL 49 (L) 03/10/2015    LDL 99 03/10/2015      No results found for: BNP        Procedures      Assessment & Plan    Diagnosis Plan   1. Essential hypertension  losartan (COZAAR) 25 MG tablet    metoprolol tartrate (LOPRESSOR) 25 MG tablet    metoprolol tartrate (LOPRESSOR) 100 MG tablet   2. Paroxysmal atrial fibrillation (HCC)  Cardiac Event Monitor    Adult Transthoracic Echo Complete W/ Cont if Necessary Per Protocol   3. PUD (peptic ulcer disease)     4. Other iron deficiency anemia     5. Palpitations  Cardiac Event Monitor    Adult Transthoracic Echo Complete W/ Cont if Necessary Per Protocol                Recommendations:    1. Essential hypertension - BP not at goal. Will increase metoprolol tartrate to 125 mg BID and monitor for improvement.  2. Paroxysmal atrial fibrillation - Will order 30 day event monitor to evaluate further. Continue Xarelto for now. Will request most recent labs to monitor hemoglobin. If patient's anemia is worsening, will have to possibly consider referral for Watchman's device. She voices understanding.  3. Follow up in 6 weeks or sooner if needed.         Return in about 6 weeks (around 6/28/2022) for Recheck.    As always, I appreciate very much the opportunity to participate in the cardiovascular care of your patients.      With Best Regards,    NHAN Anderson

## 2022-05-19 ENCOUNTER — TELEPHONE (OUTPATIENT)
Dept: CARDIOLOGY | Facility: CLINIC | Age: 57
End: 2022-05-19

## 2022-05-19 DIAGNOSIS — I48.0 PAROXYSMAL ATRIAL FIBRILLATION: ICD-10-CM

## 2022-05-19 NOTE — TELEPHONE ENCOUNTER
Patient called and said that she was here for an appointment and her refills for her spironolactone and xarelto was not called in. Patient needs them called in and is requesting a 90 day supply.     Thanks!

## 2022-06-01 ENCOUNTER — CONSULT (OUTPATIENT)
Dept: ONCOLOGY | Facility: CLINIC | Age: 57
End: 2022-06-01

## 2022-06-01 VITALS
BODY MASS INDEX: 50.68 KG/M2 | TEMPERATURE: 97.1 F | OXYGEN SATURATION: 97 % | HEART RATE: 77 BPM | HEIGHT: 62 IN | SYSTOLIC BLOOD PRESSURE: 146 MMHG | DIASTOLIC BLOOD PRESSURE: 93 MMHG | WEIGHT: 275.4 LBS | RESPIRATION RATE: 18 BRPM

## 2022-06-01 DIAGNOSIS — D64.9 ANEMIA, UNSPECIFIED TYPE: Primary | ICD-10-CM

## 2022-06-01 DIAGNOSIS — D50.9 IRON DEFICIENCY ANEMIA, UNSPECIFIED IRON DEFICIENCY ANEMIA TYPE: ICD-10-CM

## 2022-06-01 DIAGNOSIS — D50.9 MICROCYTIC ANEMIA: ICD-10-CM

## 2022-06-01 LAB
ALBUMIN SERPL-MCNC: 4.65 G/DL (ref 3.5–5.2)
ALBUMIN/GLOB SERPL: 1.3 G/DL
ALP SERPL-CCNC: 83 U/L (ref 39–117)
ALT SERPL W P-5'-P-CCNC: 48 U/L (ref 1–33)
ANION GAP SERPL CALCULATED.3IONS-SCNC: 13.5 MMOL/L (ref 5–15)
AST SERPL-CCNC: 63 U/L (ref 1–32)
BASOPHILS # BLD AUTO: 0.08 10*3/MM3 (ref 0–0.2)
BASOPHILS NFR BLD AUTO: 1.1 % (ref 0–1.5)
BILIRUB SERPL-MCNC: 0.3 MG/DL (ref 0–1.2)
BILIRUB UR QL STRIP: NEGATIVE
BUN SERPL-MCNC: 8 MG/DL (ref 6–20)
BUN/CREAT SERPL: 9.5 (ref 7–25)
CALCIUM SPEC-SCNC: 10 MG/DL (ref 8.6–10.5)
CHLORIDE SERPL-SCNC: 93 MMOL/L (ref 98–107)
CHROMATIN AB SERPL-ACNC: <10 IU/ML (ref 0–14)
CLARITY UR: CLEAR
CO2 SERPL-SCNC: 27.5 MMOL/L (ref 22–29)
COLOR UR: YELLOW
CREAT SERPL-MCNC: 0.84 MG/DL (ref 0.57–1)
CRP SERPL-MCNC: 0.93 MG/DL (ref 0–0.5)
DEPRECATED RDW RBC AUTO: 54.1 FL (ref 37–54)
EGFRCR SERPLBLD CKD-EPI 2021: 82.7 ML/MIN/1.73
EOSINOPHIL # BLD AUTO: 0.17 10*3/MM3 (ref 0–0.4)
EOSINOPHIL NFR BLD AUTO: 2.3 % (ref 0.3–6.2)
ERYTHROCYTE [DISTWIDTH] IN BLOOD BY AUTOMATED COUNT: 18.3 % (ref 12.3–15.4)
ERYTHROCYTE [SEDIMENTATION RATE] IN BLOOD: 40 MM/HR (ref 0–30)
FERRITIN SERPL-MCNC: 52.12 NG/ML (ref 13–150)
FOLATE SERPL-MCNC: >20 NG/ML (ref 4.78–24.2)
GLOBULIN UR ELPH-MCNC: 3.7 GM/DL
GLUCOSE SERPL-MCNC: 166 MG/DL (ref 65–99)
GLUCOSE UR STRIP-MCNC: ABNORMAL MG/DL
HAPTOGLOB SERPL-MCNC: 227 MG/DL (ref 30–200)
HCT VFR BLD AUTO: 43.6 % (ref 34–46.6)
HGB BLD-MCNC: 13.5 G/DL (ref 12–15.9)
HGB UR QL STRIP.AUTO: NEGATIVE
IMM GRANULOCYTES # BLD AUTO: 0.03 10*3/MM3 (ref 0–0.05)
IMM GRANULOCYTES NFR BLD AUTO: 0.4 % (ref 0–0.5)
IRON 24H UR-MRATE: 50 MCG/DL (ref 37–145)
IRON SATN MFR SERPL: 9 % (ref 20–50)
KETONES UR QL STRIP: NEGATIVE
LDH SERPL-CCNC: 221 U/L (ref 135–214)
LEUKOCYTE ESTERASE UR QL STRIP.AUTO: NEGATIVE
LYMPHOCYTES # BLD AUTO: 1.34 10*3/MM3 (ref 0.7–3.1)
LYMPHOCYTES NFR BLD AUTO: 18.4 % (ref 19.6–45.3)
MCH RBC QN AUTO: 25.3 PG (ref 26.6–33)
MCHC RBC AUTO-ENTMCNC: 31 G/DL (ref 31.5–35.7)
MCV RBC AUTO: 81.6 FL (ref 79–97)
MONOCYTES # BLD AUTO: 0.57 10*3/MM3 (ref 0.1–0.9)
MONOCYTES NFR BLD AUTO: 7.8 % (ref 5–12)
NEUTROPHILS NFR BLD AUTO: 5.08 10*3/MM3 (ref 1.7–7)
NEUTROPHILS NFR BLD AUTO: 70 % (ref 42.7–76)
NITRITE UR QL STRIP: NEGATIVE
NRBC BLD AUTO-RTO: 0 /100 WBC (ref 0–0.2)
PH UR STRIP.AUTO: 6 [PH] (ref 5–8)
PLATELET # BLD AUTO: 387 10*3/MM3 (ref 140–450)
PMV BLD AUTO: 9.3 FL (ref 6–12)
POTASSIUM SERPL-SCNC: 4.5 MMOL/L (ref 3.5–5.2)
PROT SERPL-MCNC: 8.3 G/DL (ref 6–8.5)
PROT UR QL STRIP: NEGATIVE
RBC # BLD AUTO: 5.34 10*6/MM3 (ref 3.77–5.28)
RETICS # AUTO: 0.1 10*6/MM3 (ref 0.02–0.13)
RETICS/RBC NFR AUTO: 1.83 % (ref 0.7–1.9)
SODIUM SERPL-SCNC: 134 MMOL/L (ref 136–145)
SP GR UR STRIP: 1.02 (ref 1–1.03)
TIBC SERPL-MCNC: 536 MCG/DL (ref 298–536)
TRANSFERRIN SERPL-MCNC: 360 MG/DL (ref 200–360)
TSH SERPL DL<=0.05 MIU/L-ACNC: 0.71 UIU/ML (ref 0.27–4.2)
UROBILINOGEN UR QL STRIP: ABNORMAL
VIT B12 BLD-MCNC: 756 PG/ML (ref 211–946)
WBC NRBC COR # BLD: 7.27 10*3/MM3 (ref 3.4–10.8)

## 2022-06-01 PROCEDURE — 82746 ASSAY OF FOLIC ACID SERUM: CPT | Performed by: NURSE PRACTITIONER

## 2022-06-01 PROCEDURE — 81003 URINALYSIS AUTO W/O SCOPE: CPT | Performed by: NURSE PRACTITIONER

## 2022-06-01 PROCEDURE — 86140 C-REACTIVE PROTEIN: CPT | Performed by: NURSE PRACTITIONER

## 2022-06-01 PROCEDURE — 82728 ASSAY OF FERRITIN: CPT | Performed by: NURSE PRACTITIONER

## 2022-06-01 PROCEDURE — 85060 BLOOD SMEAR INTERPRETATION: CPT | Performed by: NURSE PRACTITIONER

## 2022-06-01 PROCEDURE — 86431 RHEUMATOID FACTOR QUANT: CPT | Performed by: NURSE PRACTITIONER

## 2022-06-01 PROCEDURE — 84466 ASSAY OF TRANSFERRIN: CPT | Performed by: NURSE PRACTITIONER

## 2022-06-01 PROCEDURE — 85652 RBC SED RATE AUTOMATED: CPT | Performed by: NURSE PRACTITIONER

## 2022-06-01 PROCEDURE — 83615 LACTATE (LD) (LDH) ENZYME: CPT | Performed by: NURSE PRACTITIONER

## 2022-06-01 PROCEDURE — 82525 ASSAY OF COPPER: CPT | Performed by: NURSE PRACTITIONER

## 2022-06-01 PROCEDURE — 99215 OFFICE O/P EST HI 40 MIN: CPT | Performed by: NURSE PRACTITIONER

## 2022-06-01 PROCEDURE — 83540 ASSAY OF IRON: CPT | Performed by: NURSE PRACTITIONER

## 2022-06-01 PROCEDURE — 80050 GENERAL HEALTH PANEL: CPT | Performed by: NURSE PRACTITIONER

## 2022-06-01 PROCEDURE — 85045 AUTOMATED RETICULOCYTE COUNT: CPT | Performed by: NURSE PRACTITIONER

## 2022-06-01 PROCEDURE — 82607 VITAMIN B-12: CPT | Performed by: NURSE PRACTITIONER

## 2022-06-01 PROCEDURE — 83010 ASSAY OF HAPTOGLOBIN QUANT: CPT | Performed by: NURSE PRACTITIONER

## 2022-06-01 PROCEDURE — 86038 ANTINUCLEAR ANTIBODIES: CPT | Performed by: NURSE PRACTITIONER

## 2022-06-01 PROCEDURE — 84630 ASSAY OF ZINC: CPT | Performed by: NURSE PRACTITIONER

## 2022-06-01 PROCEDURE — 86364 TISS TRNSGLTMNASE EA IG CLAS: CPT | Performed by: NURSE PRACTITIONER

## 2022-06-01 RX ORDER — AMOXICILLIN 250 MG
2 CAPSULE ORAL 2 TIMES DAILY
Qty: 120 TABLET | Refills: 1 | Status: SHIPPED | OUTPATIENT
Start: 2022-06-01

## 2022-06-01 RX ORDER — FERROUS SULFATE 325(65) MG
325 TABLET ORAL
Qty: 90 TABLET | Refills: 2 | Status: SHIPPED | OUTPATIENT
Start: 2022-06-01

## 2022-06-02 LAB
ANA SER QL: NEGATIVE
TTG IGA SER-ACNC: <2 U/ML (ref 0–3)

## 2022-06-03 LAB
COPPER SERPL-MCNC: 119 UG/DL (ref 80–158)
CYTOLOGIST CVX/VAG CYTO: NORMAL
PATH INTERP BLD-IMP: NORMAL
ZINC SERPL-MCNC: 105 UG/DL (ref 44–115)

## 2022-06-06 ENCOUNTER — PATIENT ROUNDING (BHMG ONLY) (OUTPATIENT)
Dept: ONCOLOGY | Facility: CLINIC | Age: 57
End: 2022-06-06

## 2022-06-14 ENCOUNTER — APPOINTMENT (OUTPATIENT)
Dept: CARDIOLOGY | Facility: HOSPITAL | Age: 57
End: 2022-06-14

## 2022-06-17 DIAGNOSIS — I10 ESSENTIAL HYPERTENSION: ICD-10-CM

## 2022-06-17 RX ORDER — LOSARTAN POTASSIUM 25 MG/1
TABLET ORAL
Qty: 90 TABLET | Refills: 3 | Status: SHIPPED | OUTPATIENT
Start: 2022-06-17 | End: 2022-06-23

## 2022-06-22 DIAGNOSIS — I10 ESSENTIAL HYPERTENSION: ICD-10-CM

## 2022-06-23 RX ORDER — LOSARTAN POTASSIUM 25 MG/1
TABLET ORAL
Qty: 90 TABLET | Refills: 0 | Status: SHIPPED | OUTPATIENT
Start: 2022-06-23

## 2022-07-18 ENCOUNTER — OFFICE VISIT (OUTPATIENT)
Dept: ONCOLOGY | Facility: CLINIC | Age: 57
End: 2022-07-18

## 2022-07-18 ENCOUNTER — LAB (OUTPATIENT)
Dept: ONCOLOGY | Facility: CLINIC | Age: 57
End: 2022-07-18

## 2022-07-18 VITALS
SYSTOLIC BLOOD PRESSURE: 130 MMHG | HEIGHT: 62 IN | RESPIRATION RATE: 18 BRPM | OXYGEN SATURATION: 98 % | TEMPERATURE: 97.1 F | DIASTOLIC BLOOD PRESSURE: 83 MMHG | BODY MASS INDEX: 49.32 KG/M2 | HEART RATE: 77 BPM | WEIGHT: 268 LBS

## 2022-07-18 DIAGNOSIS — D50.9 IRON DEFICIENCY ANEMIA, UNSPECIFIED IRON DEFICIENCY ANEMIA TYPE: ICD-10-CM

## 2022-07-18 DIAGNOSIS — D50.9 MICROCYTIC ANEMIA: ICD-10-CM

## 2022-07-18 DIAGNOSIS — D50.9 IRON DEFICIENCY ANEMIA, UNSPECIFIED IRON DEFICIENCY ANEMIA TYPE: Primary | ICD-10-CM

## 2022-07-18 DIAGNOSIS — D64.9 ANEMIA, UNSPECIFIED TYPE: ICD-10-CM

## 2022-07-18 LAB
ALBUMIN SERPL-MCNC: 4.25 G/DL (ref 3.5–5.2)
ALBUMIN/GLOB SERPL: 1.3 G/DL
ALP SERPL-CCNC: 70 U/L (ref 39–117)
ALT SERPL W P-5'-P-CCNC: 29 U/L (ref 1–33)
ANION GAP SERPL CALCULATED.3IONS-SCNC: 12.2 MMOL/L (ref 5–15)
AST SERPL-CCNC: 30 U/L (ref 1–32)
BASOPHILS # BLD AUTO: 0.06 10*3/MM3 (ref 0–0.2)
BASOPHILS NFR BLD AUTO: 0.8 % (ref 0–1.5)
BILIRUB SERPL-MCNC: 0.3 MG/DL (ref 0–1.2)
BUN SERPL-MCNC: 14 MG/DL (ref 6–20)
BUN/CREAT SERPL: 15.9 (ref 7–25)
CALCIUM SPEC-SCNC: 9.1 MG/DL (ref 8.6–10.5)
CHLORIDE SERPL-SCNC: 103 MMOL/L (ref 98–107)
CO2 SERPL-SCNC: 24.8 MMOL/L (ref 22–29)
CREAT SERPL-MCNC: 0.88 MG/DL (ref 0.57–1)
DEPRECATED RDW RBC AUTO: 42.3 FL (ref 37–54)
EGFRCR SERPLBLD CKD-EPI 2021: 78.2 ML/MIN/1.73
EOSINOPHIL # BLD AUTO: 0.18 10*3/MM3 (ref 0–0.4)
EOSINOPHIL NFR BLD AUTO: 2.4 % (ref 0.3–6.2)
ERYTHROCYTE [DISTWIDTH] IN BLOOD BY AUTOMATED COUNT: 14 % (ref 12.3–15.4)
FERRITIN SERPL-MCNC: 76.53 NG/ML (ref 13–150)
GLOBULIN UR ELPH-MCNC: 3.2 GM/DL
GLUCOSE SERPL-MCNC: 249 MG/DL (ref 65–99)
HCT VFR BLD AUTO: 43.3 % (ref 34–46.6)
HGB BLD-MCNC: 13.7 G/DL (ref 12–15.9)
IMM GRANULOCYTES # BLD AUTO: 0.02 10*3/MM3 (ref 0–0.05)
IMM GRANULOCYTES NFR BLD AUTO: 0.3 % (ref 0–0.5)
IRON 24H UR-MRATE: 61 MCG/DL (ref 37–145)
IRON SATN MFR SERPL: 13 % (ref 20–50)
LYMPHOCYTES # BLD AUTO: 1.59 10*3/MM3 (ref 0.7–3.1)
LYMPHOCYTES NFR BLD AUTO: 21.3 % (ref 19.6–45.3)
MCH RBC QN AUTO: 26.7 PG (ref 26.6–33)
MCHC RBC AUTO-ENTMCNC: 31.6 G/DL (ref 31.5–35.7)
MCV RBC AUTO: 84.2 FL (ref 79–97)
MONOCYTES # BLD AUTO: 0.58 10*3/MM3 (ref 0.1–0.9)
MONOCYTES NFR BLD AUTO: 7.8 % (ref 5–12)
NEUTROPHILS NFR BLD AUTO: 5.04 10*3/MM3 (ref 1.7–7)
NEUTROPHILS NFR BLD AUTO: 67.4 % (ref 42.7–76)
NRBC BLD AUTO-RTO: 0 /100 WBC (ref 0–0.2)
PLATELET # BLD AUTO: 323 10*3/MM3 (ref 140–450)
PMV BLD AUTO: 9.8 FL (ref 6–12)
POTASSIUM SERPL-SCNC: 4.2 MMOL/L (ref 3.5–5.2)
PROT SERPL-MCNC: 7.4 G/DL (ref 6–8.5)
RBC # BLD AUTO: 5.14 10*6/MM3 (ref 3.77–5.28)
SODIUM SERPL-SCNC: 140 MMOL/L (ref 136–145)
TIBC SERPL-MCNC: 462 MCG/DL (ref 298–536)
TRANSFERRIN SERPL-MCNC: 310 MG/DL (ref 200–360)
WBC NRBC COR # BLD: 7.47 10*3/MM3 (ref 3.4–10.8)

## 2022-07-18 PROCEDURE — 99214 OFFICE O/P EST MOD 30 MIN: CPT | Performed by: NURSE PRACTITIONER

## 2022-07-18 PROCEDURE — 83540 ASSAY OF IRON: CPT | Performed by: NURSE PRACTITIONER

## 2022-07-18 PROCEDURE — 84466 ASSAY OF TRANSFERRIN: CPT | Performed by: NURSE PRACTITIONER

## 2022-07-18 PROCEDURE — 80053 COMPREHEN METABOLIC PANEL: CPT | Performed by: NURSE PRACTITIONER

## 2022-07-18 PROCEDURE — 85025 COMPLETE CBC W/AUTO DIFF WBC: CPT | Performed by: NURSE PRACTITIONER

## 2022-07-18 PROCEDURE — 82728 ASSAY OF FERRITIN: CPT | Performed by: NURSE PRACTITIONER

## 2022-07-18 NOTE — PROGRESS NOTES
DATE:  7/18/2022    DIAGNOSIS: GLADYS    CHIEF COMPLAINT:  Fatigue- Improved    TREATMENT HISTORY:  Ferrous Sulfate 325 mg daily    HISTORY OF PRESENT ILLNESS:   Ondina Hayes is a very pleasant 56 y.o. female who is being seen today at the request of NHAN Louise for evaluation and treatment of anemia. Ms. Hayes reports that she recently established care with above PCP. She reports that she has been aware of anemia since at least October 2021. She reports feeling poorly for the last couple of months. She complains of extreme fatigue, weakness and shortness of breath on exertion. She reports intermittent hematuria. Otherwise, denies any obvious blood loss from any source, no melena or rectal bleeding. She reports having EGD/colonoscopy last month per Dr. Mendez which was unremarkable. She required one unit PRBCs for Hg (6.0) in March 2022. She has no other history of blood transfusion. She also reports gastric sleeve approximately 8 years ago. Of note, patient follows with Dr. Benton (rheumatology) for possible autoimmune disorder. Patient reports that she is also on Xarelto for A-Fib. She denies any other specific complaints today. Previous available CBCs were reviewed and noted microcytic anemia, Hg 9.4, in April 2022 and Hg 7.2 in March 2022. She had normal H/H August 2017 through June 2020. Her WBC and platelets have been normal. There are no other CBCs available to review for comparison of trend.     Interval History:  Ms. Hayes presents today for follow up of GLADYS. She continues to take Ferrous Sulfate 325 mg daily and is tolerating this well. She tried to increase iron supplement to BID but was not able to tolerate due to abdominal cramping and diarrhea. She had EGD per Dr. Mendez this morning. She denies any obvious bleeding from any source. She reports increased/improved fatigue. She denies any shortness of breath on exertion, chest pain or dizziness. She denies any specific complaints today.      The following portions of the patient's history were reviewed and updated as appropriate: allergies, current medications, past family history, past medical history, past social history, past surgical history and problem list.    PAST MEDICAL HISTORY:  Past Medical History:   Diagnosis Date   • Acid reflux    • Allergic    • Anxiety    • Arthritis    • Asthma    • Atrial fibrillation (HCC)    • Diabetes mellitus (HCC)    • Diastolic dysfunction     Grade I   • Diverticula of colon    • Endometriosis    • Goiter    • Gout    • Hyperlipidemia    • Hypertension    • Hyperthyroidism    • Morbid obesity (HCC)    • Panic attack    • RA (rheumatoid arthritis) (HCC)    • Sleep apnea     C-PAP SETTING OF 17, ONLY USES WHEN NOT SLEEPING IN RECLINER    • Spinal headache    • Stomach ulcer    • Vitamin D deficiency    • Wears glasses        PAST SURGICAL HISTORY:  Past Surgical History:   Procedure Laterality Date   •  SECTION      x2   • COLONOSCOPY     • FOOT SURGERY Left     ASHLEY NEUROMA, BONE SPUR AND ACHILLES REPAIR AND PLANTAR FASCITITS    • GASTRIC SLEEVE LAPAROSCOPIC      2 years ago   • HARDWARE REMOVAL FOOT / ANKLE Left    • HYSTERECTOMY      BSO    • JOINT REPLACEMENT     • KNEE SURGERY Left     2 (1 was partial knee replacement)   • NOSE SURGERY      DEVIATED SEPTUM AND SWOLLEN TURBINATES   • SKIN BIOPSY     • THYROIDECTOMY N/A 2017    Procedure: TOTAL THYROIDECTOMY;  Surgeon: Lencho Kay MD;  Location: [facility];  Service:    • TOOTH EXTRACTION     • WISDOM TOOTH EXTRACTION         SOCIAL HISTORY:  Social History     Socioeconomic History   • Marital status:    Tobacco Use   • Smoking status: Former Smoker     Packs/day: 0.25     Years: 6.00     Pack years: 1.50     Types: Cigarettes   • Smokeless tobacco: Never Used   Vaping Use   • Vaping Use: Never used   Substance and Sexual Activity   • Alcohol use: No     Comment: !x yearly   • Drug use: No   • Sexual activity: Defer      Comment: Hysterectomy       FAMILY HISTORY:  Family History   Problem Relation Age of Onset   • Heart attack Mother    • Arrhythmia Father    • Cancer Sister    • Arrhythmia Sister    • Cancer Brother    • Heart attack Maternal Grandmother    • Heart disease Maternal Grandmother    • Heart attack Maternal Grandfather          MEDICATIONS:  The current medication list was reviewed in the EMR    Current Outpatient Medications:   •  acetaminophen-codeine (TYLENOL #3) 300-30 MG per tablet, Take 1 tablet by mouth Every 6 (Six) Hours As Needed for Moderate Pain ., Disp: , Rfl:   •  albuterol (PROVENTIL HFA;VENTOLIN HFA) 108 (90 BASE) MCG/ACT inhaler, Inhale 2 puffs As Needed for Wheezing., Disp: , Rfl:   •  amitriptyline (ELAVIL) 100 MG tablet, Take 100 mg by mouth Every Evening., Disp: , Rfl:   •  Ascorbic Acid (VITAMIN C ER PO), Take  by mouth., Disp: , Rfl:   •  baclofen (LIORESAL) 10 MG tablet, Take 10 mg by mouth every night at bedtime. 10mg at night and 5 mg in the AM, Disp: , Rfl:   •  busPIRone (BUSPAR) 5 MG tablet, Take 5 mg by mouth 3 (Three) Times a Day., Disp: , Rfl:   •  dapagliflozin (Farxiga) 5 MG tablet tablet, Take 5 mg by mouth Daily., Disp: , Rfl:   •  Ergocalciferol (VITAMIN D2 PO), Take  by mouth., Disp: , Rfl:   •  ferrous sulfate 325 (65 FE) MG tablet, Take 1 tablet by mouth 3 (Three) Times a Day With Meals., Disp: 90 tablet, Rfl: 2  •  gabapentin (NEURONTIN) 300 MG capsule, Take 300 mg by mouth Every Night., Disp: , Rfl:   •  levothyroxine (SYNTHROID, LEVOTHROID) 150 MCG tablet, Take 150 mcg by mouth Daily., Disp: , Rfl:   •  losartan (COZAAR) 25 MG tablet, TAKE ONE TABLET BY MOUTH DAILY, Disp: 90 tablet, Rfl: 0  •  metFORMIN (GLUCOPHAGE) 500 MG tablet, Take 500 mg by mouth 2 (Two) Times a Day With Meals., Disp: , Rfl:   •  metoprolol tartrate (LOPRESSOR) 100 MG tablet, Take 1 tablet by mouth 2 (Two) Times a Day. Take with metoprolol tartrate 25 mg tablet for a total of 125 mg twice a day., Disp:  60 tablet, Rfl: 11  •  metoprolol tartrate (LOPRESSOR) 25 MG tablet, Take 1 tablet by mouth 2 (Two) Times a Day. Take with metoprolol tartrate 100 mg for a total of metoprolol 125 mg twice a day., Disp: 60 tablet, Rfl: 11  •  montelukast (SINGULAIR) 10 MG tablet, Take 10 mg by mouth Every Morning., Disp: , Rfl:   •  pantoprazole (PROTONIX) 40 MG EC tablet, Take 40 mg by mouth 2 (Two) Times a Day., Disp: , Rfl:   •  pravastatin (PRAVACHOL) 10 MG tablet, Take 10 mg by mouth Every Night., Disp: , Rfl:   •  rivaroxaban (Xarelto) 20 MG tablet, Take 1 tablet by mouth Daily With Dinner., Disp: 90 tablet, Rfl: 1  •  rOPINIRole (REQUIP) 0.5 MG tablet, Take 0.5 mg by mouth Every Night. Take 1 hour before bedtime., Disp: , Rfl:   •  sennosides-docusate (senna-docusate sodium) 8.6-50 MG per tablet, Take 2 tablets by mouth 2 (Two) Times a Day., Disp: 120 tablet, Rfl: 1  •  spironolactone (ALDACTONE) 25 MG tablet, TAKE 1 TABLET BY MOUTH DAILY, Disp: 90 tablet, Rfl: 0  •  allopurinol (ZYLOPRIM) 100 MG tablet, Take 100 mg by mouth Daily., Disp: , Rfl:     ALLERGIES:  No Known Allergies      REVIEW OF SYSTEMS:    A comprehensive 14 point review of systems was performed.  Significant findings as mentioned above.  All other systems reviewed and are negative.        Physical Exam   Vital Signs:   Vitals:    07/18/22 0938   BP: 130/83   Pulse: 77   Resp: 18   Temp: 97.1 °F (36.2 °C)   SpO2: 98%     ECOG score: 0   General: Well developed, well nourished, alert and oriented x 3, in no acute distress.   Head: ATNC   Eyes: PERRL, No evidence of conjunctivitis.   Nose: No nasal discharge.   Mouth: Oral mucosal membranes moist. No oral ulceration or hemorrhages.   Neck: Neck supple. No thyromegaly. No JVD.   Lungs: Clear in all fields to A&P without rales, rhonchi or wheezing.   Heart: S1, S2. Regular rate and rhythm. No murmurs, rubs, or gallops.   Abdomen: Soft. Bowel sounds are normoactive. Nontender with palpation.   Extremities: No  clubbing, cyanosis or edema bilaterally.   Integumentary: Warm, dry, intact.   Neurologic: Grossly non-focal exam.      Pain Score:  Pain Score    07/18/22 0938   PainSc:   5   PainLoc: Comment: joints.       PHQ-Score Total:  PHQ-9 Total Score:         PATHOLOGY:        ENDOSCOPY:        IMAGING:        RECENT LABS:  Lab Results   Component Value Date    WBC 7.47 07/18/2022    HGB 13.7 07/18/2022    HCT 43.3 07/18/2022    MCV 84.2 07/18/2022    RDW 14.0 07/18/2022     07/18/2022    NEUTRORELPCT 67.4 07/18/2022    LYMPHORELPCT 21.3 07/18/2022    MONORELPCT 7.8 07/18/2022    EOSRELPCT 2.4 07/18/2022    BASORELPCT 0.8 07/18/2022    NEUTROABS 5.04 07/18/2022    LYMPHSABS 1.59 07/18/2022       Lab Results   Component Value Date     07/18/2022    K 4.2 07/18/2022    CO2 24.8 07/18/2022     07/18/2022    BUN 14 07/18/2022    CREATININE 0.88 07/18/2022    EGFRIFNONA 75 12/11/2021    GLUCOSE 249 (H) 07/18/2022    CALCIUM 9.1 07/18/2022    ALKPHOS 70 07/18/2022    AST 30 07/18/2022    ALT 29 07/18/2022    BILITOT 0.3 07/18/2022    ALBUMIN 4.25 07/18/2022    PROTEINTOT 7.4 07/18/2022    MG 2.0 03/22/2022    PHOS 3.9 07/18/2017       Lab Results   Component Value Date     (H) 06/01/2022       Lab Results   Component Value Date    FERRITIN 76.53 07/18/2022    IRON 61 07/18/2022    TIBC 462 07/18/2022    LABIRON 13 (L) 07/18/2022    IQTZFDVG16 756 06/01/2022    FOLATE >20.00 06/01/2022    HAPTOGLOBIN 227 (H) 06/01/2022    RETICCTPCT 1.83 06/01/2022    RETIC 0.0977 06/01/2022        Work up 06/01/2022    Sed Rate   0 - 30 mm/hr 40 High      C-Reactive Protein   0.00 - 0.50 mg/dL 0.93 High      TSH   0.270 - 4.200 uIU/mL 0.711      Copper   80 - 158 ug/dL 119      Zinc   44 - 115 ug/dL 105      Tissue Transglutaminase IgA   0 - 3 U/mL <2      MILLER Direct   Negative Negative      Rheumatoid Factor Quantitative   0.0 - 14.0 IU/mL <10.0      Blood, UA   Negative Negative        ASSESSMENT & PLAN:  Ondina WALKER  Freddy is a very pleasant 56 y.o. female with    1. Microcytic Anemia  2. GLADYS  - Previous available CBCs were reviewed and noted microcytic anemia, Hg 9.4, in April 2022 and Hg 7.2 in March 2022. She had normal H/H August 2017 through June 2020. Her WBC and platelets have been normal. There are no other CBCs available to review for comparison of trend.   - She reports EGD/colonoscopy per Dr. Mendez last month which was unremarkable (no records currently available, will request today).  - She reports intermittent hematuria. Otherwise, denies any obvious blood loss.   - CBC from initial consultation showed normalized Hg (13.5). Iron and ferritin were marginally low with low iron saturation. Therefore, increased Ferrous Sulfate 325 mg BID. Unfortunately, she was not able to tolerate Ferrous Sulfate BID due to abdominal cramping and diarrhea and resumed taking it daily. She is tolerating Ferrous Sulfate 325 mg daily without any complaint.     - UA obtained today was negative for hematuria.   - To further evaluate anemia also sent additional labs including PBS (summarized above). B12 and Folate were replete. No evidence of hemolysis as Retic is normal with mildly elevated LDH and elevated Haptoglobin. ESR and CRP were elevated and suggestive of chronic underlying inflammation. TSH, Copper, Zinc and Tissue Transglutaminase within normal.   - She underwent EGD this morning per Dr. Mendez, will request records.   - Repeat CBC from today shows normal Hg (13.7). Iron and Ferritin have normalized. Will continue Ferrous Sulfate 325 mg daily.   - Will follow up in 3 months with repeat labs. She is aware to call in the interim if she develops any worsening fatigue, shortness of breath on exertion, chest pain or dizziness.   - Will continue to discuss with patient possible capsule study. She would like to await results of endoscopy from today.      3. Elevated PHQ-9  - She initially related elevated score to overall  weakness/fatigue and not being able to take care of her grandchildren. She reports today that with daily iron weakness/fatigue has improved and she is doing better in this regard. She is not interested in medication or referral to [facility]. She will continue to follow with PCP. She denies suicidal ideation or intentions.    .          A total of 30 minutes were spent coordinating this patient’s care in clinic today; more than 50% of this time was face-to-face with the patient, reviewing her interim medical history and counseling on the current treatment and followup plan. All questions were answered to her satisfaction.          Electronically Signed by: NHAN Gonzalez APRN July 18, 2022 11:05 EDT       CC:   No ref. provider found  Juliana Smith APRN

## 2022-07-21 ENCOUNTER — TELEPHONE (OUTPATIENT)
Dept: ONCOLOGY | Facility: CLINIC | Age: 57
End: 2022-07-21

## 2022-07-21 NOTE — TELEPHONE ENCOUNTER
Returned Erin call to jessewser questions she had about labs performed. She saw the comment on the CMP Slight hemolysis detected by analyzer. She was worried this had something to do with her being on blood thinners. I explained to her that sometimes this has to do with how fast or slow that the blood comes out during the draw. That it could be the tube or the flow. She understands.               ----- Message from Rhonda De La Vega MA sent at 7/21/2022  9:49 AM EDT -----  Patient called, and has questions about her labs from Monday. Please call her @ [phone]. Thanks

## 2022-08-10 ENCOUNTER — TREATMENT (OUTPATIENT)
Dept: CARDIOLOGY | Facility: CLINIC | Age: 57
End: 2022-08-10

## 2022-08-10 DIAGNOSIS — I48.0 PAROXYSMAL ATRIAL FIBRILLATION: ICD-10-CM

## 2022-08-10 DIAGNOSIS — R00.2 PALPITATIONS: ICD-10-CM

## 2022-08-10 PROCEDURE — 93228 REMOTE 30 DAY ECG REV/REPORT: CPT | Performed by: INTERNAL MEDICINE

## 2022-08-26 ENCOUNTER — OFFICE VISIT (OUTPATIENT)
Dept: CARDIOLOGY | Facility: CLINIC | Age: 57
End: 2022-08-26

## 2022-08-26 ENCOUNTER — TELEPHONE (OUTPATIENT)
Dept: CARDIOLOGY | Facility: CLINIC | Age: 57
End: 2022-08-26

## 2022-08-26 VITALS
OXYGEN SATURATION: 96 % | SYSTOLIC BLOOD PRESSURE: 120 MMHG | WEIGHT: 263 LBS | DIASTOLIC BLOOD PRESSURE: 82 MMHG | BODY MASS INDEX: 48.4 KG/M2 | HEART RATE: 76 BPM | HEIGHT: 62 IN

## 2022-08-26 DIAGNOSIS — I10 ESSENTIAL HYPERTENSION: ICD-10-CM

## 2022-08-26 DIAGNOSIS — R60.0 LOWER EXTREMITY EDEMA: ICD-10-CM

## 2022-08-26 DIAGNOSIS — I48.0 PAROXYSMAL ATRIAL FIBRILLATION: Primary | ICD-10-CM

## 2022-08-26 PROCEDURE — 99213 OFFICE O/P EST LOW 20 MIN: CPT | Performed by: NURSE PRACTITIONER

## 2022-08-26 RX ORDER — ONDANSETRON 4 MG/1
4 TABLET, FILM COATED ORAL EVERY 8 HOURS PRN
COMMUNITY

## 2022-08-26 RX ORDER — SPIRONOLACTONE 25 MG/1
25 TABLET ORAL DAILY
Qty: 90 TABLET | Refills: 1 | Status: SHIPPED | OUTPATIENT
Start: 2022-08-26 | End: 2022-08-29

## 2022-08-26 RX ORDER — FLUCONAZOLE 150 MG/1
150 TABLET ORAL ONCE
COMMUNITY

## 2022-08-26 RX ORDER — SEMAGLUTIDE 1.34 MG/ML
INJECTION, SOLUTION SUBCUTANEOUS WEEKLY
COMMUNITY

## 2022-08-26 NOTE — PROGRESS NOTES
Juliana Smith APRN  Ondina Hayes  1965 08/26/2022    Patient Active Problem List   Diagnosis   • Rapid atrial fibrillation (HCC)   • Paroxysmal atrial fibrillation (HCC)   • Hypertension   • Renal cyst       Dear Juliana Smith APRN:    Subjective     Chief Complaint   Patient presents with   • Med Management     List.    • Results     Event pt CA'd her echo apt.    • Chest Pain   • Syncope   • Shortness of Breath   • Palpitations   • Edema           History of Present Illness:    Ondina Hayes is a 56 y.o. female with a past medical history of paroxysmal atrial fibrillation and hypertension. She presents today for routine cardiology follow up. Previously she was anemic, but reports she has had additional EGD recently and previous ulcers had healed. Her hemoglobin has improved. She denies any recent bleeding. Previously she was having palpitations. A 30 day event monitor revealed predominately sinus rhythm with HR ranging from  bpm with no significant arrhythmias. She states she is feeling much better with metoprolol. She did not get echocardiogram due to cost. She denies chest pains or shortness of breath.           No Known Allergies:      Current Outpatient Medications:   •  acetaminophen-codeine (TYLENOL #3) 300-30 MG per tablet, Take 1 tablet by mouth Every 6 (Six) Hours As Needed for Moderate Pain ., Disp: , Rfl:   •  albuterol (PROVENTIL HFA;VENTOLIN HFA) 108 (90 BASE) MCG/ACT inhaler, Inhale 2 puffs As Needed for Wheezing., Disp: , Rfl:   •  allopurinol (ZYLOPRIM) 100 MG tablet, Take 100 mg by mouth Daily., Disp: , Rfl:   •  amitriptyline (ELAVIL) 100 MG tablet, Take 100 mg by mouth Every Evening., Disp: , Rfl:   •  Ascorbic Acid (VITAMIN C ER PO), Take  by mouth., Disp: , Rfl:   •  baclofen (LIORESAL) 10 MG tablet, Take 10 mg by mouth every night at bedtime. 10mg at night and 5 mg in the AM, Disp: , Rfl:   •  dapagliflozin (Farxiga) 5 MG tablet tablet, Take 5 mg by mouth Daily.,  Disp: , Rfl:   •  ferrous sulfate 325 (65 FE) MG tablet, Take 1 tablet by mouth 3 (Three) Times a Day With Meals., Disp: 90 tablet, Rfl: 2  •  fluconazole (DIFLUCAN) 150 MG tablet, Take 150 mg by mouth 1 (One) Time., Disp: , Rfl:   •  gabapentin (NEURONTIN) 300 MG capsule, Take 300 mg by mouth Every Night., Disp: , Rfl:   •  levothyroxine (SYNTHROID, LEVOTHROID) 150 MCG tablet, Take 150 mcg by mouth Daily., Disp: , Rfl:   •  losartan (COZAAR) 25 MG tablet, TAKE ONE TABLET BY MOUTH DAILY, Disp: 90 tablet, Rfl: 0  •  metFORMIN (GLUCOPHAGE) 500 MG tablet, Take 500 mg by mouth 2 (Two) Times a Day With Meals., Disp: , Rfl:   •  metoprolol tartrate (LOPRESSOR) 100 MG tablet, Take 1 tablet by mouth 2 (Two) Times a Day. Take with metoprolol tartrate 25 mg tablet for a total of 125 mg twice a day., Disp: 60 tablet, Rfl: 11  •  metoprolol tartrate (LOPRESSOR) 25 MG tablet, Take 1 tablet by mouth 2 (Two) Times a Day. Take with metoprolol tartrate 100 mg for a total of metoprolol 125 mg twice a day., Disp: 60 tablet, Rfl: 11  •  montelukast (SINGULAIR) 10 MG tablet, Take 10 mg by mouth Every Morning., Disp: , Rfl:   •  ondansetron (ZOFRAN) 4 MG tablet, Take 4 mg by mouth Every 8 (Eight) Hours As Needed for Nausea or Vomiting., Disp: , Rfl:   •  pantoprazole (PROTONIX) 40 MG EC tablet, Take 40 mg by mouth 2 (Two) Times a Day., Disp: , Rfl:   •  pravastatin (PRAVACHOL) 10 MG tablet, Take 10 mg by mouth Every Night., Disp: , Rfl:   •  rivaroxaban (Xarelto) 20 MG tablet, Take 1 tablet by mouth Daily With Dinner., Disp: 90 tablet, Rfl: 1  •  rOPINIRole (REQUIP) 0.5 MG tablet, Take 0.5 mg by mouth Every Night. Take 1 hour before bedtime., Disp: , Rfl:   •  Semaglutide,0.25 or 0.5MG/DOS, (Ozempic, 0.25 or 0.5 MG/DOSE,) 2 MG/1.5ML solution pen-injector, Inject  under the skin into the appropriate area as directed 1 (One) Time Per Week., Disp: , Rfl:   •  sennosides-docusate (senna-docusate sodium) 8.6-50 MG per tablet, Take 2 tablets  "by mouth 2 (Two) Times a Day., Disp: 120 tablet, Rfl: 1  •  spironolactone (ALDACTONE) 25 MG tablet, Take 1 tablet by mouth Daily., Disp: 90 tablet, Rfl: 1      The following portions of the patient's history were reviewed and updated as appropriate: allergies, current medications, past family history, past medical history, past social history, past surgical history and problem list.    Social History     Tobacco Use   • Smoking status: Former Smoker     Packs/day: 0.25     Years: 6.00     Pack years: 1.50     Types: Cigarettes   • Smokeless tobacco: Never Used   Vaping Use   • Vaping Use: Never used   Substance Use Topics   • Alcohol use: No     Comment: !x yearly   • Drug use: No       Review of Systems   Constitutional: Negative for decreased appetite and malaise/fatigue.   Cardiovascular: Negative for chest pain, dyspnea on exertion and palpitations.   Respiratory: Negative for cough and shortness of breath.        Objective   Vitals:    08/26/22 0902 08/26/22 0941   BP: 163/74 120/82   BP Location: Left arm Left arm   Patient Position: Sitting    Cuff Size: Adult    Pulse: 76    SpO2: 96%    Weight: 119 kg (263 lb)    Height: 157.5 cm (62\")      Body mass index is 48.1 kg/m².        Vitals reviewed.   Constitutional:       Appearance: Healthy appearance. Well-developed and not in distress.   HENT:      Head: Normocephalic and atraumatic.   Neck:      Vascular: No JVD.   Pulmonary:      Effort: Pulmonary effort is normal.      Breath sounds: Normal breath sounds. No wheezing. No rales.   Cardiovascular:      Normal rate. Regular rhythm.      Murmurs: There is no murmur.      . No S3 and S4 gallop.   Edema:     Peripheral edema absent.   Abdominal:      General: Bowel sounds are normal.      Palpations: Abdomen is soft.   Musculoskeletal:      Comments: Ambulating with cane Skin:     General: Skin is warm and dry.   Neurological:      Mental Status: Alert, oriented to person, place, and time and oriented to " person, place and time.   Psychiatric:         Mood and Affect: Mood normal.         Behavior: Behavior normal.         Lab Results   Component Value Date     07/18/2022    K 4.2 07/18/2022     07/18/2022    CO2 24.8 07/18/2022    BUN 14 07/18/2022    CREATININE 0.88 07/18/2022    GLUCOSE 249 (H) 07/18/2022    CALCIUM 9.1 07/18/2022    AST 30 07/18/2022    ALT 29 07/18/2022    ALKPHOS 70 07/18/2022    LABIL2 1.2 (L) 03/10/2015     Lab Results   Component Value Date    CKTOTAL 27 07/18/2017     Lab Results   Component Value Date    WBC 7.47 07/18/2022    HGB 13.7 07/18/2022    HCT 43.3 07/18/2022     07/18/2022     Lab Results   Component Value Date    INR 1.00 03/22/2022    INR 0.89 (L) 06/03/2020    INR 1.13 (H) 08/07/2017     Lab Results   Component Value Date    MG 2.0 03/22/2022     Lab Results   Component Value Date    TSH 0.711 06/01/2022    CHLPL 164 03/10/2015    TRIG 82 03/10/2015    HDL 49 (L) 03/10/2015    LDL 99 03/10/2015      No results found for: BNP        Procedures      Assessment & Plan    Diagnosis Plan   1. Paroxysmal atrial fibrillation (HCC)     2. Essential hypertension  spironolactone (ALDACTONE) 25 MG tablet   3. Lower extremity edema  spironolactone (ALDACTONE) 25 MG tablet                Recommendations:    1. Paroxysmal atrial fibrillation - maintaining sinus rhythm. We reviewed event monitor findings today. Continue metoprolol and Xarelto. Recent hemoglobin normal.  2. Essential hypertension - well controlled.  3. Lower extremity edema - resolved with spironolactone. Will request most recent labs from PCP.  4. Follow up in 5 months or sooner if needed.         Return in about 5 months (around 1/26/2023) for Recheck.    As always, I appreciate very much the opportunity to participate in the cardiovascular care of your patients.      With Best Regards,    NHAN Anderson

## 2022-08-26 NOTE — TELEPHONE ENCOUNTER
----- Message from NHAN Anderson sent at 8/26/2022  9:42 AM EDT -----  Please request labs from PCP. Thanks.    REQUESTED

## 2022-08-28 DIAGNOSIS — I10 ESSENTIAL HYPERTENSION: ICD-10-CM

## 2022-08-28 DIAGNOSIS — R60.0 LOWER EXTREMITY EDEMA: ICD-10-CM

## 2022-08-29 ENCOUNTER — HOSPITAL ENCOUNTER (OUTPATIENT)
Dept: MAMMOGRAPHY | Facility: HOSPITAL | Age: 57
Discharge: HOME | End: 2022-08-29
Admitting: NURSE PRACTITIONER

## 2022-08-29 DIAGNOSIS — Z12.31 VISIT FOR SCREENING MAMMOGRAM: ICD-10-CM

## 2022-08-29 PROCEDURE — 77067 SCR MAMMO BI INCL CAD: CPT | Performed by: RADIOLOGY

## 2022-08-29 PROCEDURE — 77063 BREAST TOMOSYNTHESIS BI: CPT

## 2022-08-29 PROCEDURE — 77067 SCR MAMMO BI INCL CAD: CPT

## 2022-08-29 PROCEDURE — 77063 BREAST TOMOSYNTHESIS BI: CPT | Performed by: RADIOLOGY

## 2022-08-29 RX ORDER — SPIRONOLACTONE 25 MG/1
25 TABLET ORAL DAILY
Qty: 90 TABLET | Refills: 1 | Status: SHIPPED | OUTPATIENT
Start: 2022-08-29

## 2023-02-22 ENCOUNTER — TRANSCRIBE ORDERS (OUTPATIENT)
Dept: ADMINISTRATIVE | Facility: HOSPITAL | Age: 58
End: 2023-02-22
Payer: COMMERCIAL

## 2023-02-22 DIAGNOSIS — H46.8 OTHER OPTIC NEURITIS: Primary | ICD-10-CM

## 2023-02-22 DIAGNOSIS — H53.19 OTHER SUBJECTIVE VISUAL DISTURBANCES: ICD-10-CM

## 2023-05-03 ENCOUNTER — TRANSCRIBE ORDERS (OUTPATIENT)
Dept: ADMINISTRATIVE | Facility: HOSPITAL | Age: 58
End: 2023-05-03
Payer: COMMERCIAL

## 2023-05-03 DIAGNOSIS — M81.0 AGE RELATED OSTEOPOROSIS, UNSPECIFIED PATHOLOGICAL FRACTURE PRESENCE: Primary | ICD-10-CM

## 2023-05-04 ENCOUNTER — TRANSCRIBE ORDERS (OUTPATIENT)
Dept: ADMINISTRATIVE | Facility: HOSPITAL | Age: 58
End: 2023-05-04
Payer: COMMERCIAL

## 2023-05-04 DIAGNOSIS — Z87.891 PERSONAL HISTORY OF TOBACCO USE, PRESENTING HAZARDS TO HEALTH: Primary | ICD-10-CM

## 2023-10-24 DIAGNOSIS — R60.0 LOWER EXTREMITY EDEMA: ICD-10-CM

## 2023-10-24 DIAGNOSIS — I10 ESSENTIAL HYPERTENSION: ICD-10-CM

## 2023-10-24 RX ORDER — SPIRONOLACTONE 25 MG/1
25 TABLET ORAL DAILY
Qty: 90 TABLET | OUTPATIENT
Start: 2023-10-24

## 2023-10-24 RX ORDER — SPIRONOLACTONE 25 MG/1
25 TABLET ORAL DAILY
Qty: 30 TABLET | Refills: 0 | Status: SHIPPED | OUTPATIENT
Start: 2023-10-24

## 2023-11-19 DIAGNOSIS — R60.0 LOWER EXTREMITY EDEMA: ICD-10-CM

## 2023-11-19 DIAGNOSIS — I10 ESSENTIAL HYPERTENSION: ICD-10-CM

## 2023-11-20 RX ORDER — SPIRONOLACTONE 25 MG/1
25 TABLET ORAL DAILY
Qty: 90 TABLET | OUTPATIENT
Start: 2023-11-20

## 2024-03-06 ENCOUNTER — TELEPHONE (OUTPATIENT)
Dept: CARDIOLOGY | Facility: CLINIC | Age: 59
End: 2024-03-06
Payer: COMMERCIAL

## 2024-03-06 NOTE — TELEPHONE ENCOUNTER
Caller: Ahsan Lamar    Relationship: Self    Best call back number: [phone]     What is the best time to reach you: ANYTIME    Do you know the name of the person who called: AHSAN LAMAR    What was the call regarding: PATIENT'S XARELTO DISCOUNT CARD HAS . THE PATIENT WOULD LIKE TO KNOW IF SHE CAN HAVE A NEW DISCOUNT CARD. PATIENT IS COMPLETELY OUT OF XARELTO.

## 2024-03-06 NOTE — TELEPHONE ENCOUNTER
Hub to relay.     Pt will need to be seen in office. She was last seen 8/26/22. Called pt and advised her. Made her an apt for 3/7 at 2:15 pm. I advised her that she can contact her PCP and see if they have samples for her. She expressed understanding.

## 2024-03-07 ENCOUNTER — TELEPHONE (OUTPATIENT)
Dept: CARDIOLOGY | Facility: CLINIC | Age: 59
End: 2024-03-07

## 2024-03-07 ENCOUNTER — OFFICE VISIT (OUTPATIENT)
Dept: CARDIOLOGY | Facility: CLINIC | Age: 59
End: 2024-03-07
Payer: COMMERCIAL

## 2024-03-07 VITALS
DIASTOLIC BLOOD PRESSURE: 74 MMHG | HEIGHT: 62 IN | SYSTOLIC BLOOD PRESSURE: 126 MMHG | HEART RATE: 76 BPM | WEIGHT: 259 LBS | OXYGEN SATURATION: 98 % | BODY MASS INDEX: 47.66 KG/M2

## 2024-03-07 DIAGNOSIS — I48.0 PAROXYSMAL ATRIAL FIBRILLATION: ICD-10-CM

## 2024-03-07 DIAGNOSIS — I10 ESSENTIAL HYPERTENSION: Primary | ICD-10-CM

## 2024-03-07 DIAGNOSIS — Z01.810 PREOPERATIVE CARDIOVASCULAR EXAMINATION: ICD-10-CM

## 2024-03-07 DIAGNOSIS — R06.09 DYSPNEA ON EXERTION: ICD-10-CM

## 2024-03-07 RX ORDER — PROGESTERONE 100 MG/1
100 CAPSULE ORAL DAILY
COMMUNITY

## 2024-03-07 RX ORDER — MAGNESIUM GLUCONATE 27 MG(500)
27 TABLET ORAL 2 TIMES DAILY
COMMUNITY

## 2024-03-07 RX ORDER — HYDROXYCHLOROQUINE SULFATE 200 MG/1
200 TABLET, FILM COATED ORAL DAILY
COMMUNITY

## 2024-03-07 NOTE — PROGRESS NOTES
Juliana Smith APRN  Ondina Hayes  1965 03/07/2024    Patient Active Problem List   Diagnosis    Rapid atrial fibrillation    Paroxysmal atrial fibrillation    Hypertension    Renal cyst       Dear Juliana Smith APRN:    Subjective     Chief Complaint   Patient presents with    Surgical Clearance     Screw Removal in right foot           History of Present Illness:    Ondina Hayes is a 58 y.o. female with a past medical history of paroxysmal atrial fibrillation and hypertension. She presents today for routine cardiology follow up. She was last in our office in August of 2022. She is seeking preoperative cardiac risk assessment prior to undergoing hardware removal in her right foot.. She denies any chest pains or palpitations. Denies bleeding issues with Xarelto. She does report dyspnea with mild to moderate exertion. Denies weight gain, orthopnea, or PND.          No Known Allergies:      Current Outpatient Medications:     acetaminophen-codeine (TYLENOL #3) 300-30 MG per tablet, Take 1 tablet by mouth Every 6 (Six) Hours As Needed for Moderate Pain., Disp: , Rfl:     albuterol (PROVENTIL HFA;VENTOLIN HFA) 108 (90 BASE) MCG/ACT inhaler, Inhale 2 puffs As Needed for Wheezing., Disp: , Rfl:     amitriptyline (ELAVIL) 100 MG tablet, Take 1 tablet by mouth Every Evening., Disp: , Rfl:     baclofen (LIORESAL) 10 MG tablet, Take 1 tablet by mouth every night at bedtime. 10mg at night and 5 mg in the AM, Disp: , Rfl:     estradiol (CLIMARA) 0.025 MG/24HR patch, Place 1 patch on the skin as directed by provider 1 (One) Time Per Week., Disp: , Rfl:     gabapentin (NEURONTIN) 300 MG capsule, Take 1 capsule by mouth Every Night., Disp: , Rfl:     hydroxychloroquine (PLAQUENIL) 200 MG tablet, Take 1 tablet by mouth Daily., Disp: , Rfl:     levothyroxine (SYNTHROID, LEVOTHROID) 150 MCG tablet, Take 1 tablet by mouth Daily., Disp: , Rfl:     magnesium gluconate (MAGONATE) 500 MG tablet, Take 1 tablet by  mouth 2 (Two) Times a Day., Disp: , Rfl:     metoprolol tartrate (LOPRESSOR) 100 MG tablet, Take 1 tablet by mouth 2 (Two) Times a Day. Take with metoprolol tartrate 25 mg tablet for a total of 125 mg twice a day., Disp: 60 tablet, Rfl: 11    metoprolol tartrate (LOPRESSOR) 25 MG tablet, Take 1 tablet by mouth 2 (Two) Times a Day. Take with metoprolol tartrate 100 mg for a total of metoprolol 125 mg twice a day., Disp: 60 tablet, Rfl: 11    montelukast (SINGULAIR) 10 MG tablet, Take 1 tablet by mouth Every Morning., Disp: , Rfl:     ondansetron (ZOFRAN) 4 MG tablet, Take 1 tablet by mouth Every 8 (Eight) Hours As Needed for Nausea or Vomiting., Disp: , Rfl:     pantoprazole (PROTONIX) 40 MG EC tablet, Take 1 tablet by mouth 2 (Two) Times a Day., Disp: , Rfl:     pravastatin (PRAVACHOL) 10 MG tablet, Take 1 tablet by mouth Every Night., Disp: , Rfl:     Progesterone (PROMETRIUM) 100 MG capsule, Take 1 capsule by mouth Daily., Disp: , Rfl:     rivaroxaban (Xarelto) 20 MG tablet, Take 1 tablet by mouth Daily With Dinner., Disp: 90 tablet, Rfl: 1    rOPINIRole (REQUIP) 0.5 MG tablet, Take 2 tablets by mouth Every Night. Take 1 hour before bedtime., Disp: , Rfl:     Semaglutide,0.25 or 0.5MG/DOS, (Ozempic, 0.25 or 0.5 MG/DOSE,) 2 MG/1.5ML solution pen-injector, Inject  under the skin into the appropriate area as directed 1 (One) Time Per Week., Disp: , Rfl:     sennosides-docusate (senna-docusate sodium) 8.6-50 MG per tablet, Take 2 tablets by mouth 2 (Two) Times a Day., Disp: 120 tablet, Rfl: 1    spironolactone (ALDACTONE) 25 MG tablet, TAKE 1 TABLET BY MOUTH DAILY, Disp: 30 tablet, Rfl: 0    allopurinol (ZYLOPRIM) 100 MG tablet, Take 100 mg by mouth Daily. (Patient not taking: Reported on 3/7/2024), Disp: , Rfl:     Ascorbic Acid (VITAMIN C ER PO), Take  by mouth. (Patient not taking: Reported on 3/7/2024), Disp: , Rfl:     ferrous sulfate 325 (65 FE) MG tablet, Take 1 tablet by mouth 3 (Three) Times a Day With Meals.  "(Patient not taking: Reported on 3/7/2024), Disp: 90 tablet, Rfl: 2    fluconazole (DIFLUCAN) 150 MG tablet, Take 150 mg by mouth 1 (One) Time. (Patient not taking: Reported on 3/7/2024), Disp: , Rfl:       The following portions of the patient's history were reviewed and updated as appropriate: allergies, current medications, past family history, past medical history, past social history, past surgical history and problem list.    Social History     Tobacco Use    Smoking status: Every Day     Current packs/day: 0.25     Average packs/day: 0.3 packs/day for 6.0 years (1.5 ttl pk-yrs)     Types: Cigarettes     Passive exposure: Current    Smokeless tobacco: Never   Vaping Use    Vaping status: Never Used   Substance Use Topics    Alcohol use: No     Comment: !x yearly    Drug use: No       Review of Systems   Constitutional: Negative for decreased appetite and malaise/fatigue.   Cardiovascular:  Negative for chest pain, dyspnea on exertion and palpitations.   Respiratory:  Negative for cough and shortness of breath.        Objective   Vitals:    03/07/24 1443   BP: 126/74   BP Location: Left arm   Patient Position: Sitting   Cuff Size: Large Adult   Pulse: 76   SpO2: 98%   Weight: 117 kg (259 lb)   Height: 157.5 cm (62\")     Body mass index is 47.37 kg/m².        Vitals reviewed.   Constitutional:       Appearance: Healthy appearance. Well-developed and not in distress.   HENT:      Head: Normocephalic and atraumatic.   Neck:      Vascular: No carotid bruit or JVD.   Pulmonary:      Effort: Pulmonary effort is normal.      Breath sounds: Normal breath sounds. No wheezing. No rales.   Cardiovascular:      Normal rate. Regular rhythm.      Murmurs: There is no murmur.      . No S3 and S4 gallop.   Edema:     Peripheral edema absent.   Abdominal:      General: Bowel sounds are normal.      Palpations: Abdomen is soft.   Skin:     General: Skin is warm and dry.   Neurological:      Mental Status: Alert, oriented to " "person, place, and time and oriented to person, place and time.   Psychiatric:         Mood and Affect: Mood normal.         Behavior: Behavior normal.         Lab Results   Component Value Date     07/18/2022    K 4.2 07/18/2022     07/18/2022    CO2 24.8 07/18/2022    BUN 14 07/18/2022    CREATININE 0.88 07/18/2022    GLUCOSE 249 (H) 07/18/2022    CALCIUM 9.1 07/18/2022    AST 30 07/18/2022    ALT 29 07/18/2022    ALKPHOS 70 07/18/2022    LABIL2 1.2 (L) 03/10/2015     Lab Results   Component Value Date    CKTOTAL 27 07/18/2017     Lab Results   Component Value Date    WBC 7.47 07/18/2022    HGB 13.7 07/18/2022    HCT 43.3 07/18/2022     07/18/2022     Lab Results   Component Value Date    INR 1.00 03/22/2022    INR 0.89 (L) 06/03/2020    INR 1.13 (H) 08/07/2017     Lab Results   Component Value Date    MG 2.0 03/22/2022     Lab Results   Component Value Date    TSH 0.711 06/01/2022    CHLPL 164 03/10/2015    TRIG 82 03/10/2015    HDL 49 (L) 03/10/2015    LDL 99 03/10/2015      No results found for: \"BNP\"          ECG 12 Lead    Date/Time: 3/7/2024 2:43 PM  Performed by: Lissette Denis APRN    Authorized by: Lissette Denis APRN  Comparison: compared with previous ECG   Similar to previous ECG  Rhythm: sinus rhythm  BPM: 78            Assessment & Plan    Diagnosis Plan   1. Essential hypertension        2. Paroxysmal atrial fibrillation  Adult Transthoracic Echo Complete W/ Cont if Necessary Per Protocol      3. Preoperative cardiovascular examination  Adult Transthoracic Echo Complete W/ Cont if Necessary Per Protocol      4. Dyspnea on exertion  Adult Transthoracic Echo Complete W/ Cont if Necessary Per Protocol                   Recommendations:    Essential hypertension - BP well controlled.  Paroxysmal atrial fibrillation - continue Xarelto and metoprolol.  Preoperative cardiac risk assessment - will obtain echo prior to risk assessment given her exertional dyspnea.  Dyspnea on " exertion - echo ordered.  Follow up in 4 weeks or sooner if needed.        Return in about 4 weeks (around 4/4/2024) for Recheck.    As always, I appreciate very much the opportunity to participate in the cardiovascular care of your patients.      With Best Regards,    NHAN Anderson

## 2024-03-08 NOTE — TELEPHONE ENCOUNTER
Pt was last seen on 08/26/2022. She will need to be seen before we can clear her. She has an apt on 04/01/2024.

## 2024-03-21 ENCOUNTER — TELEPHONE (OUTPATIENT)
Dept: CARDIOLOGY | Facility: CLINIC | Age: 59
End: 2024-03-21
Payer: COMMERCIAL

## 2024-03-21 NOTE — TELEPHONE ENCOUNTER
Caller: TONEY    Relationship: Provider    Best call back number: [phone]     What form or medical record are you requesting: RECENT EKG    Who is requesting this form or medical record from you: [facility] [place]    How would you like to receive the form or medical records (pick-up, mail, fax): FAX  If fax, what is the fax number: [phone]    Timeframe paperwork needed: ASAP

## 2024-03-25 ENCOUNTER — TELEPHONE (OUTPATIENT)
Dept: CARDIOLOGY | Facility: CLINIC | Age: 59
End: 2024-03-25
Payer: COMMERCIAL

## 2024-03-25 NOTE — TELEPHONE ENCOUNTER
Patient is having surgery on her foot to remove a screw. Dr. Knowles in [facility]. Patient needs to know how long she can stop her Xarelto. Patient is scheduled for the surgery on April 3rd and has an echo scheduled for 03/29/2024.    Thanks!

## 2024-03-25 NOTE — TELEPHONE ENCOUNTER
Spoke with patient and advised that we will not advise to hold blood thinners until after her tests are completes and can be reviewed by provider .  At that time they will advise on holding any medications and send clearance to provider.

## 2024-03-26 ENCOUNTER — TELEPHONE (OUTPATIENT)
Dept: CARDIOLOGY | Facility: CLINIC | Age: 59
End: 2024-03-26
Payer: COMMERCIAL

## 2024-03-26 NOTE — TELEPHONE ENCOUNTER
Please call and see if we can get her echo done today or tomorrow. I will also discuss with Dr. Aldana.

## 2024-03-26 NOTE — TELEPHONE ENCOUNTER
Called Scheduling they were able to move her echo up to tomorrow at 8am. Spoke with pt and advised her of new time and date, pt verbalized understanding.

## 2024-03-26 NOTE — TELEPHONE ENCOUNTER
Pt called in wanting to know the status of her cardiac clearance, I advised her that we will have to wait until her Echo is done. Her surgery is scheduled for 4/3, but her appointment is on 4/2, so this would not give her enough time to hold any medications beforehand.

## 2024-03-27 ENCOUNTER — HOSPITAL ENCOUNTER (OUTPATIENT)
Dept: CARDIOLOGY | Facility: HOSPITAL | Age: 59
Discharge: HOME | End: 2024-03-27
Admitting: NURSE PRACTITIONER
Payer: COMMERCIAL

## 2024-03-27 DIAGNOSIS — Z01.810 PREOPERATIVE CARDIOVASCULAR EXAMINATION: ICD-10-CM

## 2024-03-27 DIAGNOSIS — R06.09 DYSPNEA ON EXERTION: ICD-10-CM

## 2024-03-27 DIAGNOSIS — I48.0 PAROXYSMAL ATRIAL FIBRILLATION: ICD-10-CM

## 2024-03-27 PROCEDURE — 93306 TTE W/DOPPLER COMPLETE: CPT

## 2024-03-31 LAB
BH CV ECHO MEAS - AO MAX PG: 8.9 MMHG
BH CV ECHO MEAS - AO MEAN PG: 6 MMHG
BH CV ECHO MEAS - AO ROOT DIAM: 2.4 CM
BH CV ECHO MEAS - AO V2 MAX: 149 CM/SEC
BH CV ECHO MEAS - AO V2 VTI: 35.2 CM
BH CV ECHO MEAS - EDV(CUBED): 110.6 ML
BH CV ECHO MEAS - EDV(MOD-SP4): 50.6 ML
BH CV ECHO MEAS - EF(MOD-SP4): 70.6 %
BH CV ECHO MEAS - ESV(CUBED): 59.3 ML
BH CV ECHO MEAS - ESV(MOD-SP4): 14.9 ML
BH CV ECHO MEAS - FS: 18.8 %
BH CV ECHO MEAS - IVS/LVPW: 0.8 CM
BH CV ECHO MEAS - IVSD: 1.2 CM
BH CV ECHO MEAS - LA DIMENSION: 3.2 CM
BH CV ECHO MEAS - LAT PEAK E' VEL: 10 CM/SEC
BH CV ECHO MEAS - LV DIASTOLIC VOL/BSA (35-75): 23.7 CM2
BH CV ECHO MEAS - LV MASS(C)D: 259.6 GRAMS
BH CV ECHO MEAS - LV SYSTOLIC VOL/BSA (12-30): 7 CM2
BH CV ECHO MEAS - LVIDD: 4.8 CM
BH CV ECHO MEAS - LVIDS: 3.9 CM
BH CV ECHO MEAS - LVOT AREA: 4.5 CM2
BH CV ECHO MEAS - LVOT DIAM: 2.4 CM
BH CV ECHO MEAS - LVPWD: 1.5 CM
BH CV ECHO MEAS - MED PEAK E' VEL: 9.7 CM/SEC
BH CV ECHO MEAS - MV A MAX VEL: 111 CM/SEC
BH CV ECHO MEAS - MV E MAX VEL: 91.2 CM/SEC
BH CV ECHO MEAS - MV E/A: 0.82
BH CV ECHO MEAS - PA ACC TIME: 0.1 SEC
BH CV ECHO MEAS - SI(MOD-SP4): 16.7 ML/M2
BH CV ECHO MEAS - SV(MOD-SP4): 35.7 ML
BH CV ECHO MEAS - TAPSE (>1.6): 2.5 CM
BH CV ECHO MEASUREMENTS AVERAGE E/E' RATIO: 9.26
LEFT ATRIUM VOLUME INDEX: 25.2 ML/M2

## 2025-07-09 ENCOUNTER — HOSPITAL ENCOUNTER (OUTPATIENT)
Dept: GENERAL RADIOLOGY | Facility: HOSPITAL | Age: 60
Discharge: HOME | End: 2025-07-09
Admitting: NURSE PRACTITIONER
Payer: COMMERCIAL

## 2025-07-09 ENCOUNTER — TRANSCRIBE ORDERS (OUTPATIENT)
Dept: ADMINISTRATIVE | Facility: HOSPITAL | Age: 60
End: 2025-07-09
Payer: COMMERCIAL

## 2025-07-09 DIAGNOSIS — M47.816 SPONDYLOSIS WITHOUT MYELOPATHY OR RADICULOPATHY, LUMBAR REGION: ICD-10-CM

## 2025-07-09 DIAGNOSIS — M47.816 SPONDYLOSIS WITHOUT MYELOPATHY OR RADICULOPATHY, LUMBAR REGION: Primary | ICD-10-CM

## 2025-07-09 PROCEDURE — 72114 X-RAY EXAM L-S SPINE BENDING: CPT

## (undated) DEVICE — DRSNG TELFA PAD NONADH STR 1S 3X8IN

## (undated) DEVICE — PROBE 8225101 5PK STD PRASS FL TIP ROHS

## (undated) DEVICE — GLV SURG SIGNATURE TOUCH PF LTX 7 STRL

## (undated) DEVICE — SUT SILK 3-0 TIES 18IN SA64H

## (undated) DEVICE — ANTIBACTERIAL UNDYED BRAIDED (POLYGLACTIN 910), SYNTHETIC ABSORBABLE SUTURE: Brand: COATED VICRYL

## (undated) DEVICE — SOL LR 1000ML

## (undated) DEVICE — AIRWY 90MM NO9

## (undated) DEVICE — PK MAJ ENT 10

## (undated) DEVICE — EMG TUBE 8229707 NIM TRIVANTAGE 7.0MM ID: Brand: NIM TRIVANTAGE™

## (undated) DEVICE — SYR LL TP 10ML STRL

## (undated) DEVICE — MEDI-VAC YANKAUER SUCTION HANDLE W/BULBOUS TIP: Brand: CARDINAL HEALTH

## (undated) DEVICE — MEDI-VAC NON-CONDUCTIVE SUCTION TUBING: Brand: CARDINAL HEALTH

## (undated) DEVICE — SUT SILK 3/0 SH 30IN K832H

## (undated) DEVICE — KITTNER SPONGE: Brand: DEROYAL

## (undated) DEVICE — SUT SILK 2/0 TIES 18IN A185H

## (undated) DEVICE — COVER,LIGHT HANDLE,FLX,1/PK: Brand: MEDLINE INDUSTRIES, INC.

## (undated) DEVICE — SUT ETHLN 5/0 PC3 PLSTC 18IN 1865G

## (undated) DEVICE — NDL HYPO ECLPS SFTY 25G 1 1/2IN

## (undated) DEVICE — CANNULA,OXY,ADULT,SUPERSOFT,W/7'TUB,UC: Brand: MEDLINE